# Patient Record
Sex: MALE | Race: WHITE | NOT HISPANIC OR LATINO | Employment: OTHER | URBAN - METROPOLITAN AREA
[De-identification: names, ages, dates, MRNs, and addresses within clinical notes are randomized per-mention and may not be internally consistent; named-entity substitution may affect disease eponyms.]

---

## 2019-12-31 ENCOUNTER — RECORDS - HEALTHEAST (OUTPATIENT)
Dept: LAB | Facility: CLINIC | Age: 74
End: 2019-12-31

## 2020-01-02 LAB
ANION GAP SERPL CALCULATED.3IONS-SCNC: 10 MMOL/L (ref 5–18)
BUN SERPL-MCNC: 27 MG/DL (ref 8–28)
CALCIUM SERPL-MCNC: 9.2 MG/DL (ref 8.5–10.5)
CHLORIDE BLD-SCNC: 103 MMOL/L (ref 98–107)
CO2 SERPL-SCNC: 25 MMOL/L (ref 22–31)
CREAT SERPL-MCNC: 1.5 MG/DL (ref 0.7–1.3)
GFR SERPL CREATININE-BSD FRML MDRD: 46 ML/MIN/1.73M2
GLUCOSE BLD-MCNC: 133 MG/DL (ref 70–125)
POTASSIUM BLD-SCNC: 4.6 MMOL/L (ref 3.5–5)
SODIUM SERPL-SCNC: 138 MMOL/L (ref 136–145)

## 2020-01-03 ENCOUNTER — RECORDS - HEALTHEAST (OUTPATIENT)
Dept: LAB | Facility: CLINIC | Age: 75
End: 2020-01-03

## 2020-01-05 LAB
GAMMA INTERFERON BACKGROUND BLD IA-ACNC: 0.02 IU/ML
M TB IFN-G BLD-IMP: NEGATIVE
MITOGEN IGNF BCKGRD COR BLD-ACNC: 0 IU/ML
MITOGEN IGNF BCKGRD COR BLD-ACNC: 0.01 IU/ML
QTF INTERPRETATION: NORMAL
QTF MITOGEN - NIL: 9.38 IU/ML

## 2020-01-06 LAB
ANION GAP SERPL CALCULATED.3IONS-SCNC: 10 MMOL/L (ref 5–18)
BUN SERPL-MCNC: 15 MG/DL (ref 8–28)
CALCIUM SERPL-MCNC: 9.2 MG/DL (ref 8.5–10.5)
CHLORIDE BLD-SCNC: 109 MMOL/L (ref 98–107)
CO2 SERPL-SCNC: 21 MMOL/L (ref 22–31)
CREAT SERPL-MCNC: 1.1 MG/DL (ref 0.7–1.3)
ERYTHROCYTE [DISTWIDTH] IN BLOOD BY AUTOMATED COUNT: 21.1 % (ref 11–14.5)
FERRITIN SERPL-MCNC: 23 NG/ML (ref 27–300)
FOLATE SERPL-MCNC: 5.7 NG/ML
GFR SERPL CREATININE-BSD FRML MDRD: >60 ML/MIN/1.73M2
GLUCOSE BLD-MCNC: 116 MG/DL (ref 70–125)
HCT VFR BLD AUTO: 38.9 % (ref 40–54)
HGB BLD-MCNC: 11.9 G/DL (ref 14–18)
IRON SATN MFR SERPL: 10 % (ref 20–50)
IRON SERPL-MCNC: 35 UG/DL (ref 42–175)
MCH RBC QN AUTO: 24.1 PG (ref 27–34)
MCHC RBC AUTO-ENTMCNC: 30.6 G/DL (ref 32–36)
MCV RBC AUTO: 79 FL (ref 80–100)
PLATELET # BLD AUTO: 383 THOU/UL (ref 140–440)
PMV BLD AUTO: 11.1 FL (ref 8.5–12.5)
POTASSIUM BLD-SCNC: 4.6 MMOL/L (ref 3.5–5)
RBC # BLD AUTO: 4.94 MILL/UL (ref 4.4–6.2)
SODIUM SERPL-SCNC: 140 MMOL/L (ref 136–145)
TIBC SERPL-MCNC: 340 UG/DL (ref 313–563)
TRANSFERRIN SERPL-MCNC: 272 MG/DL (ref 212–360)
VIT B12 SERPL-MCNC: 489 PG/ML (ref 213–816)
WBC: 16.5 THOU/UL (ref 4–11)

## 2020-01-13 ENCOUNTER — RECORDS - HEALTHEAST (OUTPATIENT)
Dept: LAB | Facility: CLINIC | Age: 75
End: 2020-01-13

## 2020-01-14 LAB
ANION GAP SERPL CALCULATED.3IONS-SCNC: 10 MMOL/L (ref 5–18)
BUN SERPL-MCNC: 28 MG/DL (ref 8–28)
CALCIUM SERPL-MCNC: 9.5 MG/DL (ref 8.5–10.5)
CHLORIDE BLD-SCNC: 106 MMOL/L (ref 98–107)
CO2 SERPL-SCNC: 22 MMOL/L (ref 22–31)
CREAT SERPL-MCNC: 1.25 MG/DL (ref 0.7–1.3)
GFR SERPL CREATININE-BSD FRML MDRD: 56 ML/MIN/1.73M2
GLUCOSE BLD-MCNC: 98 MG/DL (ref 70–125)
POTASSIUM BLD-SCNC: 4.3 MMOL/L (ref 3.5–5)
SODIUM SERPL-SCNC: 138 MMOL/L (ref 136–145)

## 2020-07-27 ENCOUNTER — RECORDS - HEALTHEAST (OUTPATIENT)
Dept: LAB | Facility: CLINIC | Age: 75
End: 2020-07-27

## 2020-07-28 LAB
ANION GAP SERPL CALCULATED.3IONS-SCNC: 12 MMOL/L (ref 5–18)
BUN SERPL-MCNC: 37 MG/DL (ref 8–28)
CALCIUM SERPL-MCNC: 9.2 MG/DL (ref 8.5–10.5)
CHLORIDE BLD-SCNC: 97 MMOL/L (ref 98–107)
CO2 SERPL-SCNC: 26 MMOL/L (ref 22–31)
CREAT SERPL-MCNC: 1.43 MG/DL (ref 0.7–1.3)
ERYTHROCYTE [DISTWIDTH] IN BLOOD BY AUTOMATED COUNT: 30 % (ref 11–14.5)
GFR SERPL CREATININE-BSD FRML MDRD: 48 ML/MIN/1.73M2
GLUCOSE BLD-MCNC: 64 MG/DL (ref 70–125)
HCT VFR BLD AUTO: 32.3 % (ref 40–54)
HGB BLD-MCNC: 9 G/DL (ref 14–18)
MCH RBC QN AUTO: 22.4 PG (ref 27–34)
MCHC RBC AUTO-ENTMCNC: 27.9 G/DL (ref 32–36)
MCV RBC AUTO: 80 FL (ref 80–100)
PLATELET # BLD AUTO: 618 THOU/UL (ref 140–440)
PMV BLD AUTO: 10.8 FL (ref 8.5–12.5)
POTASSIUM BLD-SCNC: 4.4 MMOL/L (ref 3.5–5)
RBC # BLD AUTO: 4.02 MILL/UL (ref 4.4–6.2)
SODIUM SERPL-SCNC: 135 MMOL/L (ref 136–145)
WBC: 11.9 THOU/UL (ref 4–11)

## 2020-07-31 LAB
GAMMA INTERFERON BACKGROUND BLD IA-ACNC: 0.11 IU/ML
M TB IFN-G BLD-IMP: NEGATIVE
MITOGEN IGNF BCKGRD COR BLD-ACNC: -0.01 IU/ML
MITOGEN IGNF BCKGRD COR BLD-ACNC: 0.01 IU/ML
QTF INTERPRETATION: NORMAL
QTF MITOGEN - NIL: 4.9 IU/ML

## 2020-09-14 ENCOUNTER — RECORDS - HEALTHEAST (OUTPATIENT)
Dept: LAB | Facility: CLINIC | Age: 75
End: 2020-09-14

## 2020-09-15 LAB
ANION GAP SERPL CALCULATED.3IONS-SCNC: 10 MMOL/L (ref 5–18)
BUN SERPL-MCNC: 35 MG/DL (ref 8–28)
CALCIUM SERPL-MCNC: 9 MG/DL (ref 8.5–10.5)
CHLORIDE BLD-SCNC: 101 MMOL/L (ref 98–107)
CO2 SERPL-SCNC: 28 MMOL/L (ref 22–31)
CREAT SERPL-MCNC: 1.45 MG/DL (ref 0.7–1.3)
GFR SERPL CREATININE-BSD FRML MDRD: 47 ML/MIN/1.73M2
GLUCOSE BLD-MCNC: 84 MG/DL (ref 70–125)
POTASSIUM BLD-SCNC: 4.3 MMOL/L (ref 3.5–5)
SODIUM SERPL-SCNC: 139 MMOL/L (ref 136–145)

## 2021-02-26 ENCOUNTER — RECORDS - HEALTHEAST (OUTPATIENT)
Dept: LAB | Facility: CLINIC | Age: 76
End: 2021-02-26

## 2021-02-27 LAB
ANION GAP SERPL CALCULATED.3IONS-SCNC: 9 MMOL/L (ref 5–18)
BUN SERPL-MCNC: 38 MG/DL (ref 8–28)
CALCIUM SERPL-MCNC: 8.3 MG/DL (ref 8.5–10.5)
CHLORIDE BLD-SCNC: 105 MMOL/L (ref 98–107)
CO2 SERPL-SCNC: 25 MMOL/L (ref 22–31)
CREAT SERPL-MCNC: 1.72 MG/DL (ref 0.7–1.3)
ERYTHROCYTE [DISTWIDTH] IN BLOOD BY AUTOMATED COUNT: 21.6 % (ref 11–14.5)
GFR SERPL CREATININE-BSD FRML MDRD: 39 ML/MIN/1.73M2
GLUCOSE BLD-MCNC: 212 MG/DL (ref 70–125)
HCT VFR BLD AUTO: 22 % (ref 40–54)
HGB BLD-MCNC: 6.7 G/DL (ref 14–18)
MCH RBC QN AUTO: 25.4 PG (ref 27–34)
MCHC RBC AUTO-ENTMCNC: 30.5 G/DL (ref 32–36)
MCV RBC AUTO: 83 FL (ref 80–100)
PLATELET # BLD AUTO: 262 THOU/UL (ref 140–440)
PMV BLD AUTO: 12.5 FL (ref 8.5–12.5)
POTASSIUM BLD-SCNC: 3.7 MMOL/L (ref 3.5–5)
RBC # BLD AUTO: 2.64 MILL/UL (ref 4.4–6.2)
SODIUM SERPL-SCNC: 139 MMOL/L (ref 136–145)
WBC: 13.6 THOU/UL (ref 4–11)

## 2021-03-05 ENCOUNTER — RECORDS - HEALTHEAST (OUTPATIENT)
Dept: LAB | Facility: CLINIC | Age: 76
End: 2021-03-05

## 2021-03-05 LAB
SARS-COV-2 PCR COMMENT: NORMAL
SARS-COV-2 RNA SPEC QL NAA+PROBE: NEGATIVE
SARS-COV-2 VIRUS SPECIMEN SOURCE: NORMAL

## 2021-03-08 ENCOUNTER — RECORDS - HEALTHEAST (OUTPATIENT)
Dept: LAB | Facility: CLINIC | Age: 76
End: 2021-03-08

## 2021-03-09 LAB
ANION GAP SERPL CALCULATED.3IONS-SCNC: 9 MMOL/L (ref 5–18)
BUN SERPL-MCNC: 27 MG/DL (ref 8–28)
CALCIUM SERPL-MCNC: 8.4 MG/DL (ref 8.5–10.5)
CHLORIDE BLD-SCNC: 111 MMOL/L (ref 98–107)
CO2 SERPL-SCNC: 18 MMOL/L (ref 22–31)
CREAT SERPL-MCNC: 1.27 MG/DL (ref 0.7–1.3)
ERYTHROCYTE [DISTWIDTH] IN BLOOD BY AUTOMATED COUNT: 21.1 % (ref 11–14.5)
GFR SERPL CREATININE-BSD FRML MDRD: 55 ML/MIN/1.73M2
GLUCOSE BLD-MCNC: 115 MG/DL (ref 70–125)
HCT VFR BLD AUTO: 28.3 % (ref 40–54)
HGB BLD-MCNC: 8.3 G/DL (ref 14–18)
MCH RBC QN AUTO: 26.2 PG (ref 27–34)
MCHC RBC AUTO-ENTMCNC: 29.3 G/DL (ref 32–36)
MCV RBC AUTO: 89 FL (ref 80–100)
PLATELET # BLD AUTO: 647 THOU/UL (ref 140–440)
PMV BLD AUTO: 10.8 FL (ref 8.5–12.5)
POTASSIUM BLD-SCNC: 4.3 MMOL/L (ref 3.5–5)
RBC # BLD AUTO: 3.17 MILL/UL (ref 4.4–6.2)
SODIUM SERPL-SCNC: 138 MMOL/L (ref 136–145)
WBC: 13.7 THOU/UL (ref 4–11)

## 2021-03-10 ENCOUNTER — RECORDS - HEALTHEAST (OUTPATIENT)
Dept: LAB | Facility: CLINIC | Age: 76
End: 2021-03-10

## 2021-03-12 LAB
GAMMA INTERFERON BACKGROUND BLD IA-ACNC: 0.04 IU/ML
M TB IFN-G BLD-IMP: NEGATIVE
MITOGEN IGNF BCKGRD COR BLD-ACNC: 0 IU/ML
MITOGEN IGNF BCKGRD COR BLD-ACNC: 0 IU/ML
QTF INTERPRETATION: NORMAL
QTF MITOGEN - NIL: 8.39 IU/ML

## 2021-03-15 ENCOUNTER — RECORDS - HEALTHEAST (OUTPATIENT)
Dept: LAB | Facility: CLINIC | Age: 76
End: 2021-03-15

## 2021-03-16 LAB
ANION GAP SERPL CALCULATED.3IONS-SCNC: 7 MMOL/L (ref 5–18)
BUN SERPL-MCNC: 22 MG/DL (ref 8–28)
CALCIUM SERPL-MCNC: 8.4 MG/DL (ref 8.5–10.5)
CHLORIDE BLD-SCNC: 113 MMOL/L (ref 98–107)
CO2 SERPL-SCNC: 19 MMOL/L (ref 22–31)
CREAT SERPL-MCNC: 1.17 MG/DL (ref 0.7–1.3)
ERYTHROCYTE [DISTWIDTH] IN BLOOD BY AUTOMATED COUNT: 20.7 % (ref 11–14.5)
GFR SERPL CREATININE-BSD FRML MDRD: >60 ML/MIN/1.73M2
GLUCOSE BLD-MCNC: 109 MG/DL (ref 70–125)
HCT VFR BLD AUTO: 28.7 % (ref 40–54)
HGB BLD-MCNC: 8.4 G/DL (ref 14–18)
MCH RBC QN AUTO: 26.1 PG (ref 27–34)
MCHC RBC AUTO-ENTMCNC: 29.3 G/DL (ref 32–36)
MCV RBC AUTO: 89 FL (ref 80–100)
PLATELET # BLD AUTO: 613 THOU/UL (ref 140–440)
PMV BLD AUTO: 11.1 FL (ref 8.5–12.5)
POTASSIUM BLD-SCNC: 4.4 MMOL/L (ref 3.5–5)
RBC # BLD AUTO: 3.22 MILL/UL (ref 4.4–6.2)
SODIUM SERPL-SCNC: 139 MMOL/L (ref 136–145)
WBC: 11.1 THOU/UL (ref 4–11)

## 2021-03-17 ENCOUNTER — RECORDS - HEALTHEAST (OUTPATIENT)
Dept: LAB | Facility: CLINIC | Age: 76
End: 2021-03-17

## 2021-03-22 ENCOUNTER — RECORDS - HEALTHEAST (OUTPATIENT)
Dept: LAB | Facility: CLINIC | Age: 76
End: 2021-03-22

## 2021-03-23 LAB
BASOPHILS # BLD AUTO: 0.1 THOU/UL (ref 0–0.2)
BASOPHILS NFR BLD AUTO: 1 % (ref 0–2)
EOSINOPHIL # BLD AUTO: 0.3 THOU/UL (ref 0–0.4)
EOSINOPHIL NFR BLD AUTO: 3 % (ref 0–6)
ERYTHROCYTE [DISTWIDTH] IN BLOOD BY AUTOMATED COUNT: 19.9 % (ref 11–14.5)
HBA1C MFR BLD: 5.6 %
HCT VFR BLD AUTO: 29.2 % (ref 40–54)
HGB BLD-MCNC: 8.6 G/DL (ref 14–18)
IMM GRANULOCYTES # BLD: 0 THOU/UL
IMM GRANULOCYTES NFR BLD: 0 %
LYMPHOCYTES # BLD AUTO: 1.2 THOU/UL (ref 0.8–4.4)
LYMPHOCYTES NFR BLD AUTO: 12 % (ref 20–40)
MCH RBC QN AUTO: 26.1 PG (ref 27–34)
MCHC RBC AUTO-ENTMCNC: 29.5 G/DL (ref 32–36)
MCV RBC AUTO: 89 FL (ref 80–100)
MONOCYTES # BLD AUTO: 1.6 THOU/UL (ref 0–0.9)
MONOCYTES NFR BLD AUTO: 16 % (ref 2–10)
NEUTROPHILS # BLD AUTO: 6.9 THOU/UL (ref 2–7.7)
NEUTROPHILS NFR BLD AUTO: 68 % (ref 50–70)
PLATELET # BLD AUTO: 541 THOU/UL (ref 140–440)
PMV BLD AUTO: 10.9 FL (ref 8.5–12.5)
RBC # BLD AUTO: 3.29 MILL/UL (ref 4.4–6.2)
WBC: 10.2 THOU/UL (ref 4–11)

## 2021-03-29 ENCOUNTER — RECORDS - HEALTHEAST (OUTPATIENT)
Dept: LAB | Facility: CLINIC | Age: 76
End: 2021-03-29

## 2021-03-30 ENCOUNTER — RECORDS - HEALTHEAST (OUTPATIENT)
Dept: LAB | Facility: CLINIC | Age: 76
End: 2021-03-30

## 2021-03-30 LAB
ANION GAP SERPL CALCULATED.3IONS-SCNC: 10 MMOL/L (ref 5–18)
BNP SERPL-MCNC: 774 PG/ML (ref 0–78)
BUN SERPL-MCNC: 26 MG/DL (ref 8–28)
CALCIUM SERPL-MCNC: 8.7 MG/DL (ref 8.5–10.5)
CHLORIDE BLD-SCNC: 106 MMOL/L (ref 98–107)
CO2 SERPL-SCNC: 21 MMOL/L (ref 22–31)
CREAT SERPL-MCNC: 1.39 MG/DL (ref 0.7–1.3)
ERYTHROCYTE [DISTWIDTH] IN BLOOD BY AUTOMATED COUNT: 18.8 % (ref 11–14.5)
GFR SERPL CREATININE-BSD FRML MDRD: 50 ML/MIN/1.73M2
GLUCOSE BLD-MCNC: 133 MG/DL (ref 70–125)
HCT VFR BLD AUTO: 27.4 % (ref 40–54)
HGB BLD-MCNC: 8.5 G/DL (ref 14–18)
MCH RBC QN AUTO: 26.6 PG (ref 27–34)
MCHC RBC AUTO-ENTMCNC: 31 G/DL (ref 32–36)
MCV RBC AUTO: 86 FL (ref 80–100)
PLATELET # BLD AUTO: 552 THOU/UL (ref 140–440)
PMV BLD AUTO: 10.4 FL (ref 8.5–12.5)
POTASSIUM BLD-SCNC: 4.1 MMOL/L (ref 3.5–5)
RBC # BLD AUTO: 3.19 MILL/UL (ref 4.4–6.2)
SODIUM SERPL-SCNC: 137 MMOL/L (ref 136–145)
WBC: 25.4 THOU/UL (ref 4–11)

## 2021-04-01 ENCOUNTER — RECORDS - HEALTHEAST (OUTPATIENT)
Dept: LAB | Facility: CLINIC | Age: 76
End: 2021-04-01

## 2021-04-01 LAB — BACTERIA SPEC CULT: ABNORMAL

## 2021-04-07 LAB
GAMMA INTERFERON BACKGROUND BLD IA-ACNC: 0.07 IU/ML
M TB IFN-G BLD-IMP: NEGATIVE
MITOGEN IGNF BCKGRD COR BLD-ACNC: -0.05 IU/ML
MITOGEN IGNF BCKGRD COR BLD-ACNC: 0.01 IU/ML
QTF INTERPRETATION: NORMAL
QTF MITOGEN - NIL: 7.53 IU/ML

## 2021-05-26 ENCOUNTER — RECORDS - HEALTHEAST (OUTPATIENT)
Dept: ADMINISTRATIVE | Facility: CLINIC | Age: 76
End: 2021-05-26

## 2021-05-29 ENCOUNTER — RECORDS - HEALTHEAST (OUTPATIENT)
Dept: ADMINISTRATIVE | Facility: CLINIC | Age: 76
End: 2021-05-29

## 2021-05-30 ENCOUNTER — RECORDS - HEALTHEAST (OUTPATIENT)
Dept: ADMINISTRATIVE | Facility: CLINIC | Age: 76
End: 2021-05-30

## 2021-05-31 ENCOUNTER — RECORDS - HEALTHEAST (OUTPATIENT)
Dept: ADMINISTRATIVE | Facility: CLINIC | Age: 76
End: 2021-05-31

## 2022-01-01 ENCOUNTER — APPOINTMENT (OUTPATIENT)
Dept: OCCUPATIONAL THERAPY | Facility: HOSPITAL | Age: 77
DRG: 193 | End: 2022-01-01
Payer: COMMERCIAL

## 2022-01-01 ENCOUNTER — LAB REQUISITION (OUTPATIENT)
Dept: LAB | Facility: CLINIC | Age: 77
End: 2022-01-01
Payer: COMMERCIAL

## 2022-01-01 ENCOUNTER — APPOINTMENT (OUTPATIENT)
Dept: PHYSICAL THERAPY | Facility: HOSPITAL | Age: 77
DRG: 193 | End: 2022-01-01
Payer: COMMERCIAL

## 2022-01-01 ENCOUNTER — APPOINTMENT (OUTPATIENT)
Dept: CARDIOLOGY | Facility: HOSPITAL | Age: 77
DRG: 193 | End: 2022-01-01
Attending: STUDENT IN AN ORGANIZED HEALTH CARE EDUCATION/TRAINING PROGRAM
Payer: COMMERCIAL

## 2022-01-01 ENCOUNTER — TRANSITIONAL CARE UNIT VISIT (OUTPATIENT)
Dept: GERIATRICS | Facility: CLINIC | Age: 77
End: 2022-01-01
Payer: COMMERCIAL

## 2022-01-01 ENCOUNTER — APPOINTMENT (OUTPATIENT)
Dept: RADIOLOGY | Facility: HOSPITAL | Age: 77
DRG: 193 | End: 2022-01-01
Attending: EMERGENCY MEDICINE
Payer: COMMERCIAL

## 2022-01-01 ENCOUNTER — APPOINTMENT (OUTPATIENT)
Dept: OCCUPATIONAL THERAPY | Facility: HOSPITAL | Age: 77
DRG: 193 | End: 2022-01-01
Attending: FAMILY MEDICINE
Payer: COMMERCIAL

## 2022-01-01 ENCOUNTER — LAB REQUISITION (OUTPATIENT)
Dept: LAB | Facility: CLINIC | Age: 77
End: 2022-01-01

## 2022-01-01 ENCOUNTER — APPOINTMENT (OUTPATIENT)
Dept: CT IMAGING | Facility: HOSPITAL | Age: 77
DRG: 193 | End: 2022-01-01
Attending: INTERNAL MEDICINE
Payer: COMMERCIAL

## 2022-01-01 ENCOUNTER — HOSPITAL ENCOUNTER (INPATIENT)
Facility: HOSPITAL | Age: 77
LOS: 11 days | Discharge: NURSING, REHAB OR LONG-TERM CARE | DRG: 193 | End: 2022-01-17
Attending: EMERGENCY MEDICINE | Admitting: STUDENT IN AN ORGANIZED HEALTH CARE EDUCATION/TRAINING PROGRAM
Payer: COMMERCIAL

## 2022-01-01 ENCOUNTER — APPOINTMENT (OUTPATIENT)
Dept: RADIOLOGY | Facility: HOSPITAL | Age: 77
DRG: 193 | End: 2022-01-01
Attending: HOSPITALIST
Payer: COMMERCIAL

## 2022-01-01 ENCOUNTER — APPOINTMENT (OUTPATIENT)
Dept: PHYSICAL THERAPY | Facility: HOSPITAL | Age: 77
DRG: 193 | End: 2022-01-01
Attending: HOSPITALIST
Payer: COMMERCIAL

## 2022-01-01 ENCOUNTER — TELEPHONE (OUTPATIENT)
Dept: GERIATRICS | Facility: CLINIC | Age: 77
End: 2022-01-01

## 2022-01-01 ENCOUNTER — APPOINTMENT (OUTPATIENT)
Dept: PHYSICAL THERAPY | Facility: HOSPITAL | Age: 77
DRG: 193 | End: 2022-01-01
Attending: FAMILY MEDICINE
Payer: COMMERCIAL

## 2022-01-01 ENCOUNTER — APPOINTMENT (OUTPATIENT)
Dept: ULTRASOUND IMAGING | Facility: HOSPITAL | Age: 77
DRG: 193 | End: 2022-01-01
Attending: EMERGENCY MEDICINE
Payer: COMMERCIAL

## 2022-01-01 ENCOUNTER — PATIENT OUTREACH (OUTPATIENT)
Dept: CARE COORDINATION | Facility: CLINIC | Age: 77
End: 2022-01-01

## 2022-01-01 VITALS
HEIGHT: 67 IN | OXYGEN SATURATION: 92 % | HEART RATE: 68 BPM | SYSTOLIC BLOOD PRESSURE: 100 MMHG | WEIGHT: 179.8 LBS | BODY MASS INDEX: 28.22 KG/M2 | TEMPERATURE: 98.1 F | RESPIRATION RATE: 18 BRPM | DIASTOLIC BLOOD PRESSURE: 55 MMHG

## 2022-01-01 VITALS
WEIGHT: 163.3 LBS | DIASTOLIC BLOOD PRESSURE: 61 MMHG | HEIGHT: 67 IN | OXYGEN SATURATION: 94 % | BODY MASS INDEX: 25.63 KG/M2 | SYSTOLIC BLOOD PRESSURE: 117 MMHG | RESPIRATION RATE: 18 BRPM | HEART RATE: 69 BPM | TEMPERATURE: 98.6 F

## 2022-01-01 VITALS
HEART RATE: 65 BPM | TEMPERATURE: 98.7 F | WEIGHT: 178.4 LBS | OXYGEN SATURATION: 94 % | SYSTOLIC BLOOD PRESSURE: 128 MMHG | RESPIRATION RATE: 18 BRPM | HEIGHT: 67 IN | DIASTOLIC BLOOD PRESSURE: 65 MMHG | BODY MASS INDEX: 28 KG/M2

## 2022-01-01 VITALS
RESPIRATION RATE: 20 BRPM | OXYGEN SATURATION: 94 % | TEMPERATURE: 98.7 F | SYSTOLIC BLOOD PRESSURE: 131 MMHG | DIASTOLIC BLOOD PRESSURE: 46 MMHG | HEART RATE: 62 BPM | BODY MASS INDEX: 27.59 KG/M2 | HEIGHT: 67 IN | WEIGHT: 175.8 LBS

## 2022-01-01 DIAGNOSIS — I50.812 CHRONIC RIGHT-SIDED CONGESTIVE HEART FAILURE (H): ICD-10-CM

## 2022-01-01 DIAGNOSIS — E11.9 TYPE 2 DIABETES MELLITUS WITHOUT COMPLICATIONS (H): ICD-10-CM

## 2022-01-01 DIAGNOSIS — D63.8 ANEMIA IN OTHER CHRONIC DISEASES CLASSIFIED ELSEWHERE: ICD-10-CM

## 2022-01-01 DIAGNOSIS — N18.9 CHRONIC KIDNEY DISEASE, UNSPECIFIED: ICD-10-CM

## 2022-01-01 DIAGNOSIS — E11.22 TYPE 2 DIABETES MELLITUS WITH DIABETIC CHRONIC KIDNEY DISEASE (H): ICD-10-CM

## 2022-01-01 DIAGNOSIS — J44.1 COPD EXACERBATION (H): ICD-10-CM

## 2022-01-01 DIAGNOSIS — J10.1 INFLUENZA A: Primary | ICD-10-CM

## 2022-01-01 DIAGNOSIS — J10.1 INFLUENZA A: ICD-10-CM

## 2022-01-01 DIAGNOSIS — J18.9 PNEUMONIA OF RIGHT LOWER LOBE DUE TO INFECTIOUS ORGANISM: ICD-10-CM

## 2022-01-01 DIAGNOSIS — R09.02 HYPOXIA: ICD-10-CM

## 2022-01-01 DIAGNOSIS — Z11.59 ENCOUNTER FOR SCREENING FOR OTHER VIRAL DISEASES: ICD-10-CM

## 2022-01-01 DIAGNOSIS — D64.9 ANEMIA, UNSPECIFIED: ICD-10-CM

## 2022-01-01 DIAGNOSIS — R53.81 PHYSICAL DECONDITIONING: ICD-10-CM

## 2022-01-01 DIAGNOSIS — J96.01 ACUTE RESPIRATORY FAILURE WITH HYPOXIA (H): ICD-10-CM

## 2022-01-01 DIAGNOSIS — I50.9 HEART FAILURE, UNSPECIFIED (H): ICD-10-CM

## 2022-01-01 DIAGNOSIS — I50.9 CONGESTIVE HEART FAILURE, UNSPECIFIED HF CHRONICITY, UNSPECIFIED HEART FAILURE TYPE (H): ICD-10-CM

## 2022-01-01 DIAGNOSIS — Z71.89 OTHER SPECIFIED COUNSELING: ICD-10-CM

## 2022-01-01 DIAGNOSIS — I27.22 PULMONARY HYPERTENSION DUE TO LEFT HEART DISEASE (H): ICD-10-CM

## 2022-01-01 DIAGNOSIS — S30.1XXA HEMATOMA OF RECTUS SHEATH, INITIAL ENCOUNTER: Primary | ICD-10-CM

## 2022-01-01 DIAGNOSIS — J96.01 ACUTE RESPIRATORY FAILURE WITH HYPOXIA (H): Primary | ICD-10-CM

## 2022-01-01 DIAGNOSIS — T14.8XXA HEMATOMA: ICD-10-CM

## 2022-01-01 DIAGNOSIS — R79.9 ABNORMAL FINDING OF BLOOD CHEMISTRY, UNSPECIFIED: ICD-10-CM

## 2022-01-01 DIAGNOSIS — K59.00 CONSTIPATION, UNSPECIFIED CONSTIPATION TYPE: ICD-10-CM

## 2022-01-01 DIAGNOSIS — I50.33 ACUTE ON CHRONIC DIASTOLIC (CONGESTIVE) HEART FAILURE (H): ICD-10-CM

## 2022-01-01 LAB
ABO/RH(D): NORMAL
ACANTHOCYTES BLD QL SMEAR: SLIGHT
ALBUMIN SERPL-MCNC: 2.5 G/DL (ref 3.5–5)
ALBUMIN SERPL-MCNC: 2.5 G/DL (ref 3.5–5)
ALP SERPL-CCNC: 160 U/L (ref 45–120)
ALP SERPL-CCNC: 162 U/L (ref 45–120)
ALT SERPL W P-5'-P-CCNC: <9 U/L (ref 0–45)
ALT SERPL W P-5'-P-CCNC: <9 U/L (ref 0–45)
ANION GAP SERPL CALCULATED.3IONS-SCNC: 10 MMOL/L (ref 5–18)
ANION GAP SERPL CALCULATED.3IONS-SCNC: 13 MMOL/L (ref 5–18)
ANION GAP SERPL CALCULATED.3IONS-SCNC: 13 MMOL/L (ref 5–18)
ANION GAP SERPL CALCULATED.3IONS-SCNC: 6 MMOL/L (ref 5–18)
ANION GAP SERPL CALCULATED.3IONS-SCNC: 7 MMOL/L (ref 5–18)
ANION GAP SERPL CALCULATED.3IONS-SCNC: 8 MMOL/L (ref 5–18)
ANION GAP SERPL CALCULATED.3IONS-SCNC: 9 MMOL/L (ref 5–18)
ANTIBODY SCREEN: NEGATIVE
AST SERPL W P-5'-P-CCNC: 16 U/L (ref 0–40)
AST SERPL W P-5'-P-CCNC: 21 U/L (ref 0–40)
ATRIAL RATE - MUSE: 81 BPM
BASOPHILS # BLD AUTO: 0.1 10E3/UL (ref 0–0.2)
BASOPHILS # BLD MANUAL: 0.1 10E3/UL (ref 0–0.2)
BASOPHILS NFR BLD AUTO: 0 %
BASOPHILS NFR BLD MANUAL: 1 %
BILIRUB SERPL-MCNC: 1.1 MG/DL (ref 0–1)
BILIRUB SERPL-MCNC: 1.7 MG/DL (ref 0–1)
BLD PROD TYP BPU: NORMAL
BLOOD COMPONENT TYPE: NORMAL
BNP SERPL-MCNC: 1071 PG/ML (ref 0–80)
BNP SERPL-MCNC: 1871 PG/ML (ref 0–80)
BUN SERPL-MCNC: 29 MG/DL (ref 8–28)
BUN SERPL-MCNC: 35 MG/DL (ref 8–28)
BUN SERPL-MCNC: 36 MG/DL (ref 8–28)
BUN SERPL-MCNC: 36 MG/DL (ref 8–28)
BUN SERPL-MCNC: 38 MG/DL (ref 8–28)
BUN SERPL-MCNC: 39 MG/DL (ref 8–28)
BUN SERPL-MCNC: 39 MG/DL (ref 8–28)
BUN SERPL-MCNC: 45 MG/DL (ref 8–28)
BUN SERPL-MCNC: 45 MG/DL (ref 8–28)
BUN SERPL-MCNC: 47 MG/DL (ref 8–28)
BUN SERPL-MCNC: 48 MG/DL (ref 8–28)
BUN SERPL-MCNC: 50 MG/DL (ref 8–28)
BUN SERPL-MCNC: 55 MG/DL (ref 8–28)
CALCIUM SERPL-MCNC: 7.6 MG/DL (ref 8.5–10.5)
CALCIUM SERPL-MCNC: 7.7 MG/DL (ref 8.5–10.5)
CALCIUM SERPL-MCNC: 7.8 MG/DL (ref 8.5–10.5)
CALCIUM SERPL-MCNC: 7.8 MG/DL (ref 8.5–10.5)
CALCIUM SERPL-MCNC: 7.9 MG/DL (ref 8.5–10.5)
CALCIUM SERPL-MCNC: 7.9 MG/DL (ref 8.5–10.5)
CALCIUM SERPL-MCNC: 8 MG/DL (ref 8.5–10.5)
CALCIUM SERPL-MCNC: 8.1 MG/DL (ref 8.5–10.5)
CALCIUM SERPL-MCNC: 8.4 MG/DL (ref 8.5–10.5)
CALCIUM SERPL-MCNC: 8.5 MG/DL (ref 8.5–10.5)
CALCIUM SERPL-MCNC: 8.5 MG/DL (ref 8.5–10.5)
CALCIUM SERPL-MCNC: 8.6 MG/DL (ref 8.5–10.5)
CALCIUM SERPL-MCNC: 8.9 MG/DL (ref 8.5–10.5)
CHLORIDE BLD-SCNC: 101 MMOL/L (ref 98–107)
CHLORIDE BLD-SCNC: 103 MMOL/L (ref 98–107)
CHLORIDE BLD-SCNC: 104 MMOL/L (ref 98–107)
CHLORIDE BLD-SCNC: 105 MMOL/L (ref 98–107)
CHLORIDE BLD-SCNC: 105 MMOL/L (ref 98–107)
CHLORIDE BLD-SCNC: 106 MMOL/L (ref 98–107)
CHLORIDE BLD-SCNC: 96 MMOL/L (ref 98–107)
CO2 SERPL-SCNC: 23 MMOL/L (ref 22–31)
CO2 SERPL-SCNC: 24 MMOL/L (ref 22–31)
CO2 SERPL-SCNC: 25 MMOL/L (ref 22–31)
CO2 SERPL-SCNC: 25 MMOL/L (ref 22–31)
CO2 SERPL-SCNC: 27 MMOL/L (ref 22–31)
CO2 SERPL-SCNC: 27 MMOL/L (ref 22–31)
CO2 SERPL-SCNC: 28 MMOL/L (ref 22–31)
CO2 SERPL-SCNC: 29 MMOL/L (ref 22–31)
CO2 SERPL-SCNC: 29 MMOL/L (ref 22–31)
CO2 SERPL-SCNC: 30 MMOL/L (ref 22–31)
CO2 SERPL-SCNC: 31 MMOL/L (ref 22–31)
CO2 SERPL-SCNC: 33 MMOL/L (ref 22–31)
CODING SYSTEM: NORMAL
CREAT SERPL-MCNC: 1.52 MG/DL (ref 0.7–1.3)
CREAT SERPL-MCNC: 1.67 MG/DL (ref 0.7–1.3)
CREAT SERPL-MCNC: 1.71 MG/DL (ref 0.7–1.3)
CREAT SERPL-MCNC: 1.74 MG/DL (ref 0.7–1.3)
CREAT SERPL-MCNC: 1.75 MG/DL (ref 0.7–1.3)
CREAT SERPL-MCNC: 1.92 MG/DL (ref 0.7–1.3)
CREAT SERPL-MCNC: 1.94 MG/DL (ref 0.7–1.3)
CREAT SERPL-MCNC: 1.95 MG/DL (ref 0.7–1.3)
CREAT SERPL-MCNC: 2 MG/DL (ref 0.7–1.3)
CREAT SERPL-MCNC: 2.05 MG/DL (ref 0.7–1.3)
CREAT SERPL-MCNC: 2.09 MG/DL (ref 0.7–1.3)
CREAT SERPL-MCNC: 2.15 MG/DL (ref 0.7–1.3)
CREAT SERPL-MCNC: 2.19 MG/DL (ref 0.7–1.3)
CREAT SERPL-MCNC: 2.2 MG/DL (ref 0.7–1.3)
CREAT SERPL-MCNC: 2.46 MG/DL (ref 0.7–1.3)
CROSSMATCH: NORMAL
DIASTOLIC BLOOD PRESSURE - MUSE: NORMAL MMHG
ELLIPTOCYTES BLD QL SMEAR: SLIGHT
EOSINOPHIL # BLD AUTO: 0.1 10E3/UL (ref 0–0.7)
EOSINOPHIL # BLD MANUAL: 0.4 10E3/UL (ref 0–0.7)
EOSINOPHIL NFR BLD AUTO: 1 %
EOSINOPHIL NFR BLD MANUAL: 3 %
ERYTHROCYTE [DISTWIDTH] IN BLOOD BY AUTOMATED COUNT: 16.1 % (ref 10–15)
ERYTHROCYTE [DISTWIDTH] IN BLOOD BY AUTOMATED COUNT: 16.6 % (ref 10–15)
ERYTHROCYTE [DISTWIDTH] IN BLOOD BY AUTOMATED COUNT: 16.8 % (ref 10–15)
ERYTHROCYTE [DISTWIDTH] IN BLOOD BY AUTOMATED COUNT: 17.5 % (ref 10–15)
ERYTHROCYTE [DISTWIDTH] IN BLOOD BY AUTOMATED COUNT: 17.6 % (ref 10–15)
ERYTHROCYTE [DISTWIDTH] IN BLOOD BY AUTOMATED COUNT: 18.2 % (ref 10–15)
ERYTHROCYTE [DISTWIDTH] IN BLOOD BY AUTOMATED COUNT: 18.3 % (ref 10–15)
ERYTHROCYTE [DISTWIDTH] IN BLOOD BY AUTOMATED COUNT: 18.8 % (ref 10–15)
ERYTHROCYTE [DISTWIDTH] IN BLOOD BY AUTOMATED COUNT: 18.9 % (ref 10–15)
ERYTHROCYTE [DISTWIDTH] IN BLOOD BY AUTOMATED COUNT: 18.9 % (ref 10–15)
ERYTHROCYTE [DISTWIDTH] IN BLOOD BY AUTOMATED COUNT: 19 % (ref 10–15)
ERYTHROCYTE [DISTWIDTH] IN BLOOD BY AUTOMATED COUNT: 19 % (ref 10–15)
ERYTHROCYTE [DISTWIDTH] IN BLOOD BY AUTOMATED COUNT: 19.9 % (ref 10–15)
ERYTHROCYTE [DISTWIDTH] IN BLOOD BY AUTOMATED COUNT: 21.2 % (ref 10–15)
FLUAV RNA SPEC QL NAA+PROBE: POSITIVE
FLUBV RNA RESP QL NAA+PROBE: NEGATIVE
FRAGMENTS BLD QL SMEAR: SLIGHT
GFR SERPL CREATININE-BSD FRML MDRD: 26 ML/MIN/1.73M2
GFR SERPL CREATININE-BSD FRML MDRD: 30 ML/MIN/1.73M2
GFR SERPL CREATININE-BSD FRML MDRD: 30 ML/MIN/1.73M2
GFR SERPL CREATININE-BSD FRML MDRD: 31 ML/MIN/1.73M2
GFR SERPL CREATININE-BSD FRML MDRD: 32 ML/MIN/1.73M2
GFR SERPL CREATININE-BSD FRML MDRD: 33 ML/MIN/1.73M2
GFR SERPL CREATININE-BSD FRML MDRD: 34 ML/MIN/1.73M2
GFR SERPL CREATININE-BSD FRML MDRD: 35 ML/MIN/1.73M2
GFR SERPL CREATININE-BSD FRML MDRD: 40 ML/MIN/1.73M2
GFR SERPL CREATININE-BSD FRML MDRD: 40 ML/MIN/1.73M2
GFR SERPL CREATININE-BSD FRML MDRD: 41 ML/MIN/1.73M2
GFR SERPL CREATININE-BSD FRML MDRD: 42 ML/MIN/1.73M2
GFR SERPL CREATININE-BSD FRML MDRD: 47 ML/MIN/1.73M2
GLUCOSE BLD-MCNC: 115 MG/DL (ref 70–125)
GLUCOSE BLD-MCNC: 117 MG/DL (ref 70–125)
GLUCOSE BLD-MCNC: 123 MG/DL (ref 70–125)
GLUCOSE BLD-MCNC: 125 MG/DL (ref 70–125)
GLUCOSE BLD-MCNC: 127 MG/DL (ref 70–125)
GLUCOSE BLD-MCNC: 129 MG/DL (ref 70–125)
GLUCOSE BLD-MCNC: 129 MG/DL (ref 70–125)
GLUCOSE BLD-MCNC: 130 MG/DL (ref 70–125)
GLUCOSE BLD-MCNC: 132 MG/DL (ref 70–125)
GLUCOSE BLD-MCNC: 139 MG/DL (ref 70–125)
GLUCOSE BLD-MCNC: 141 MG/DL (ref 70–125)
GLUCOSE BLD-MCNC: 148 MG/DL (ref 70–125)
GLUCOSE BLD-MCNC: 152 MG/DL (ref 70–125)
GLUCOSE BLD-MCNC: 156 MG/DL (ref 70–125)
GLUCOSE BLD-MCNC: 172 MG/DL (ref 70–125)
GLUCOSE BLDC GLUCOMTR-MCNC: 100 MG/DL (ref 70–99)
GLUCOSE BLDC GLUCOMTR-MCNC: 106 MG/DL (ref 70–99)
GLUCOSE BLDC GLUCOMTR-MCNC: 111 MG/DL (ref 70–99)
GLUCOSE BLDC GLUCOMTR-MCNC: 111 MG/DL (ref 70–99)
GLUCOSE BLDC GLUCOMTR-MCNC: 113 MG/DL (ref 70–99)
GLUCOSE BLDC GLUCOMTR-MCNC: 114 MG/DL (ref 70–99)
GLUCOSE BLDC GLUCOMTR-MCNC: 116 MG/DL (ref 70–99)
GLUCOSE BLDC GLUCOMTR-MCNC: 116 MG/DL (ref 70–99)
GLUCOSE BLDC GLUCOMTR-MCNC: 118 MG/DL (ref 70–99)
GLUCOSE BLDC GLUCOMTR-MCNC: 123 MG/DL (ref 70–99)
GLUCOSE BLDC GLUCOMTR-MCNC: 124 MG/DL (ref 70–99)
GLUCOSE BLDC GLUCOMTR-MCNC: 124 MG/DL (ref 70–99)
GLUCOSE BLDC GLUCOMTR-MCNC: 131 MG/DL (ref 70–99)
GLUCOSE BLDC GLUCOMTR-MCNC: 133 MG/DL (ref 70–99)
GLUCOSE BLDC GLUCOMTR-MCNC: 134 MG/DL (ref 70–99)
GLUCOSE BLDC GLUCOMTR-MCNC: 138 MG/DL (ref 70–99)
GLUCOSE BLDC GLUCOMTR-MCNC: 139 MG/DL (ref 70–99)
GLUCOSE BLDC GLUCOMTR-MCNC: 147 MG/DL (ref 70–99)
GLUCOSE BLDC GLUCOMTR-MCNC: 152 MG/DL (ref 70–99)
GLUCOSE BLDC GLUCOMTR-MCNC: 158 MG/DL (ref 70–99)
GLUCOSE BLDC GLUCOMTR-MCNC: 165 MG/DL (ref 70–99)
GLUCOSE BLDC GLUCOMTR-MCNC: 166 MG/DL (ref 70–99)
GLUCOSE BLDC GLUCOMTR-MCNC: 166 MG/DL (ref 70–99)
GLUCOSE BLDC GLUCOMTR-MCNC: 167 MG/DL (ref 70–99)
GLUCOSE BLDC GLUCOMTR-MCNC: 170 MG/DL (ref 70–99)
GLUCOSE BLDC GLUCOMTR-MCNC: 170 MG/DL (ref 70–99)
GLUCOSE BLDC GLUCOMTR-MCNC: 171 MG/DL (ref 70–99)
GLUCOSE BLDC GLUCOMTR-MCNC: 171 MG/DL (ref 70–99)
GLUCOSE BLDC GLUCOMTR-MCNC: 182 MG/DL (ref 70–99)
GLUCOSE BLDC GLUCOMTR-MCNC: 182 MG/DL (ref 70–99)
GLUCOSE BLDC GLUCOMTR-MCNC: 188 MG/DL (ref 70–99)
GLUCOSE BLDC GLUCOMTR-MCNC: 190 MG/DL (ref 70–99)
GLUCOSE BLDC GLUCOMTR-MCNC: 195 MG/DL (ref 70–99)
GLUCOSE BLDC GLUCOMTR-MCNC: 197 MG/DL (ref 70–99)
GLUCOSE BLDC GLUCOMTR-MCNC: 204 MG/DL (ref 70–99)
GLUCOSE BLDC GLUCOMTR-MCNC: 209 MG/DL (ref 70–99)
GLUCOSE BLDC GLUCOMTR-MCNC: 210 MG/DL (ref 70–99)
GLUCOSE BLDC GLUCOMTR-MCNC: 212 MG/DL (ref 70–99)
GLUCOSE BLDC GLUCOMTR-MCNC: 226 MG/DL (ref 70–99)
GLUCOSE BLDC GLUCOMTR-MCNC: 230 MG/DL (ref 70–99)
GLUCOSE BLDC GLUCOMTR-MCNC: 244 MG/DL (ref 70–99)
GLUCOSE BLDC GLUCOMTR-MCNC: 246 MG/DL (ref 70–99)
GLUCOSE BLDC GLUCOMTR-MCNC: 250 MG/DL (ref 70–99)
GLUCOSE BLDC GLUCOMTR-MCNC: 251 MG/DL (ref 70–99)
GLUCOSE BLDC GLUCOMTR-MCNC: 253 MG/DL (ref 70–99)
GLUCOSE BLDC GLUCOMTR-MCNC: 92 MG/DL (ref 70–99)
HBA1C MFR BLD: 5.8 %
HBA1C MFR BLD: 6.3 %
HCT VFR BLD AUTO: 18.4 % (ref 40–53)
HCT VFR BLD AUTO: 21.8 % (ref 40–53)
HCT VFR BLD AUTO: 23.5 % (ref 40–53)
HCT VFR BLD AUTO: 24.2 % (ref 40–53)
HCT VFR BLD AUTO: 25.5 % (ref 40–53)
HCT VFR BLD AUTO: 26.1 % (ref 40–53)
HCT VFR BLD AUTO: 27.2 % (ref 40–53)
HCT VFR BLD AUTO: 28.8 % (ref 40–53)
HCT VFR BLD AUTO: 30.6 % (ref 40–53)
HCT VFR BLD AUTO: 31.1 % (ref 40–53)
HCT VFR BLD AUTO: 31.4 % (ref 40–53)
HCT VFR BLD AUTO: 31.5 % (ref 40–53)
HCT VFR BLD AUTO: 31.6 % (ref 40–53)
HCT VFR BLD AUTO: 32.5 % (ref 40–53)
HEMOCCULT STL QL: NEGATIVE
HGB BLD-MCNC: 10.2 G/DL (ref 13.3–17.7)
HGB BLD-MCNC: 5.6 G/DL (ref 13.3–17.7)
HGB BLD-MCNC: 5.7 G/DL (ref 13.3–17.7)
HGB BLD-MCNC: 6.4 G/DL (ref 13.3–17.7)
HGB BLD-MCNC: 6.8 G/DL (ref 13.3–17.7)
HGB BLD-MCNC: 7.4 G/DL (ref 13.3–17.7)
HGB BLD-MCNC: 7.5 G/DL (ref 13.3–17.7)
HGB BLD-MCNC: 7.7 G/DL (ref 13.3–17.7)
HGB BLD-MCNC: 7.8 G/DL (ref 13.3–17.7)
HGB BLD-MCNC: 7.8 G/DL (ref 13.3–17.7)
HGB BLD-MCNC: 8.8 G/DL (ref 13.3–17.7)
HGB BLD-MCNC: 9.4 G/DL (ref 13.3–17.7)
HGB BLD-MCNC: 9.5 G/DL (ref 13.3–17.7)
HGB BLD-MCNC: 9.6 G/DL (ref 13.3–17.7)
HGB BLD-MCNC: 9.7 G/DL (ref 13.3–17.7)
HGB BLD-MCNC: 9.8 G/DL (ref 13.3–17.7)
HOLD SPECIMEN: NORMAL
IMM GRANULOCYTES # BLD: 0.1 10E3/UL
IMM GRANULOCYTES NFR BLD: 1 %
INTERPRETATION ECG - MUSE: NORMAL
ISSUE DATE AND TIME: NORMAL
LACTATE SERPL-SCNC: 0.9 MMOL/L (ref 0.7–2)
LACTATE SERPL-SCNC: 1 MMOL/L (ref 0.7–2)
LACTATE SERPL-SCNC: 1.5 MMOL/L (ref 0.7–2)
LACTATE SERPL-SCNC: 1.8 MMOL/L (ref 0.7–2)
LVEF ECHO: NORMAL
LYMPHOCYTES # BLD AUTO: 2.5 10E3/UL (ref 0.8–5.3)
LYMPHOCYTES # BLD MANUAL: 1.8 10E3/UL (ref 0.8–5.3)
LYMPHOCYTES NFR BLD AUTO: 19 %
LYMPHOCYTES NFR BLD MANUAL: 13 %
MCH RBC QN AUTO: 27.1 PG (ref 26.5–33)
MCH RBC QN AUTO: 27.8 PG (ref 26.5–33)
MCH RBC QN AUTO: 27.9 PG (ref 26.5–33)
MCH RBC QN AUTO: 28 PG (ref 26.5–33)
MCH RBC QN AUTO: 28.2 PG (ref 26.5–33)
MCH RBC QN AUTO: 29.2 PG (ref 26.5–33)
MCH RBC QN AUTO: 29.4 PG (ref 26.5–33)
MCH RBC QN AUTO: 29.4 PG (ref 26.5–33)
MCH RBC QN AUTO: 29.5 PG (ref 26.5–33)
MCH RBC QN AUTO: 29.5 PG (ref 26.5–33)
MCH RBC QN AUTO: 29.7 PG (ref 26.5–33)
MCH RBC QN AUTO: 30.1 PG (ref 26.5–33)
MCH RBC QN AUTO: 30.2 PG (ref 26.5–33)
MCH RBC QN AUTO: 30.2 PG (ref 26.5–33)
MCHC RBC AUTO-ENTMCNC: 28.3 G/DL (ref 31.5–36.5)
MCHC RBC AUTO-ENTMCNC: 29.9 G/DL (ref 31.5–36.5)
MCHC RBC AUTO-ENTMCNC: 30.2 G/DL (ref 31.5–36.5)
MCHC RBC AUTO-ENTMCNC: 30.6 G/DL (ref 31.5–36.5)
MCHC RBC AUTO-ENTMCNC: 30.6 G/DL (ref 31.5–36.5)
MCHC RBC AUTO-ENTMCNC: 30.7 G/DL (ref 31.5–36.5)
MCHC RBC AUTO-ENTMCNC: 30.7 G/DL (ref 31.5–36.5)
MCHC RBC AUTO-ENTMCNC: 30.9 G/DL (ref 31.5–36.5)
MCHC RBC AUTO-ENTMCNC: 31 G/DL (ref 31.5–36.5)
MCHC RBC AUTO-ENTMCNC: 31 G/DL (ref 31.5–36.5)
MCHC RBC AUTO-ENTMCNC: 31.2 G/DL (ref 31.5–36.5)
MCHC RBC AUTO-ENTMCNC: 31.2 G/DL (ref 31.5–36.5)
MCHC RBC AUTO-ENTMCNC: 31.4 G/DL (ref 31.5–36.5)
MCHC RBC AUTO-ENTMCNC: 31.5 G/DL (ref 31.5–36.5)
MCV RBC AUTO: 104 FL (ref 78–100)
MCV RBC AUTO: 89 FL (ref 78–100)
MCV RBC AUTO: 90 FL (ref 78–100)
MCV RBC AUTO: 90 FL (ref 78–100)
MCV RBC AUTO: 91 FL (ref 78–100)
MCV RBC AUTO: 92 FL (ref 78–100)
MCV RBC AUTO: 94 FL (ref 78–100)
MCV RBC AUTO: 94 FL (ref 78–100)
MCV RBC AUTO: 95 FL (ref 78–100)
MCV RBC AUTO: 96 FL (ref 78–100)
MCV RBC AUTO: 96 FL (ref 78–100)
MCV RBC AUTO: 99 FL (ref 78–100)
MONOCYTES # BLD AUTO: 1.5 10E3/UL (ref 0–1.3)
MONOCYTES # BLD MANUAL: 1.1 10E3/UL (ref 0–1.3)
MONOCYTES NFR BLD AUTO: 11 %
MONOCYTES NFR BLD MANUAL: 8 %
NEUTROPHILS # BLD AUTO: 9.3 10E3/UL (ref 1.6–8.3)
NEUTROPHILS # BLD MANUAL: 10.6 10E3/UL (ref 1.6–8.3)
NEUTROPHILS NFR BLD AUTO: 68 %
NEUTROPHILS NFR BLD MANUAL: 75 %
NRBC # BLD AUTO: 0 10E3/UL
NRBC BLD AUTO-RTO: 0 /100
P AXIS - MUSE: NORMAL DEGREES
PLAT MORPH BLD: ABNORMAL
PLATELET # BLD AUTO: 223 10E3/UL (ref 150–450)
PLATELET # BLD AUTO: 227 10E3/UL (ref 150–450)
PLATELET # BLD AUTO: 277 10E3/UL (ref 150–450)
PLATELET # BLD AUTO: 309 10E3/UL (ref 150–450)
PLATELET # BLD AUTO: 343 10E3/UL (ref 150–450)
PLATELET # BLD AUTO: 348 10E3/UL (ref 150–450)
PLATELET # BLD AUTO: 352 10E3/UL (ref 150–450)
PLATELET # BLD AUTO: 361 10E3/UL (ref 150–450)
PLATELET # BLD AUTO: 368 10E3/UL (ref 150–450)
PLATELET # BLD AUTO: 377 10E3/UL (ref 150–450)
PLATELET # BLD AUTO: 400 10E3/UL (ref 150–450)
PLATELET # BLD AUTO: 412 10E3/UL (ref 150–450)
PLATELET # BLD AUTO: 451 10E3/UL (ref 150–450)
PLATELET # BLD AUTO: 490 10E3/UL (ref 150–450)
POLYCHROMASIA BLD QL SMEAR: SLIGHT
POTASSIUM BLD-SCNC: 3.5 MMOL/L (ref 3.5–5)
POTASSIUM BLD-SCNC: 3.6 MMOL/L (ref 3.5–5)
POTASSIUM BLD-SCNC: 3.6 MMOL/L (ref 3.5–5)
POTASSIUM BLD-SCNC: 3.9 MMOL/L (ref 3.5–5)
POTASSIUM BLD-SCNC: 4.1 MMOL/L (ref 3.5–5)
POTASSIUM BLD-SCNC: 4.1 MMOL/L (ref 3.5–5)
POTASSIUM BLD-SCNC: 4.2 MMOL/L (ref 3.5–5)
POTASSIUM BLD-SCNC: 4.2 MMOL/L (ref 3.5–5)
POTASSIUM BLD-SCNC: 4.3 MMOL/L (ref 3.5–5)
POTASSIUM BLD-SCNC: 4.5 MMOL/L (ref 3.5–5)
POTASSIUM BLD-SCNC: 4.5 MMOL/L (ref 3.5–5)
POTASSIUM BLD-SCNC: 5 MMOL/L (ref 3.5–5)
PR INTERVAL - MUSE: 272 MS
PROCALCITONIN SERPL-MCNC: 0.07 NG/ML (ref 0–0.49)
PROCALCITONIN SERPL-MCNC: 0.18 NG/ML (ref 0–0.49)
PROT SERPL-MCNC: 5.3 G/DL (ref 6–8)
PROT SERPL-MCNC: 5.3 G/DL (ref 6–8)
QRS DURATION - MUSE: 152 MS
QT - MUSE: 424 MS
QTC - MUSE: 495 MS
R AXIS - MUSE: -63 DEGREES
RBC # BLD AUTO: 2.04 10E6/UL (ref 4.4–5.9)
RBC # BLD AUTO: 2.33 10E6/UL (ref 4.4–5.9)
RBC # BLD AUTO: 2.51 10E6/UL (ref 4.4–5.9)
RBC # BLD AUTO: 2.54 10E6/UL (ref 4.4–5.9)
RBC # BLD AUTO: 2.59 10E6/UL (ref 4.4–5.9)
RBC # BLD AUTO: 2.62 10E6/UL (ref 4.4–5.9)
RBC # BLD AUTO: 2.65 10E6/UL (ref 4.4–5.9)
RBC # BLD AUTO: 2.91 10E6/UL (ref 4.4–5.9)
RBC # BLD AUTO: 3.23 10E6/UL (ref 4.4–5.9)
RBC # BLD AUTO: 3.33 10E6/UL (ref 4.4–5.9)
RBC # BLD AUTO: 3.38 10E6/UL (ref 4.4–5.9)
RBC # BLD AUTO: 3.47 10E6/UL (ref 4.4–5.9)
RBC # BLD AUTO: 3.5 10E6/UL (ref 4.4–5.9)
RBC # BLD AUTO: 3.53 10E6/UL (ref 4.4–5.9)
RBC MORPH BLD: ABNORMAL
SARS-COV-2 RNA RESP QL NAA+PROBE: NEGATIVE
SARS-COV-2 RNA RESP QL NAA+PROBE: NORMAL
SARS-COV-2 RNA RESP QL NAA+PROBE: NOT DETECTED
SARS-COV-2 RNA RESP QL NAA+PROBE: NOT DETECTED
SODIUM SERPL-SCNC: 138 MMOL/L (ref 136–145)
SODIUM SERPL-SCNC: 139 MMOL/L (ref 136–145)
SODIUM SERPL-SCNC: 140 MMOL/L (ref 136–145)
SODIUM SERPL-SCNC: 141 MMOL/L (ref 136–145)
SODIUM SERPL-SCNC: 142 MMOL/L (ref 136–145)
SODIUM SERPL-SCNC: 142 MMOL/L (ref 136–145)
SODIUM SERPL-SCNC: 143 MMOL/L (ref 136–145)
SODIUM SERPL-SCNC: 143 MMOL/L (ref 136–145)
SODIUM SERPL-SCNC: 144 MMOL/L (ref 136–145)
SPECIMEN EXPIRATION DATE: NORMAL
SYSTOLIC BLOOD PRESSURE - MUSE: NORMAL MMHG
T AXIS - MUSE: 108 DEGREES
TROPONIN I SERPL-MCNC: 0.1 NG/ML (ref 0–0.29)
TROPONIN I SERPL-MCNC: 0.13 NG/ML (ref 0–0.29)
TROPONIN I SERPL-MCNC: 0.14 NG/ML (ref 0–0.29)
TROPONIN I SERPL-MCNC: 0.14 NG/ML (ref 0–0.29)
UNIT ABO/RH: NORMAL
UNIT NUMBER: NORMAL
UNIT STATUS: NORMAL
UNIT TYPE ISBT: 7300
UNIT TYPE ISBT: 8400
UNIT TYPE ISBT: 8400
VENTRICULAR RATE- MUSE: 82 BPM
WBC # BLD AUTO: 11.3 10E3/UL (ref 4–11)
WBC # BLD AUTO: 11.8 10E3/UL (ref 4–11)
WBC # BLD AUTO: 11.9 10E3/UL (ref 4–11)
WBC # BLD AUTO: 13.3 10E3/UL (ref 4–11)
WBC # BLD AUTO: 13.4 10E3/UL (ref 4–11)
WBC # BLD AUTO: 13.5 10E3/UL (ref 4–11)
WBC # BLD AUTO: 14.1 10E3/UL (ref 4–11)
WBC # BLD AUTO: 14.2 10E3/UL (ref 4–11)
WBC # BLD AUTO: 14.2 10E3/UL (ref 4–11)
WBC # BLD AUTO: 16.1 10E3/UL (ref 4–11)
WBC # BLD AUTO: 16.5 10E3/UL (ref 4–11)
WBC # BLD AUTO: 18.4 10E3/UL (ref 4–11)
WBC # BLD AUTO: 18.7 10E3/UL (ref 4–11)
WBC # BLD AUTO: 19.1 10E3/UL (ref 4–11)

## 2022-01-01 PROCEDURE — 250N000009 HC RX 250: Performed by: STUDENT IN AN ORGANIZED HEALTH CARE EDUCATION/TRAINING PROGRAM

## 2022-01-01 PROCEDURE — 250N000012 HC RX MED GY IP 250 OP 636 PS 637: Performed by: FAMILY MEDICINE

## 2022-01-01 PROCEDURE — 120N000001 HC R&B MED SURG/OB

## 2022-01-01 PROCEDURE — 85025 COMPLETE CBC W/AUTO DIFF WBC: CPT | Performed by: EMERGENCY MEDICINE

## 2022-01-01 PROCEDURE — 250N000011 HC RX IP 250 OP 636: Performed by: STUDENT IN AN ORGANIZED HEALTH CARE EDUCATION/TRAINING PROGRAM

## 2022-01-01 PROCEDURE — G0463 HOSPITAL OUTPT CLINIC VISIT: HCPCS

## 2022-01-01 PROCEDURE — 80048 BASIC METABOLIC PNL TOTAL CA: CPT | Mod: ORL | Performed by: INTERNAL MEDICINE

## 2022-01-01 PROCEDURE — 99233 SBSQ HOSP IP/OBS HIGH 50: CPT | Performed by: INTERNAL MEDICINE

## 2022-01-01 PROCEDURE — 83036 HEMOGLOBIN GLYCOSYLATED A1C: CPT | Mod: ORL | Performed by: INTERNAL MEDICINE

## 2022-01-01 PROCEDURE — 96368 THER/DIAG CONCURRENT INF: CPT

## 2022-01-01 PROCEDURE — 36415 COLL VENOUS BLD VENIPUNCTURE: CPT | Performed by: HOSPITALIST

## 2022-01-01 PROCEDURE — 97530 THERAPEUTIC ACTIVITIES: CPT | Mod: GP

## 2022-01-01 PROCEDURE — 250N000013 HC RX MED GY IP 250 OP 250 PS 637: Performed by: STUDENT IN AN ORGANIZED HEALTH CARE EDUCATION/TRAINING PROGRAM

## 2022-01-01 PROCEDURE — 36415 COLL VENOUS BLD VENIPUNCTURE: CPT | Performed by: STUDENT IN AN ORGANIZED HEALTH CARE EDUCATION/TRAINING PROGRAM

## 2022-01-01 PROCEDURE — 87635 SARS-COV-2 COVID-19 AMP PRB: CPT | Performed by: INTERNAL MEDICINE

## 2022-01-01 PROCEDURE — 99233 SBSQ HOSP IP/OBS HIGH 50: CPT | Performed by: HOSPITALIST

## 2022-01-01 PROCEDURE — 999N000208 ECHOCARDIOGRAM COMPLETE

## 2022-01-01 PROCEDURE — 84484 ASSAY OF TROPONIN QUANT: CPT | Performed by: STUDENT IN AN ORGANIZED HEALTH CARE EDUCATION/TRAINING PROGRAM

## 2022-01-01 PROCEDURE — 71045 X-RAY EXAM CHEST 1 VIEW: CPT

## 2022-01-01 PROCEDURE — U0003 INFECTIOUS AGENT DETECTION BY NUCLEIC ACID (DNA OR RNA); SEVERE ACUTE RESPIRATORY SYNDROME CORONAVIRUS 2 (SARS-COV-2) (CORONAVIRUS DISEASE [COVID-19]), AMPLIFIED PROBE TECHNIQUE, MAKING USE OF HIGH THROUGHPUT TECHNOLOGIES AS DESCRIBED BY CMS-2020-01-R: HCPCS | Mod: ORL | Performed by: FAMILY MEDICINE

## 2022-01-01 PROCEDURE — 99231 SBSQ HOSP IP/OBS SF/LOW 25: CPT | Performed by: INTERNAL MEDICINE

## 2022-01-01 PROCEDURE — 250N000013 HC RX MED GY IP 250 OP 250 PS 637: Performed by: HOSPITALIST

## 2022-01-01 PROCEDURE — 99285 EMERGENCY DEPT VISIT HI MDM: CPT | Mod: 25

## 2022-01-01 PROCEDURE — 96376 TX/PRO/DX INJ SAME DRUG ADON: CPT

## 2022-01-01 PROCEDURE — 97110 THERAPEUTIC EXERCISES: CPT | Mod: GP

## 2022-01-01 PROCEDURE — 97116 GAIT TRAINING THERAPY: CPT | Mod: GP

## 2022-01-01 PROCEDURE — 36415 COLL VENOUS BLD VENIPUNCTURE: CPT | Performed by: INTERNAL MEDICINE

## 2022-01-01 PROCEDURE — 82272 OCCULT BLD FECES 1-3 TESTS: CPT | Performed by: HOSPITALIST

## 2022-01-01 PROCEDURE — 99239 HOSP IP/OBS DSCHRG MGMT >30: CPT | Performed by: INTERNAL MEDICINE

## 2022-01-01 PROCEDURE — C9803 HOPD COVID-19 SPEC COLLECT: HCPCS

## 2022-01-01 PROCEDURE — 250N000011 HC RX IP 250 OP 636: Performed by: FAMILY MEDICINE

## 2022-01-01 PROCEDURE — 99310 SBSQ NF CARE HIGH MDM 45: CPT | Performed by: FAMILY MEDICINE

## 2022-01-01 PROCEDURE — 250N000013 HC RX MED GY IP 250 OP 250 PS 637: Performed by: INTERNAL MEDICINE

## 2022-01-01 PROCEDURE — 85027 COMPLETE CBC AUTOMATED: CPT | Performed by: HOSPITALIST

## 2022-01-01 PROCEDURE — 250N000011 HC RX IP 250 OP 636: Performed by: HOSPITALIST

## 2022-01-01 PROCEDURE — 83605 ASSAY OF LACTIC ACID: CPT | Performed by: HOSPITALIST

## 2022-01-01 PROCEDURE — P9604 ONE-WAY ALLOW PRORATED TRIP: HCPCS | Mod: ORL | Performed by: INTERNAL MEDICINE

## 2022-01-01 PROCEDURE — 80048 BASIC METABOLIC PNL TOTAL CA: CPT | Performed by: INTERNAL MEDICINE

## 2022-01-01 PROCEDURE — 85027 COMPLETE CBC AUTOMATED: CPT | Performed by: FAMILY MEDICINE

## 2022-01-01 PROCEDURE — 99309 SBSQ NF CARE MODERATE MDM 30: CPT | Performed by: NURSE PRACTITIONER

## 2022-01-01 PROCEDURE — 250N000009 HC RX 250: Performed by: HOSPITALIST

## 2022-01-01 PROCEDURE — P9016 RBC LEUKOCYTES REDUCED: HCPCS | Performed by: HOSPITALIST

## 2022-01-01 PROCEDURE — 83880 ASSAY OF NATRIURETIC PEPTIDE: CPT | Mod: ORL | Performed by: INTERNAL MEDICINE

## 2022-01-01 PROCEDURE — 85027 COMPLETE CBC AUTOMATED: CPT | Mod: ORL | Performed by: INTERNAL MEDICINE

## 2022-01-01 PROCEDURE — 84484 ASSAY OF TROPONIN QUANT: CPT | Performed by: EMERGENCY MEDICINE

## 2022-01-01 PROCEDURE — 94640 AIRWAY INHALATION TREATMENT: CPT | Mod: 76

## 2022-01-01 PROCEDURE — 80053 COMPREHEN METABOLIC PANEL: CPT | Performed by: INTERNAL MEDICINE

## 2022-01-01 PROCEDURE — 96375 TX/PRO/DX INJ NEW DRUG ADDON: CPT

## 2022-01-01 PROCEDURE — 96366 THER/PROPH/DIAG IV INF ADDON: CPT

## 2022-01-01 PROCEDURE — 82310 ASSAY OF CALCIUM: CPT | Performed by: EMERGENCY MEDICINE

## 2022-01-01 PROCEDURE — 36415 COLL VENOUS BLD VENIPUNCTURE: CPT | Performed by: FAMILY MEDICINE

## 2022-01-01 PROCEDURE — 84145 PROCALCITONIN (PCT): CPT | Performed by: FAMILY MEDICINE

## 2022-01-01 PROCEDURE — 36415 COLL VENOUS BLD VENIPUNCTURE: CPT | Mod: ORL | Performed by: INTERNAL MEDICINE

## 2022-01-01 PROCEDURE — 80048 BASIC METABOLIC PNL TOTAL CA: CPT | Performed by: FAMILY MEDICINE

## 2022-01-01 PROCEDURE — 84145 PROCALCITONIN (PCT): CPT | Performed by: HOSPITALIST

## 2022-01-01 PROCEDURE — P9604 ONE-WAY ALLOW PRORATED TRIP: HCPCS | Mod: ORL | Performed by: NURSE PRACTITIONER

## 2022-01-01 PROCEDURE — 250N000013 HC RX MED GY IP 250 OP 250 PS 637: Performed by: FAMILY MEDICINE

## 2022-01-01 PROCEDURE — 86901 BLOOD TYPING SEROLOGIC RH(D): CPT | Performed by: HOSPITALIST

## 2022-01-01 PROCEDURE — 73700 CT LOWER EXTREMITY W/O DYE: CPT | Mod: RT

## 2022-01-01 PROCEDURE — 97535 SELF CARE MNGMENT TRAINING: CPT | Mod: GO

## 2022-01-01 PROCEDURE — 36415 COLL VENOUS BLD VENIPUNCTURE: CPT | Performed by: EMERGENCY MEDICINE

## 2022-01-01 PROCEDURE — 96365 THER/PROPH/DIAG IV INF INIT: CPT

## 2022-01-01 PROCEDURE — 73552 X-RAY EXAM OF FEMUR 2/>: CPT | Mod: RT

## 2022-01-01 PROCEDURE — 255N000002 HC RX 255 OP 636: Performed by: FAMILY MEDICINE

## 2022-01-01 PROCEDURE — 250N000011 HC RX IP 250 OP 636: Performed by: EMERGENCY MEDICINE

## 2022-01-01 PROCEDURE — 99232 SBSQ HOSP IP/OBS MODERATE 35: CPT | Performed by: STUDENT IN AN ORGANIZED HEALTH CARE EDUCATION/TRAINING PROGRAM

## 2022-01-01 PROCEDURE — 85018 HEMOGLOBIN: CPT | Performed by: HOSPITALIST

## 2022-01-01 PROCEDURE — 96361 HYDRATE IV INFUSION ADD-ON: CPT

## 2022-01-01 PROCEDURE — 258N000003 HC RX IP 258 OP 636: Performed by: STUDENT IN AN ORGANIZED HEALTH CARE EDUCATION/TRAINING PROGRAM

## 2022-01-01 PROCEDURE — 93306 TTE W/DOPPLER COMPLETE: CPT | Mod: 26 | Performed by: INTERNAL MEDICINE

## 2022-01-01 PROCEDURE — 80048 BASIC METABOLIC PNL TOTAL CA: CPT | Mod: ORL | Performed by: NURSE PRACTITIONER

## 2022-01-01 PROCEDURE — 80048 BASIC METABOLIC PNL TOTAL CA: CPT | Performed by: STUDENT IN AN ORGANIZED HEALTH CARE EDUCATION/TRAINING PROGRAM

## 2022-01-01 PROCEDURE — 85027 COMPLETE CBC AUTOMATED: CPT | Mod: ORL | Performed by: NURSE PRACTITIONER

## 2022-01-01 PROCEDURE — 83036 HEMOGLOBIN GLYCOSYLATED A1C: CPT | Performed by: STUDENT IN AN ORGANIZED HEALTH CARE EDUCATION/TRAINING PROGRAM

## 2022-01-01 PROCEDURE — U0003 INFECTIOUS AGENT DETECTION BY NUCLEIC ACID (DNA OR RNA); SEVERE ACUTE RESPIRATORY SYNDROME CORONAVIRUS 2 (SARS-COV-2) (CORONAVIRUS DISEASE [COVID-19]), AMPLIFIED PROBE TECHNIQUE, MAKING USE OF HIGH THROUGHPUT TECHNOLOGIES AS DESCRIBED BY CMS-2020-01-R: HCPCS | Performed by: FAMILY MEDICINE

## 2022-01-01 PROCEDURE — 86923 COMPATIBILITY TEST ELECTRIC: CPT | Performed by: HOSPITALIST

## 2022-01-01 PROCEDURE — 99306 1ST NF CARE HIGH MDM 50: CPT | Performed by: FAMILY MEDICINE

## 2022-01-01 PROCEDURE — 80048 BASIC METABOLIC PNL TOTAL CA: CPT | Performed by: HOSPITALIST

## 2022-01-01 PROCEDURE — 250N000012 HC RX MED GY IP 250 OP 636 PS 637: Performed by: STUDENT IN AN ORGANIZED HEALTH CARE EDUCATION/TRAINING PROGRAM

## 2022-01-01 PROCEDURE — 86923 COMPATIBILITY TEST ELECTRIC: CPT | Performed by: STUDENT IN AN ORGANIZED HEALTH CARE EDUCATION/TRAINING PROGRAM

## 2022-01-01 PROCEDURE — 94640 AIRWAY INHALATION TREATMENT: CPT

## 2022-01-01 PROCEDURE — 999N000157 HC STATISTIC RCP TIME EA 10 MIN

## 2022-01-01 PROCEDURE — P9016 RBC LEUKOCYTES REDUCED: HCPCS | Performed by: STUDENT IN AN ORGANIZED HEALTH CARE EDUCATION/TRAINING PROGRAM

## 2022-01-01 PROCEDURE — 85027 COMPLETE CBC AUTOMATED: CPT | Performed by: INTERNAL MEDICINE

## 2022-01-01 PROCEDURE — 250N000009 HC RX 250: Performed by: FAMILY MEDICINE

## 2022-01-01 PROCEDURE — 99233 SBSQ HOSP IP/OBS HIGH 50: CPT | Performed by: FAMILY MEDICINE

## 2022-01-01 PROCEDURE — 87636 SARSCOV2 & INF A&B AMP PRB: CPT | Performed by: EMERGENCY MEDICINE

## 2022-01-01 PROCEDURE — 85027 COMPLETE CBC AUTOMATED: CPT | Performed by: STUDENT IN AN ORGANIZED HEALTH CARE EDUCATION/TRAINING PROGRAM

## 2022-01-01 PROCEDURE — 36415 COLL VENOUS BLD VENIPUNCTURE: CPT | Mod: ORL | Performed by: NURSE PRACTITIONER

## 2022-01-01 PROCEDURE — 93005 ELECTROCARDIOGRAM TRACING: CPT | Performed by: EMERGENCY MEDICINE

## 2022-01-01 PROCEDURE — 83605 ASSAY OF LACTIC ACID: CPT | Performed by: INTERNAL MEDICINE

## 2022-01-01 PROCEDURE — 250N000013 HC RX MED GY IP 250 OP 250 PS 637: Performed by: EMERGENCY MEDICINE

## 2022-01-01 PROCEDURE — 82040 ASSAY OF SERUM ALBUMIN: CPT | Performed by: INTERNAL MEDICINE

## 2022-01-01 PROCEDURE — 74176 CT ABD & PELVIS W/O CONTRAST: CPT

## 2022-01-01 PROCEDURE — 250N000009 HC RX 250: Performed by: EMERGENCY MEDICINE

## 2022-01-01 PROCEDURE — P9603 ONE-WAY ALLOW PRORATED MILES: HCPCS | Mod: ORL | Performed by: INTERNAL MEDICINE

## 2022-01-01 PROCEDURE — 97162 PT EVAL MOD COMPLEX 30 MIN: CPT | Mod: GP

## 2022-01-01 PROCEDURE — 82310 ASSAY OF CALCIUM: CPT | Performed by: HOSPITALIST

## 2022-01-01 PROCEDURE — 97166 OT EVAL MOD COMPLEX 45 MIN: CPT | Mod: GO

## 2022-01-01 PROCEDURE — 83880 ASSAY OF NATRIURETIC PEPTIDE: CPT | Performed by: EMERGENCY MEDICINE

## 2022-01-01 PROCEDURE — 93971 EXTREMITY STUDY: CPT | Mod: RT

## 2022-01-01 PROCEDURE — 250N000012 HC RX MED GY IP 250 OP 636 PS 637: Performed by: HOSPITALIST

## 2022-01-01 RX ORDER — PIPERACILLIN SODIUM, TAZOBACTAM SODIUM 3; .375 G/15ML; G/15ML
3.38 INJECTION, POWDER, LYOPHILIZED, FOR SOLUTION INTRAVENOUS ONCE
Status: COMPLETED | OUTPATIENT
Start: 2022-01-01 | End: 2022-01-01

## 2022-01-01 RX ORDER — TAMSULOSIN HYDROCHLORIDE 0.4 MG/1
0.4 CAPSULE ORAL AT BEDTIME
COMMUNITY

## 2022-01-01 RX ORDER — ALBUTEROL SULFATE 5 MG/ML
2.5 SOLUTION RESPIRATORY (INHALATION) ONCE
Status: COMPLETED | OUTPATIENT
Start: 2022-01-01 | End: 2022-01-01

## 2022-01-01 RX ORDER — ATORVASTATIN CALCIUM 80 MG/1
80 TABLET, FILM COATED ORAL AT BEDTIME
COMMUNITY

## 2022-01-01 RX ORDER — DOXYCYCLINE 100 MG/10ML
100 INJECTION, POWDER, LYOPHILIZED, FOR SOLUTION INTRAVENOUS EVERY 12 HOURS
Status: DISCONTINUED | OUTPATIENT
Start: 2022-01-01 | End: 2022-01-01

## 2022-01-01 RX ORDER — TRAMADOL HYDROCHLORIDE 50 MG/1
25-50 TABLET ORAL 2 TIMES DAILY PRN
Status: ON HOLD | COMMUNITY
End: 2022-01-01

## 2022-01-01 RX ORDER — ACETAMINOPHEN 325 MG/1
650 TABLET ORAL EVERY 6 HOURS PRN
Status: DISCONTINUED | OUTPATIENT
Start: 2022-01-01 | End: 2022-01-01 | Stop reason: HOSPADM

## 2022-01-01 RX ORDER — HYDROMORPHONE HYDROCHLORIDE 2 MG/1
1 TABLET ORAL EVERY 6 HOURS PRN
Qty: 10 TABLET | Refills: 0 | Status: SHIPPED | OUTPATIENT
Start: 2022-01-01

## 2022-01-01 RX ORDER — IPRATROPIUM BROMIDE AND ALBUTEROL SULFATE 2.5; .5 MG/3ML; MG/3ML
1 SOLUTION RESPIRATORY (INHALATION) 4 TIMES DAILY
COMMUNITY
Start: 2022-01-01

## 2022-01-01 RX ORDER — LIDOCAINE 40 MG/G
CREAM TOPICAL
Status: DISCONTINUED | OUTPATIENT
Start: 2022-01-01 | End: 2022-01-01 | Stop reason: HOSPADM

## 2022-01-01 RX ORDER — NICOTINE POLACRILEX 4 MG
15-30 LOZENGE BUCCAL
Status: DISCONTINUED | OUTPATIENT
Start: 2022-01-01 | End: 2022-01-01 | Stop reason: HOSPADM

## 2022-01-01 RX ORDER — POLYETHYLENE GLYCOL 3350 17 G/17G
17 POWDER, FOR SOLUTION ORAL DAILY
Status: DISCONTINUED | OUTPATIENT
Start: 2022-01-01 | End: 2022-01-01 | Stop reason: HOSPADM

## 2022-01-01 RX ORDER — FUROSEMIDE 10 MG/ML
10 INJECTION INTRAMUSCULAR; INTRAVENOUS ONCE
Status: COMPLETED | OUTPATIENT
Start: 2022-01-01 | End: 2022-01-01

## 2022-01-01 RX ORDER — ALBUTEROL SULFATE 90 UG/1
1-6 AEROSOL, METERED RESPIRATORY (INHALATION) EVERY 6 HOURS PRN
Status: DISCONTINUED | OUTPATIENT
Start: 2022-01-01 | End: 2022-01-01 | Stop reason: HOSPADM

## 2022-01-01 RX ORDER — PANTOPRAZOLE SODIUM 40 MG/1
40 TABLET, DELAYED RELEASE ORAL DAILY
COMMUNITY

## 2022-01-01 RX ORDER — CARBIDOPA/LEVODOPA 10MG-100MG
1 TABLET ORAL 2 TIMES DAILY
Status: DISCONTINUED | OUTPATIENT
Start: 2022-01-01 | End: 2022-01-01 | Stop reason: HOSPADM

## 2022-01-01 RX ORDER — OSELTAMIVIR PHOSPHATE 30 MG/1
30 CAPSULE ORAL 2 TIMES DAILY
Status: COMPLETED | OUTPATIENT
Start: 2022-01-01 | End: 2022-01-01

## 2022-01-01 RX ORDER — ACETAMINOPHEN 325 MG/1
650 TABLET ORAL ONCE
Status: COMPLETED | OUTPATIENT
Start: 2022-01-01 | End: 2022-01-01

## 2022-01-01 RX ORDER — TORSEMIDE 20 MG/1
60 TABLET ORAL DAILY
Status: ON HOLD | COMMUNITY
End: 2022-01-01

## 2022-01-01 RX ORDER — PIPERACILLIN SODIUM, TAZOBACTAM SODIUM 3; .375 G/15ML; G/15ML
3.38 INJECTION, POWDER, LYOPHILIZED, FOR SOLUTION INTRAVENOUS EVERY 8 HOURS
Status: DISCONTINUED | OUTPATIENT
Start: 2022-01-01 | End: 2022-01-01

## 2022-01-01 RX ORDER — AMOXICILLIN 250 MG
2 CAPSULE ORAL 2 TIMES DAILY PRN
Status: DISCONTINUED | OUTPATIENT
Start: 2022-01-01 | End: 2022-01-01 | Stop reason: HOSPADM

## 2022-01-01 RX ORDER — ALBUTEROL SULFATE 0.83 MG/ML
2.5 SOLUTION RESPIRATORY (INHALATION) EVERY 6 HOURS PRN
Status: DISCONTINUED | OUTPATIENT
Start: 2022-01-01 | End: 2022-01-01 | Stop reason: HOSPADM

## 2022-01-01 RX ORDER — SODIUM CHLORIDE 9 MG/ML
INJECTION, SOLUTION INTRAVENOUS CONTINUOUS
Status: ACTIVE | OUTPATIENT
Start: 2022-01-01 | End: 2022-01-01

## 2022-01-01 RX ORDER — AMOXICILLIN 250 MG
2 CAPSULE ORAL AT BEDTIME
COMMUNITY

## 2022-01-01 RX ORDER — TRAMADOL HYDROCHLORIDE 50 MG/1
25 TABLET ORAL 2 TIMES DAILY PRN
Qty: 10 TABLET | Refills: 0 | Status: CANCELLED | OUTPATIENT
Start: 2022-01-01

## 2022-01-01 RX ORDER — FLUTICASONE PROPIONATE 50 MCG
2 SPRAY, SUSPENSION (ML) NASAL DAILY
Status: DISCONTINUED | OUTPATIENT
Start: 2022-01-01 | End: 2022-01-01 | Stop reason: HOSPADM

## 2022-01-01 RX ORDER — HYDROMORPHONE HYDROCHLORIDE 2 MG/1
1 TABLET ORAL EVERY 6 HOURS PRN
Qty: 10 TABLET | Refills: 0 | Status: SHIPPED | OUTPATIENT
Start: 2022-01-01 | End: 2022-01-01

## 2022-01-01 RX ORDER — ATORVASTATIN CALCIUM 40 MG/1
80 TABLET, FILM COATED ORAL AT BEDTIME
Status: DISCONTINUED | OUTPATIENT
Start: 2022-01-01 | End: 2022-01-01 | Stop reason: HOSPADM

## 2022-01-01 RX ORDER — CHLORAL HYDRATE 500 MG
1 CAPSULE ORAL DAILY
COMMUNITY

## 2022-01-01 RX ORDER — CARVEDILOL 3.12 MG/1
6.25 TABLET ORAL 2 TIMES DAILY WITH MEALS
Status: DISCONTINUED | OUTPATIENT
Start: 2022-01-01 | End: 2022-01-01 | Stop reason: HOSPADM

## 2022-01-01 RX ORDER — FUROSEMIDE 10 MG/ML
40 INJECTION INTRAMUSCULAR; INTRAVENOUS EVERY 12 HOURS
Status: COMPLETED | OUTPATIENT
Start: 2022-01-01 | End: 2022-01-01

## 2022-01-01 RX ORDER — BUSPIRONE HYDROCHLORIDE 7.5 MG/1
7.5 TABLET ORAL 2 TIMES DAILY
Status: DISCONTINUED | OUTPATIENT
Start: 2022-01-01 | End: 2022-01-01 | Stop reason: HOSPADM

## 2022-01-01 RX ORDER — ALBUTEROL SULFATE 90 UG/1
1-6 AEROSOL, METERED RESPIRATORY (INHALATION) EVERY 6 HOURS PRN
Status: DISCONTINUED | OUTPATIENT
Start: 2022-01-01 | End: 2022-01-01

## 2022-01-01 RX ORDER — IPRATROPIUM BROMIDE AND ALBUTEROL SULFATE 2.5; .5 MG/3ML; MG/3ML
3 SOLUTION RESPIRATORY (INHALATION)
Status: DISCONTINUED | OUTPATIENT
Start: 2022-01-01 | End: 2022-01-01

## 2022-01-01 RX ORDER — ONDANSETRON 4 MG/1
4 TABLET, ORALLY DISINTEGRATING ORAL EVERY 6 HOURS PRN
Status: DISCONTINUED | OUTPATIENT
Start: 2022-01-01 | End: 2022-01-01 | Stop reason: HOSPADM

## 2022-01-01 RX ORDER — POLYETHYLENE GLYCOL 3350 17 G/17G
17 POWDER, FOR SOLUTION ORAL DAILY
Start: 2022-01-01

## 2022-01-01 RX ORDER — ALBUTEROL SULFATE 5 MG/ML
2.5 SOLUTION RESPIRATORY (INHALATION)
Status: COMPLETED | OUTPATIENT
Start: 2022-01-01 | End: 2022-01-01

## 2022-01-01 RX ORDER — FUROSEMIDE 10 MG/ML
20 INJECTION INTRAMUSCULAR; INTRAVENOUS ONCE
Status: COMPLETED | OUTPATIENT
Start: 2022-01-01 | End: 2022-01-01

## 2022-01-01 RX ORDER — FERROUS SULFATE 325(65) MG
325 TABLET ORAL
COMMUNITY

## 2022-01-01 RX ORDER — ACETAMINOPHEN 325 MG/1
975 TABLET ORAL 3 TIMES DAILY
Start: 2022-01-01

## 2022-01-01 RX ORDER — NITROGLYCERIN 0.4 MG/1
0.4 TABLET SUBLINGUAL EVERY 5 MIN PRN
COMMUNITY

## 2022-01-01 RX ORDER — CARVEDILOL 6.25 MG/1
6.25 TABLET ORAL 2 TIMES DAILY WITH MEALS
COMMUNITY

## 2022-01-01 RX ORDER — DEXTROSE MONOHYDRATE 25 G/50ML
25-50 INJECTION, SOLUTION INTRAVENOUS
Status: DISCONTINUED | OUTPATIENT
Start: 2022-01-01 | End: 2022-01-01 | Stop reason: HOSPADM

## 2022-01-01 RX ORDER — FLUTICASONE PROPIONATE 50 MCG
2 SPRAY, SUSPENSION (ML) NASAL DAILY
COMMUNITY

## 2022-01-01 RX ORDER — METHYLPREDNISOLONE SODIUM SUCCINATE 40 MG/ML
40 INJECTION, POWDER, LYOPHILIZED, FOR SOLUTION INTRAMUSCULAR; INTRAVENOUS EVERY 8 HOURS
Status: COMPLETED | OUTPATIENT
Start: 2022-01-01 | End: 2022-01-01

## 2022-01-01 RX ORDER — LANOLIN ALCOHOL/MO/W.PET/CERES
9 CREAM (GRAM) TOPICAL AT BEDTIME
COMMUNITY

## 2022-01-01 RX ORDER — TORSEMIDE 20 MG/1
20 TABLET ORAL DAILY
Status: ON HOLD | COMMUNITY
End: 2022-01-01

## 2022-01-01 RX ORDER — LORAZEPAM 0.5 MG/1
0.5 TABLET ORAL EVERY 6 HOURS PRN
Status: DISCONTINUED | OUTPATIENT
Start: 2022-01-01 | End: 2022-01-01 | Stop reason: HOSPADM

## 2022-01-01 RX ORDER — ACETAMINOPHEN 650 MG/1
650 SUPPOSITORY RECTAL EVERY 6 HOURS PRN
Status: DISCONTINUED | OUTPATIENT
Start: 2022-01-01 | End: 2022-01-01 | Stop reason: HOSPADM

## 2022-01-01 RX ORDER — DULOXETIN HYDROCHLORIDE 20 MG/1
40 CAPSULE, DELAYED RELEASE ORAL DAILY
Status: DISCONTINUED | OUTPATIENT
Start: 2022-01-01 | End: 2022-01-01 | Stop reason: HOSPADM

## 2022-01-01 RX ORDER — TORSEMIDE 20 MG/1
60 TABLET ORAL DAILY
Status: DISCONTINUED | OUTPATIENT
Start: 2022-01-01 | End: 2022-01-01

## 2022-01-01 RX ORDER — TAMSULOSIN HYDROCHLORIDE 0.4 MG/1
0.4 CAPSULE ORAL AT BEDTIME
Status: DISCONTINUED | OUTPATIENT
Start: 2022-01-01 | End: 2022-01-01 | Stop reason: HOSPADM

## 2022-01-01 RX ORDER — ACETAMINOPHEN 325 MG/1
975 TABLET ORAL 3 TIMES DAILY
Status: DISCONTINUED | OUTPATIENT
Start: 2022-01-01 | End: 2022-01-01 | Stop reason: HOSPADM

## 2022-01-01 RX ORDER — AMOXICILLIN 250 MG
1 CAPSULE ORAL 2 TIMES DAILY PRN
Status: DISCONTINUED | OUTPATIENT
Start: 2022-01-01 | End: 2022-01-01 | Stop reason: HOSPADM

## 2022-01-01 RX ORDER — ALBUTEROL SULFATE 0.83 MG/ML
2.5 SOLUTION RESPIRATORY (INHALATION) EVERY 4 HOURS PRN
COMMUNITY
Start: 2022-01-01

## 2022-01-01 RX ORDER — GABAPENTIN 400 MG/1
400 CAPSULE ORAL 2 TIMES DAILY
COMMUNITY

## 2022-01-01 RX ORDER — FERROUS SULFATE 325(65) MG
325 TABLET ORAL
Status: DISCONTINUED | OUTPATIENT
Start: 2022-01-01 | End: 2022-01-01 | Stop reason: HOSPADM

## 2022-01-01 RX ORDER — HYDROMORPHONE HYDROCHLORIDE 1 MG/ML
0.2 INJECTION, SOLUTION INTRAMUSCULAR; INTRAVENOUS; SUBCUTANEOUS
Status: DISCONTINUED | OUTPATIENT
Start: 2022-01-01 | End: 2022-01-01 | Stop reason: HOSPADM

## 2022-01-01 RX ORDER — TORSEMIDE 20 MG/1
40 TABLET ORAL DAILY
Start: 2022-01-01

## 2022-01-01 RX ORDER — ALBUTEROL SULFATE 90 UG/1
1-6 AEROSOL, METERED RESPIRATORY (INHALATION) EVERY 6 HOURS PRN
COMMUNITY
End: 2022-01-01

## 2022-01-01 RX ORDER — PREDNISONE 20 MG/1
40 TABLET ORAL DAILY
Status: COMPLETED | OUTPATIENT
Start: 2022-01-01 | End: 2022-01-01

## 2022-01-01 RX ORDER — ONDANSETRON 2 MG/ML
4 INJECTION INTRAMUSCULAR; INTRAVENOUS EVERY 6 HOURS PRN
Status: DISCONTINUED | OUTPATIENT
Start: 2022-01-01 | End: 2022-01-01 | Stop reason: HOSPADM

## 2022-01-01 RX ORDER — ACETAMINOPHEN 325 MG/1
650 TABLET ORAL EVERY 6 HOURS PRN
Status: ON HOLD | COMMUNITY
End: 2022-01-01

## 2022-01-01 RX ORDER — ALBUTEROL SULFATE 5 MG/ML
2.5 SOLUTION RESPIRATORY (INHALATION) EVERY 6 HOURS PRN
Status: DISCONTINUED | OUTPATIENT
Start: 2022-01-01 | End: 2022-01-01

## 2022-01-01 RX ORDER — OSELTAMIVIR PHOSPHATE 30 MG/1
30 CAPSULE ORAL ONCE
Status: COMPLETED | OUTPATIENT
Start: 2022-01-01 | End: 2022-01-01

## 2022-01-01 RX ORDER — DULOXETIN HYDROCHLORIDE 20 MG/1
40 CAPSULE, DELAYED RELEASE ORAL DAILY
COMMUNITY

## 2022-01-01 RX ORDER — BUSPIRONE HYDROCHLORIDE 7.5 MG/1
7.5 TABLET ORAL 2 TIMES DAILY
COMMUNITY

## 2022-01-01 RX ORDER — TORSEMIDE 20 MG/1
20 TABLET ORAL DAILY
Status: DISCONTINUED | OUTPATIENT
Start: 2022-01-01 | End: 2022-01-01

## 2022-01-01 RX ORDER — PANTOPRAZOLE SODIUM 40 MG/1
40 TABLET, DELAYED RELEASE ORAL DAILY
Status: DISCONTINUED | OUTPATIENT
Start: 2022-01-01 | End: 2022-01-01 | Stop reason: HOSPADM

## 2022-01-01 RX ORDER — TORSEMIDE 20 MG/1
40 TABLET ORAL DAILY
Status: DISCONTINUED | OUTPATIENT
Start: 2022-01-01 | End: 2022-01-01 | Stop reason: HOSPADM

## 2022-01-01 RX ADMIN — INSULIN ASPART 2 UNITS: 100 INJECTION, SOLUTION INTRAVENOUS; SUBCUTANEOUS at 12:55

## 2022-01-01 RX ADMIN — UMECLIDINIUM 1 PUFF: 62.5 AEROSOL, POWDER ORAL at 08:32

## 2022-01-01 RX ADMIN — IPRATROPIUM BROMIDE AND ALBUTEROL 1 PUFF: 20; 100 SPRAY, METERED RESPIRATORY (INHALATION) at 21:04

## 2022-01-01 RX ADMIN — DULOXETINE HYDROCHLORIDE 40 MG: 20 CAPSULE, DELAYED RELEASE ORAL at 16:25

## 2022-01-01 RX ADMIN — FERROUS SULFATE TAB 325 MG (65 MG ELEMENTAL FE) 325 MG: 325 (65 FE) TAB at 09:00

## 2022-01-01 RX ADMIN — ATORVASTATIN CALCIUM 80 MG: 40 TABLET, FILM COATED ORAL at 22:56

## 2022-01-01 RX ADMIN — PANTOPRAZOLE SODIUM 40 MG: 20 TABLET, DELAYED RELEASE ORAL at 08:07

## 2022-01-01 RX ADMIN — CARBIDOPA AND LEVODOPA 1 TABLET: 10; 100 TABLET ORAL at 20:20

## 2022-01-01 RX ADMIN — INSULIN ASPART 5 UNITS: 100 INJECTION, SOLUTION INTRAVENOUS; SUBCUTANEOUS at 18:40

## 2022-01-01 RX ADMIN — POLYETHYLENE GLYCOL 3350 17 G: 17 POWDER, FOR SOLUTION ORAL at 16:46

## 2022-01-01 RX ADMIN — PIPERACILLIN SODIUM AND TAZOBACTAM SODIUM 3.38 G: 3; .375 INJECTION, POWDER, LYOPHILIZED, FOR SOLUTION INTRAVENOUS at 20:20

## 2022-01-01 RX ADMIN — ATORVASTATIN CALCIUM 80 MG: 40 TABLET, FILM COATED ORAL at 21:52

## 2022-01-01 RX ADMIN — ACETAMINOPHEN 650 MG: 325 TABLET ORAL at 11:32

## 2022-01-01 RX ADMIN — BUSPIRONE HYDROCHLORIDE 7.5 MG: 7.5 TABLET ORAL at 21:28

## 2022-01-01 RX ADMIN — OSELTAMIVIR PHOSPHATE 30 MG: 30 CAPSULE ORAL at 20:20

## 2022-01-01 RX ADMIN — INSULIN ASPART 3 UNITS: 100 INJECTION, SOLUTION INTRAVENOUS; SUBCUTANEOUS at 12:24

## 2022-01-01 RX ADMIN — BUSPIRONE HYDROCHLORIDE 7.5 MG: 15 TABLET ORAL at 10:25

## 2022-01-01 RX ADMIN — ATORVASTATIN CALCIUM 80 MG: 40 TABLET, FILM COATED ORAL at 21:18

## 2022-01-01 RX ADMIN — CARVEDILOL 6.25 MG: 3.12 TABLET, FILM COATED ORAL at 10:20

## 2022-01-01 RX ADMIN — IPRATROPIUM BROMIDE AND ALBUTEROL 1 PUFF: 20; 100 SPRAY, METERED RESPIRATORY (INHALATION) at 21:03

## 2022-01-01 RX ADMIN — IPRATROPIUM BROMIDE AND ALBUTEROL 1 PUFF: 20; 100 SPRAY, METERED RESPIRATORY (INHALATION) at 21:20

## 2022-01-01 RX ADMIN — HYDROMORPHONE HYDROCHLORIDE 1 MG: 2 TABLET ORAL at 16:24

## 2022-01-01 RX ADMIN — TORSEMIDE 40 MG: 20 TABLET ORAL at 09:01

## 2022-01-01 RX ADMIN — CARBIDOPA AND LEVODOPA 1 TABLET: 10; 100 TABLET ORAL at 21:03

## 2022-01-01 RX ADMIN — CARVEDILOL 6.25 MG: 3.12 TABLET, FILM COATED ORAL at 17:44

## 2022-01-01 RX ADMIN — FLUTICASONE FUROATE AND VILANTEROL TRIFENATATE 1 PUFF: 100; 25 POWDER RESPIRATORY (INHALATION) at 08:45

## 2022-01-01 RX ADMIN — CARBIDOPA AND LEVODOPA 1 TABLET: 10; 100 TABLET ORAL at 08:45

## 2022-01-01 RX ADMIN — DULOXETINE HYDROCHLORIDE 40 MG: 20 CAPSULE, DELAYED RELEASE ORAL at 08:33

## 2022-01-01 RX ADMIN — ACETAMINOPHEN 975 MG: 325 TABLET ORAL at 13:57

## 2022-01-01 RX ADMIN — IPRATROPIUM BROMIDE AND ALBUTEROL 1 PUFF: 20; 100 SPRAY, METERED RESPIRATORY (INHALATION) at 15:00

## 2022-01-01 RX ADMIN — CARBIDOPA AND LEVODOPA 1 TABLET: 10; 100 TABLET ORAL at 21:04

## 2022-01-01 RX ADMIN — PREDNISONE 40 MG: 20 TABLET ORAL at 08:33

## 2022-01-01 RX ADMIN — HYDROMORPHONE HYDROCHLORIDE 1 MG: 2 TABLET ORAL at 10:49

## 2022-01-01 RX ADMIN — UMECLIDINIUM 1 PUFF: 62.5 AEROSOL, POWDER ORAL at 08:55

## 2022-01-01 RX ADMIN — OSELTAMIVIR PHOSPHATE 30 MG: 30 CAPSULE ORAL at 08:33

## 2022-01-01 RX ADMIN — INSULIN ASPART 3 UNITS: 100 INJECTION, SOLUTION INTRAVENOUS; SUBCUTANEOUS at 17:56

## 2022-01-01 RX ADMIN — GABAPENTIN 400 MG: 100 CAPSULE ORAL at 08:44

## 2022-01-01 RX ADMIN — CARVEDILOL 6.25 MG: 3.12 TABLET, FILM COATED ORAL at 19:23

## 2022-01-01 RX ADMIN — ATORVASTATIN CALCIUM 80 MG: 40 TABLET, FILM COATED ORAL at 21:01

## 2022-01-01 RX ADMIN — FLUTICASONE FUROATE AND VILANTEROL TRIFENATATE 1 PUFF: 100; 25 POWDER RESPIRATORY (INHALATION) at 09:06

## 2022-01-01 RX ADMIN — FLUTICASONE PROPIONATE 2 SPRAY: 50 SPRAY, METERED NASAL at 09:02

## 2022-01-01 RX ADMIN — HYDROMORPHONE HYDROCHLORIDE 1 MG: 2 TABLET ORAL at 01:17

## 2022-01-01 RX ADMIN — ACETAMINOPHEN 650 MG: 325 TABLET ORAL at 14:16

## 2022-01-01 RX ADMIN — Medication 1 MG: at 02:11

## 2022-01-01 RX ADMIN — HYDROMORPHONE HYDROCHLORIDE 1 MG: 2 TABLET ORAL at 01:13

## 2022-01-01 RX ADMIN — ATORVASTATIN CALCIUM 80 MG: 40 TABLET, FILM COATED ORAL at 21:28

## 2022-01-01 RX ADMIN — IPRATROPIUM BROMIDE AND ALBUTEROL 1 PUFF: 20; 100 SPRAY, METERED RESPIRATORY (INHALATION) at 09:04

## 2022-01-01 RX ADMIN — FLUTICASONE FUROATE AND VILANTEROL TRIFENATATE 1 PUFF: 100; 25 POWDER RESPIRATORY (INHALATION) at 10:20

## 2022-01-01 RX ADMIN — FERROUS SULFATE TAB 325 MG (65 MG ELEMENTAL FE) 325 MG: 325 (65 FE) TAB at 13:50

## 2022-01-01 RX ADMIN — ACETAMINOPHEN 650 MG: 325 TABLET ORAL at 01:01

## 2022-01-01 RX ADMIN — IPRATROPIUM BROMIDE AND ALBUTEROL 1 PUFF: 20; 100 SPRAY, METERED RESPIRATORY (INHALATION) at 18:43

## 2022-01-01 RX ADMIN — METHYLPREDNISOLONE SODIUM SUCCINATE 40 MG: 40 INJECTION, POWDER, FOR SOLUTION INTRAMUSCULAR; INTRAVENOUS at 05:48

## 2022-01-01 RX ADMIN — FERROUS SULFATE TAB 325 MG (65 MG ELEMENTAL FE) 325 MG: 325 (65 FE) TAB at 08:38

## 2022-01-01 RX ADMIN — FERROUS SULFATE TAB 325 MG (65 MG ELEMENTAL FE) 325 MG: 325 (65 FE) TAB at 10:25

## 2022-01-01 RX ADMIN — IPRATROPIUM BROMIDE AND ALBUTEROL 1 PUFF: 20; 100 SPRAY, METERED RESPIRATORY (INHALATION) at 16:47

## 2022-01-01 RX ADMIN — PIPERACILLIN SODIUM AND TAZOBACTAM SODIUM 3.38 G: 3; .375 INJECTION, POWDER, LYOPHILIZED, FOR SOLUTION INTRAVENOUS at 10:24

## 2022-01-01 RX ADMIN — SALINE NASAL SPRAY 1 SPRAY: 1.5 SOLUTION NASAL at 16:08

## 2022-01-01 RX ADMIN — CARBIDOPA AND LEVODOPA 1 TABLET: 10; 100 TABLET ORAL at 19:24

## 2022-01-01 RX ADMIN — CARVEDILOL 6.25 MG: 3.12 TABLET, FILM COATED ORAL at 10:24

## 2022-01-01 RX ADMIN — SALINE NASAL SPRAY 1 SPRAY: 1.5 SOLUTION NASAL at 09:03

## 2022-01-01 RX ADMIN — ACETAMINOPHEN 975 MG: 325 TABLET ORAL at 13:24

## 2022-01-01 RX ADMIN — BUSPIRONE HYDROCHLORIDE 7.5 MG: 15 TABLET ORAL at 08:38

## 2022-01-01 RX ADMIN — FLUTICASONE PROPIONATE 2 SPRAY: 50 SPRAY, METERED NASAL at 08:17

## 2022-01-01 RX ADMIN — BUSPIRONE HYDROCHLORIDE 7.5 MG: 15 TABLET ORAL at 08:07

## 2022-01-01 RX ADMIN — ACETAMINOPHEN 650 MG: 325 TABLET ORAL at 10:50

## 2022-01-01 RX ADMIN — GABAPENTIN 400 MG: 100 CAPSULE ORAL at 21:18

## 2022-01-01 RX ADMIN — UMECLIDINIUM 1 PUFF: 62.5 AEROSOL, POWDER ORAL at 10:20

## 2022-01-01 RX ADMIN — FLUTICASONE FUROATE AND VILANTEROL TRIFENATATE 1 PUFF: 100; 25 POWDER RESPIRATORY (INHALATION) at 08:09

## 2022-01-01 RX ADMIN — INSULIN ASPART 1 UNITS: 100 INJECTION, SOLUTION INTRAVENOUS; SUBCUTANEOUS at 08:31

## 2022-01-01 RX ADMIN — HYDROMORPHONE HYDROCHLORIDE 1 MG: 2 TABLET ORAL at 20:27

## 2022-01-01 RX ADMIN — SALINE NASAL SPRAY 1 SPRAY: 1.5 SOLUTION NASAL at 08:29

## 2022-01-01 RX ADMIN — BUSPIRONE HYDROCHLORIDE 7.5 MG: 15 TABLET ORAL at 08:33

## 2022-01-01 RX ADMIN — METHYLPREDNISOLONE SODIUM SUCCINATE 40 MG: 40 INJECTION, POWDER, FOR SOLUTION INTRAMUSCULAR; INTRAVENOUS at 14:53

## 2022-01-01 RX ADMIN — CARVEDILOL 6.25 MG: 3.12 TABLET, FILM COATED ORAL at 17:36

## 2022-01-01 RX ADMIN — INSULIN ASPART 2 UNITS: 100 INJECTION, SOLUTION INTRAVENOUS; SUBCUTANEOUS at 12:05

## 2022-01-01 RX ADMIN — OSELTAMIVIR PHOSPHATE 30 MG: 30 CAPSULE ORAL at 10:24

## 2022-01-01 RX ADMIN — ALBUTEROL SULFATE 1 PUFF: 90 AEROSOL, METERED RESPIRATORY (INHALATION) at 08:46

## 2022-01-01 RX ADMIN — METHYLPREDNISOLONE SODIUM SUCCINATE 40 MG: 40 INJECTION, POWDER, FOR SOLUTION INTRAMUSCULAR; INTRAVENOUS at 16:27

## 2022-01-01 RX ADMIN — FLUTICASONE PROPIONATE 2 SPRAY: 50 SPRAY, METERED NASAL at 08:54

## 2022-01-01 RX ADMIN — IPRATROPIUM BROMIDE AND ALBUTEROL 1 PUFF: 20; 100 SPRAY, METERED RESPIRATORY (INHALATION) at 13:13

## 2022-01-01 RX ADMIN — ALBUTEROL SULFATE 3 PUFF: 90 AEROSOL, METERED RESPIRATORY (INHALATION) at 03:47

## 2022-01-01 RX ADMIN — UMECLIDINIUM 1 PUFF: 62.5 AEROSOL, POWDER ORAL at 16:20

## 2022-01-01 RX ADMIN — TAMSULOSIN HYDROCHLORIDE 0.4 MG: 0.4 CAPSULE ORAL at 21:28

## 2022-01-01 RX ADMIN — LORAZEPAM 0.5 MG: 0.5 TABLET ORAL at 22:41

## 2022-01-01 RX ADMIN — CARVEDILOL 6.25 MG: 3.12 TABLET, FILM COATED ORAL at 09:10

## 2022-01-01 RX ADMIN — HYDROMORPHONE HYDROCHLORIDE 1 MG: 2 TABLET ORAL at 14:16

## 2022-01-01 RX ADMIN — TAMSULOSIN HYDROCHLORIDE 0.4 MG: 0.4 CAPSULE ORAL at 22:56

## 2022-01-01 RX ADMIN — ACETAMINOPHEN 650 MG: 325 TABLET ORAL at 08:50

## 2022-01-01 RX ADMIN — SALINE NASAL SPRAY 1 SPRAY: 1.5 SOLUTION NASAL at 10:28

## 2022-01-01 RX ADMIN — CARVEDILOL 6.25 MG: 3.12 TABLET, FILM COATED ORAL at 08:28

## 2022-01-01 RX ADMIN — GABAPENTIN 400 MG: 100 CAPSULE ORAL at 08:28

## 2022-01-01 RX ADMIN — INSULIN ASPART 2 UNITS: 100 INJECTION, SOLUTION INTRAVENOUS; SUBCUTANEOUS at 17:44

## 2022-01-01 RX ADMIN — BUSPIRONE HYDROCHLORIDE 7.5 MG: 15 TABLET ORAL at 20:06

## 2022-01-01 RX ADMIN — IPRATROPIUM BROMIDE AND ALBUTEROL 1 PUFF: 20; 100 SPRAY, METERED RESPIRATORY (INHALATION) at 16:52

## 2022-01-01 RX ADMIN — IPRATROPIUM BROMIDE AND ALBUTEROL 1 PUFF: 20; 100 SPRAY, METERED RESPIRATORY (INHALATION) at 12:04

## 2022-01-01 RX ADMIN — GABAPENTIN 400 MG: 100 CAPSULE ORAL at 08:32

## 2022-01-01 RX ADMIN — IPRATROPIUM BROMIDE AND ALBUTEROL 1 PUFF: 20; 100 SPRAY, METERED RESPIRATORY (INHALATION) at 20:20

## 2022-01-01 RX ADMIN — ACETAMINOPHEN 975 MG: 325 TABLET ORAL at 08:27

## 2022-01-01 RX ADMIN — INSULIN ASPART 1 UNITS: 100 INJECTION, SOLUTION INTRAVENOUS; SUBCUTANEOUS at 17:36

## 2022-01-01 RX ADMIN — DULOXETINE HYDROCHLORIDE 40 MG: 20 CAPSULE, DELAYED RELEASE ORAL at 08:06

## 2022-01-01 RX ADMIN — ATORVASTATIN CALCIUM 80 MG: 40 TABLET, FILM COATED ORAL at 22:20

## 2022-01-01 RX ADMIN — GABAPENTIN 400 MG: 100 CAPSULE ORAL at 20:38

## 2022-01-01 RX ADMIN — ACETAMINOPHEN 650 MG: 325 TABLET ORAL at 00:41

## 2022-01-01 RX ADMIN — UMECLIDINIUM 1 PUFF: 62.5 AEROSOL, POWDER ORAL at 09:06

## 2022-01-01 RX ADMIN — BUSPIRONE HYDROCHLORIDE 7.5 MG: 7.5 TABLET ORAL at 08:45

## 2022-01-01 RX ADMIN — INSULIN ASPART 2 UNITS: 100 INJECTION, SOLUTION INTRAVENOUS; SUBCUTANEOUS at 11:57

## 2022-01-01 RX ADMIN — OSELTAMIVIR PHOSPHATE 30 MG: 30 CAPSULE ORAL at 21:52

## 2022-01-01 RX ADMIN — FLUTICASONE FUROATE AND VILANTEROL TRIFENATATE 1 PUFF: 100; 25 POWDER RESPIRATORY (INHALATION) at 16:20

## 2022-01-01 RX ADMIN — PANTOPRAZOLE SODIUM 40 MG: 20 TABLET, DELAYED RELEASE ORAL at 09:12

## 2022-01-01 RX ADMIN — IPRATROPIUM BROMIDE AND ALBUTEROL SULFATE 3 ML: .5; 2.5 SOLUTION RESPIRATORY (INHALATION) at 15:20

## 2022-01-01 RX ADMIN — FLUTICASONE FUROATE AND VILANTEROL TRIFENATATE 1 PUFF: 100; 25 POWDER RESPIRATORY (INHALATION) at 08:59

## 2022-01-01 RX ADMIN — CARBIDOPA AND LEVODOPA 1 TABLET: 10; 100 TABLET ORAL at 08:28

## 2022-01-01 RX ADMIN — HYDROMORPHONE HYDROCHLORIDE 0.2 MG: 1 INJECTION, SOLUTION INTRAMUSCULAR; INTRAVENOUS; SUBCUTANEOUS at 00:10

## 2022-01-01 RX ADMIN — ATORVASTATIN CALCIUM 80 MG: 40 TABLET, FILM COATED ORAL at 21:19

## 2022-01-01 RX ADMIN — FLUTICASONE PROPIONATE 2 SPRAY: 50 SPRAY, METERED NASAL at 10:20

## 2022-01-01 RX ADMIN — PIPERACILLIN SODIUM AND TAZOBACTAM SODIUM 3.38 G: 3; .375 INJECTION, POWDER, LYOPHILIZED, FOR SOLUTION INTRAVENOUS at 02:15

## 2022-01-01 RX ADMIN — ACETAMINOPHEN 650 MG: 325 TABLET ORAL at 21:24

## 2022-01-01 RX ADMIN — IPRATROPIUM BROMIDE AND ALBUTEROL 1 PUFF: 20; 100 SPRAY, METERED RESPIRATORY (INHALATION) at 17:20

## 2022-01-01 RX ADMIN — FLUTICASONE PROPIONATE 2 SPRAY: 50 SPRAY, METERED NASAL at 10:13

## 2022-01-01 RX ADMIN — GABAPENTIN 400 MG: 100 CAPSULE ORAL at 21:52

## 2022-01-01 RX ADMIN — IPRATROPIUM BROMIDE AND ALBUTEROL 1 PUFF: 20; 100 SPRAY, METERED RESPIRATORY (INHALATION) at 17:37

## 2022-01-01 RX ADMIN — DULOXETINE HYDROCHLORIDE 40 MG: 20 CAPSULE, DELAYED RELEASE ORAL at 08:27

## 2022-01-01 RX ADMIN — POLYETHYLENE GLYCOL 3350 17 G: 17 POWDER, FOR SOLUTION ORAL at 09:00

## 2022-01-01 RX ADMIN — DULOXETINE HYDROCHLORIDE 40 MG: 20 CAPSULE, DELAYED RELEASE ORAL at 10:18

## 2022-01-01 RX ADMIN — INSULIN ASPART 2 UNITS: 100 INJECTION, SOLUTION INTRAVENOUS; SUBCUTANEOUS at 12:30

## 2022-01-01 RX ADMIN — GABAPENTIN 400 MG: 100 CAPSULE ORAL at 09:01

## 2022-01-01 RX ADMIN — BUSPIRONE HYDROCHLORIDE 7.5 MG: 15 TABLET ORAL at 20:28

## 2022-01-01 RX ADMIN — FLUTICASONE FUROATE AND VILANTEROL TRIFENATATE 1 PUFF: 100; 25 POWDER RESPIRATORY (INHALATION) at 08:31

## 2022-01-01 RX ADMIN — TORSEMIDE 60 MG: 20 TABLET ORAL at 10:25

## 2022-01-01 RX ADMIN — INSULIN ASPART 2 UNITS: 100 INJECTION, SOLUTION INTRAVENOUS; SUBCUTANEOUS at 17:26

## 2022-01-01 RX ADMIN — TAMSULOSIN HYDROCHLORIDE 0.4 MG: 0.4 CAPSULE ORAL at 22:33

## 2022-01-01 RX ADMIN — ATORVASTATIN CALCIUM 80 MG: 40 TABLET, FILM COATED ORAL at 21:29

## 2022-01-01 RX ADMIN — PIPERACILLIN SODIUM AND TAZOBACTAM SODIUM 3.38 G: 3; .375 INJECTION, POWDER, LYOPHILIZED, FOR SOLUTION INTRAVENOUS at 13:09

## 2022-01-01 RX ADMIN — GABAPENTIN 400 MG: 100 CAPSULE ORAL at 19:23

## 2022-01-01 RX ADMIN — IPRATROPIUM BROMIDE AND ALBUTEROL 1 PUFF: 20; 100 SPRAY, METERED RESPIRATORY (INHALATION) at 20:44

## 2022-01-01 RX ADMIN — ACETAMINOPHEN 650 MG: 325 TABLET ORAL at 20:27

## 2022-01-01 RX ADMIN — ALBUTEROL SULFATE 2.5 MG: 2.5 SOLUTION RESPIRATORY (INHALATION) at 10:08

## 2022-01-01 RX ADMIN — TAMSULOSIN HYDROCHLORIDE 0.4 MG: 0.4 CAPSULE ORAL at 22:20

## 2022-01-01 RX ADMIN — GABAPENTIN 400 MG: 100 CAPSULE ORAL at 08:39

## 2022-01-01 RX ADMIN — METHYLPREDNISOLONE SODIUM SUCCINATE 40 MG: 40 INJECTION, POWDER, FOR SOLUTION INTRAMUSCULAR; INTRAVENOUS at 21:29

## 2022-01-01 RX ADMIN — TORSEMIDE 20 MG: 20 TABLET ORAL at 16:25

## 2022-01-01 RX ADMIN — DOCUSATE SODIUM 50 MG AND SENNOSIDES 8.6 MG 2 TABLET: 8.6; 5 TABLET, FILM COATED ORAL at 21:01

## 2022-01-01 RX ADMIN — ACETAMINOPHEN 650 MG: 325 TABLET ORAL at 11:08

## 2022-01-01 RX ADMIN — CARVEDILOL 6.25 MG: 3.12 TABLET, FILM COATED ORAL at 09:08

## 2022-01-01 RX ADMIN — GABAPENTIN 400 MG: 100 CAPSULE ORAL at 21:03

## 2022-01-01 RX ADMIN — TORSEMIDE 40 MG: 20 TABLET ORAL at 16:45

## 2022-01-01 RX ADMIN — CARBIDOPA AND LEVODOPA 1 TABLET: 10; 100 TABLET ORAL at 08:53

## 2022-01-01 RX ADMIN — TAMSULOSIN HYDROCHLORIDE 0.4 MG: 0.4 CAPSULE ORAL at 21:01

## 2022-01-01 RX ADMIN — UMECLIDINIUM 1 PUFF: 62.5 AEROSOL, POWDER ORAL at 08:45

## 2022-01-01 RX ADMIN — INSULIN ASPART 4 UNITS: 100 INJECTION, SOLUTION INTRAVENOUS; SUBCUTANEOUS at 13:52

## 2022-01-01 RX ADMIN — IPRATROPIUM BROMIDE AND ALBUTEROL 1 PUFF: 20; 100 SPRAY, METERED RESPIRATORY (INHALATION) at 12:09

## 2022-01-01 RX ADMIN — CARVEDILOL 6.25 MG: 3.12 TABLET, FILM COATED ORAL at 17:21

## 2022-01-01 RX ADMIN — PIPERACILLIN SODIUM AND TAZOBACTAM SODIUM 3.38 G: 3; .375 INJECTION, POWDER, LYOPHILIZED, FOR SOLUTION INTRAVENOUS at 11:05

## 2022-01-01 RX ADMIN — FERROUS SULFATE TAB 325 MG (65 MG ELEMENTAL FE) 325 MG: 325 (65 FE) TAB at 08:28

## 2022-01-01 RX ADMIN — GABAPENTIN 400 MG: 100 CAPSULE ORAL at 20:28

## 2022-01-01 RX ADMIN — ATORVASTATIN CALCIUM 80 MG: 40 TABLET, FILM COATED ORAL at 21:04

## 2022-01-01 RX ADMIN — CARVEDILOL 6.25 MG: 3.12 TABLET, FILM COATED ORAL at 18:27

## 2022-01-01 RX ADMIN — IPRATROPIUM BROMIDE AND ALBUTEROL 1 PUFF: 20; 100 SPRAY, METERED RESPIRATORY (INHALATION) at 08:46

## 2022-01-01 RX ADMIN — PREDNISONE 40 MG: 20 TABLET ORAL at 08:51

## 2022-01-01 RX ADMIN — ACETAMINOPHEN 975 MG: 325 TABLET ORAL at 08:45

## 2022-01-01 RX ADMIN — RIVAROXABAN 15 MG: 15 TABLET, FILM COATED ORAL at 17:21

## 2022-01-01 RX ADMIN — PREDNISONE 40 MG: 20 TABLET ORAL at 10:19

## 2022-01-01 RX ADMIN — IPRATROPIUM BROMIDE AND ALBUTEROL 1 PUFF: 20; 100 SPRAY, METERED RESPIRATORY (INHALATION) at 21:26

## 2022-01-01 RX ADMIN — LORAZEPAM 0.5 MG: 0.5 TABLET ORAL at 21:28

## 2022-01-01 RX ADMIN — PIPERACILLIN SODIUM AND TAZOBACTAM SODIUM 3.38 G: 3; .375 INJECTION, POWDER, LYOPHILIZED, FOR SOLUTION INTRAVENOUS at 03:52

## 2022-01-01 RX ADMIN — SALINE NASAL SPRAY 1 SPRAY: 1.5 SOLUTION NASAL at 01:22

## 2022-01-01 RX ADMIN — BUSPIRONE HYDROCHLORIDE 7.5 MG: 7.5 TABLET ORAL at 21:19

## 2022-01-01 RX ADMIN — HYDROMORPHONE HYDROCHLORIDE 1 MG: 2 TABLET ORAL at 22:48

## 2022-01-01 RX ADMIN — OSELTAMIVIR PHOSPHATE 30 MG: 30 CAPSULE ORAL at 20:06

## 2022-01-01 RX ADMIN — CARBIDOPA AND LEVODOPA 1 TABLET: 10; 100 TABLET ORAL at 08:07

## 2022-01-01 RX ADMIN — POLYETHYLENE GLYCOL 3350 17 G: 17 POWDER, FOR SOLUTION ORAL at 08:45

## 2022-01-01 RX ADMIN — FUROSEMIDE 40 MG: 10 INJECTION, SOLUTION INTRAMUSCULAR; INTRAVENOUS at 23:53

## 2022-01-01 RX ADMIN — CARBIDOPA AND LEVODOPA 1 TABLET: 10; 100 TABLET ORAL at 10:21

## 2022-01-01 RX ADMIN — HYDROMORPHONE HYDROCHLORIDE 1 MG: 2 TABLET ORAL at 12:10

## 2022-01-01 RX ADMIN — IPRATROPIUM BROMIDE AND ALBUTEROL SULFATE 3 ML: .5; 2.5 SOLUTION RESPIRATORY (INHALATION) at 20:26

## 2022-01-01 RX ADMIN — CARVEDILOL 6.25 MG: 3.12 TABLET, FILM COATED ORAL at 08:32

## 2022-01-01 RX ADMIN — FERROUS SULFATE TAB 325 MG (65 MG ELEMENTAL FE) 325 MG: 325 (65 FE) TAB at 13:59

## 2022-01-01 RX ADMIN — DULOXETINE HYDROCHLORIDE 40 MG: 20 CAPSULE, DELAYED RELEASE ORAL at 08:39

## 2022-01-01 RX ADMIN — IPRATROPIUM BROMIDE AND ALBUTEROL 1 PUFF: 20; 100 SPRAY, METERED RESPIRATORY (INHALATION) at 08:10

## 2022-01-01 RX ADMIN — UMECLIDINIUM 1 PUFF: 62.5 AEROSOL, POWDER ORAL at 09:02

## 2022-01-01 RX ADMIN — IPRATROPIUM BROMIDE AND ALBUTEROL 1 PUFF: 20; 100 SPRAY, METERED RESPIRATORY (INHALATION) at 21:24

## 2022-01-01 RX ADMIN — CARBIDOPA AND LEVODOPA 1 TABLET: 10; 100 TABLET ORAL at 10:24

## 2022-01-01 RX ADMIN — TORSEMIDE 40 MG: 20 TABLET ORAL at 08:44

## 2022-01-01 RX ADMIN — IPRATROPIUM BROMIDE AND ALBUTEROL 1 PUFF: 20; 100 SPRAY, METERED RESPIRATORY (INHALATION) at 08:41

## 2022-01-01 RX ADMIN — DULOXETINE HYDROCHLORIDE 40 MG: 20 CAPSULE, DELAYED RELEASE ORAL at 10:24

## 2022-01-01 RX ADMIN — IPRATROPIUM BROMIDE AND ALBUTEROL SULFATE 3 ML: .5; 2.5 SOLUTION RESPIRATORY (INHALATION) at 13:20

## 2022-01-01 RX ADMIN — CARBIDOPA AND LEVODOPA 1 TABLET: 10; 100 TABLET ORAL at 20:06

## 2022-01-01 RX ADMIN — DOXYCYCLINE 100 MG: 100 INJECTION, POWDER, LYOPHILIZED, FOR SOLUTION INTRAVENOUS at 15:12

## 2022-01-01 RX ADMIN — FLUTICASONE FUROATE AND VILANTEROL TRIFENATATE 1 PUFF: 100; 25 POWDER RESPIRATORY (INHALATION) at 08:30

## 2022-01-01 RX ADMIN — PANTOPRAZOLE SODIUM 40 MG: 20 TABLET, DELAYED RELEASE ORAL at 08:38

## 2022-01-01 RX ADMIN — INSULIN ASPART 3 UNITS: 100 INJECTION, SOLUTION INTRAVENOUS; SUBCUTANEOUS at 12:04

## 2022-01-01 RX ADMIN — BUSPIRONE HYDROCHLORIDE 7.5 MG: 15 TABLET ORAL at 10:21

## 2022-01-01 RX ADMIN — INSULIN ASPART 2 UNITS: 100 INJECTION, SOLUTION INTRAVENOUS; SUBCUTANEOUS at 17:47

## 2022-01-01 RX ADMIN — PREDNISONE 40 MG: 20 TABLET ORAL at 08:44

## 2022-01-01 RX ADMIN — DOXYCYCLINE 100 MG: 100 INJECTION, POWDER, LYOPHILIZED, FOR SOLUTION INTRAVENOUS at 02:15

## 2022-01-01 RX ADMIN — PANTOPRAZOLE SODIUM 40 MG: 20 TABLET, DELAYED RELEASE ORAL at 08:45

## 2022-01-01 RX ADMIN — IPRATROPIUM BROMIDE AND ALBUTEROL 1 PUFF: 20; 100 SPRAY, METERED RESPIRATORY (INHALATION) at 11:16

## 2022-01-01 RX ADMIN — INSULIN ASPART 5 UNITS: 100 INJECTION, SOLUTION INTRAVENOUS; SUBCUTANEOUS at 18:25

## 2022-01-01 RX ADMIN — PIPERACILLIN SODIUM AND TAZOBACTAM SODIUM 3.38 G: 3; .375 INJECTION, POWDER, LYOPHILIZED, FOR SOLUTION INTRAVENOUS at 20:26

## 2022-01-01 RX ADMIN — PANTOPRAZOLE SODIUM 40 MG: 20 TABLET, DELAYED RELEASE ORAL at 10:22

## 2022-01-01 RX ADMIN — TORSEMIDE 60 MG: 20 TABLET ORAL at 08:52

## 2022-01-01 RX ADMIN — BUSPIRONE HYDROCHLORIDE 7.5 MG: 15 TABLET ORAL at 20:38

## 2022-01-01 RX ADMIN — BUSPIRONE HYDROCHLORIDE 7.5 MG: 7.5 TABLET ORAL at 22:20

## 2022-01-01 RX ADMIN — CARVEDILOL 6.25 MG: 3.12 TABLET, FILM COATED ORAL at 18:04

## 2022-01-01 RX ADMIN — CARBIDOPA AND LEVODOPA 1 TABLET: 10; 100 TABLET ORAL at 21:18

## 2022-01-01 RX ADMIN — FERROUS SULFATE TAB 325 MG (65 MG ELEMENTAL FE) 325 MG: 325 (65 FE) TAB at 10:21

## 2022-01-01 RX ADMIN — POLYETHYLENE GLYCOL 3350 17 G: 17 POWDER, FOR SOLUTION ORAL at 08:28

## 2022-01-01 RX ADMIN — CARBIDOPA AND LEVODOPA 1 TABLET: 10; 100 TABLET ORAL at 10:19

## 2022-01-01 RX ADMIN — GABAPENTIN 400 MG: 100 CAPSULE ORAL at 21:04

## 2022-01-01 RX ADMIN — UMECLIDINIUM 1 PUFF: 62.5 AEROSOL, POWDER ORAL at 08:10

## 2022-01-01 RX ADMIN — BUSPIRONE HYDROCHLORIDE 7.5 MG: 15 TABLET ORAL at 08:51

## 2022-01-01 RX ADMIN — CARBIDOPA AND LEVODOPA 1 TABLET: 10; 100 TABLET ORAL at 21:52

## 2022-01-01 RX ADMIN — DULOXETINE HYDROCHLORIDE 40 MG: 20 CAPSULE, DELAYED RELEASE ORAL at 08:49

## 2022-01-01 RX ADMIN — TAMSULOSIN HYDROCHLORIDE 0.4 MG: 0.4 CAPSULE ORAL at 21:19

## 2022-01-01 RX ADMIN — FUROSEMIDE 40 MG: 10 INJECTION, SOLUTION INTRAMUSCULAR; INTRAVENOUS at 12:30

## 2022-01-01 RX ADMIN — Medication 1 MG: at 21:28

## 2022-01-01 RX ADMIN — DULOXETINE HYDROCHLORIDE 40 MG: 20 CAPSULE, DELAYED RELEASE ORAL at 10:22

## 2022-01-01 RX ADMIN — OSELTAMIVIR PHOSPHATE 30 MG: 30 CAPSULE ORAL at 10:19

## 2022-01-01 RX ADMIN — HYDROMORPHONE HYDROCHLORIDE 0.2 MG: 1 INJECTION, SOLUTION INTRAMUSCULAR; INTRAVENOUS; SUBCUTANEOUS at 14:04

## 2022-01-01 RX ADMIN — PANTOPRAZOLE SODIUM 40 MG: 20 TABLET, DELAYED RELEASE ORAL at 08:28

## 2022-01-01 RX ADMIN — DOXYCYCLINE 100 MG: 100 INJECTION, POWDER, LYOPHILIZED, FOR SOLUTION INTRAVENOUS at 08:30

## 2022-01-01 RX ADMIN — CARBIDOPA AND LEVODOPA 1 TABLET: 10; 100 TABLET ORAL at 20:28

## 2022-01-01 RX ADMIN — RIVAROXABAN 15 MG: 15 TABLET, FILM COATED ORAL at 17:26

## 2022-01-01 RX ADMIN — ALBUTEROL SULFATE 1 PUFF: 90 AEROSOL, METERED RESPIRATORY (INHALATION) at 09:03

## 2022-01-01 RX ADMIN — ACETAMINOPHEN 975 MG: 325 TABLET ORAL at 14:57

## 2022-01-01 RX ADMIN — TAMSULOSIN HYDROCHLORIDE 0.4 MG: 0.4 CAPSULE ORAL at 21:29

## 2022-01-01 RX ADMIN — FLUTICASONE FUROATE AND VILANTEROL TRIFENATATE 1 PUFF: 100; 25 POWDER RESPIRATORY (INHALATION) at 08:15

## 2022-01-01 RX ADMIN — DULOXETINE HYDROCHLORIDE 40 MG: 20 CAPSULE, DELAYED RELEASE ORAL at 09:00

## 2022-01-01 RX ADMIN — FLUTICASONE FUROATE AND VILANTEROL TRIFENATATE 1 PUFF: 100; 25 POWDER RESPIRATORY (INHALATION) at 08:54

## 2022-01-01 RX ADMIN — HYDROMORPHONE HYDROCHLORIDE 1 MG: 2 TABLET ORAL at 18:15

## 2022-01-01 RX ADMIN — OSELTAMIVIR PHOSPHATE 30 MG: 30 CAPSULE ORAL at 11:32

## 2022-01-01 RX ADMIN — OSELTAMIVIR PHOSPHATE 30 MG: 30 CAPSULE ORAL at 08:51

## 2022-01-01 RX ADMIN — IPRATROPIUM BROMIDE AND ALBUTEROL 1 PUFF: 20; 100 SPRAY, METERED RESPIRATORY (INHALATION) at 17:26

## 2022-01-01 RX ADMIN — ALBUTEROL SULFATE 2 PUFF: 90 AEROSOL, METERED RESPIRATORY (INHALATION) at 09:06

## 2022-01-01 RX ADMIN — ACETAMINOPHEN 650 MG: 325 TABLET ORAL at 20:38

## 2022-01-01 RX ADMIN — IPRATROPIUM BROMIDE AND ALBUTEROL 1 PUFF: 20; 100 SPRAY, METERED RESPIRATORY (INHALATION) at 08:12

## 2022-01-01 RX ADMIN — IPRATROPIUM BROMIDE AND ALBUTEROL SULFATE 3 ML: .5; 2.5 SOLUTION RESPIRATORY (INHALATION) at 19:28

## 2022-01-01 RX ADMIN — CARVEDILOL 6.25 MG: 3.12 TABLET, FILM COATED ORAL at 08:44

## 2022-01-01 RX ADMIN — ALBUTEROL SULFATE 2 PUFF: 90 AEROSOL, METERED RESPIRATORY (INHALATION) at 22:09

## 2022-01-01 RX ADMIN — INSULIN ASPART 5 UNITS: 100 INJECTION, SOLUTION INTRAVENOUS; SUBCUTANEOUS at 17:20

## 2022-01-01 RX ADMIN — FLUTICASONE PROPIONATE 2 SPRAY: 50 SPRAY, METERED NASAL at 08:10

## 2022-01-01 RX ADMIN — IPRATROPIUM BROMIDE AND ALBUTEROL 1 PUFF: 20; 100 SPRAY, METERED RESPIRATORY (INHALATION) at 11:57

## 2022-01-01 RX ADMIN — PANTOPRAZOLE SODIUM 40 MG: 20 TABLET, DELAYED RELEASE ORAL at 08:51

## 2022-01-01 RX ADMIN — PERFLUTREN 3 ML: 6.52 INJECTION, SUSPENSION INTRAVENOUS at 07:35

## 2022-01-01 RX ADMIN — IPRATROPIUM BROMIDE AND ALBUTEROL 1 PUFF: 20; 100 SPRAY, METERED RESPIRATORY (INHALATION) at 10:21

## 2022-01-01 RX ADMIN — ATORVASTATIN CALCIUM 80 MG: 40 TABLET, FILM COATED ORAL at 21:03

## 2022-01-01 RX ADMIN — TORSEMIDE 40 MG: 20 TABLET ORAL at 08:27

## 2022-01-01 RX ADMIN — CARVEDILOL 6.25 MG: 3.12 TABLET, FILM COATED ORAL at 17:26

## 2022-01-01 RX ADMIN — RIVAROXABAN 15 MG: 15 TABLET, FILM COATED ORAL at 17:36

## 2022-01-01 RX ADMIN — BUSPIRONE HYDROCHLORIDE 7.5 MG: 15 TABLET ORAL at 21:18

## 2022-01-01 RX ADMIN — LORAZEPAM 0.5 MG: 0.5 TABLET ORAL at 01:22

## 2022-01-01 RX ADMIN — TAMSULOSIN HYDROCHLORIDE 0.4 MG: 0.4 CAPSULE ORAL at 21:52

## 2022-01-01 RX ADMIN — IPRATROPIUM BROMIDE AND ALBUTEROL 1 PUFF: 20; 100 SPRAY, METERED RESPIRATORY (INHALATION) at 17:47

## 2022-01-01 RX ADMIN — GABAPENTIN 400 MG: 100 CAPSULE ORAL at 10:19

## 2022-01-01 RX ADMIN — FUROSEMIDE 10 MG: 10 INJECTION, SOLUTION INTRAMUSCULAR; INTRAVENOUS at 13:06

## 2022-01-01 RX ADMIN — BUSPIRONE HYDROCHLORIDE 7.5 MG: 15 TABLET ORAL at 21:02

## 2022-01-01 RX ADMIN — IPRATROPIUM BROMIDE AND ALBUTEROL 1 PUFF: 20; 100 SPRAY, METERED RESPIRATORY (INHALATION) at 12:06

## 2022-01-01 RX ADMIN — IPRATROPIUM BROMIDE AND ALBUTEROL 1 PUFF: 20; 100 SPRAY, METERED RESPIRATORY (INHALATION) at 08:31

## 2022-01-01 RX ADMIN — FERROUS SULFATE TAB 325 MG (65 MG ELEMENTAL FE) 325 MG: 325 (65 FE) TAB at 08:33

## 2022-01-01 RX ADMIN — HYDROMORPHONE HYDROCHLORIDE 1 MG: 2 TABLET ORAL at 08:44

## 2022-01-01 RX ADMIN — ALBUTEROL SULFATE 2.5 MG: 2.5 SOLUTION RESPIRATORY (INHALATION) at 10:13

## 2022-01-01 RX ADMIN — FERROUS SULFATE TAB 325 MG (65 MG ELEMENTAL FE) 325 MG: 325 (65 FE) TAB at 08:06

## 2022-01-01 RX ADMIN — CARBIDOPA AND LEVODOPA 1 TABLET: 10; 100 TABLET ORAL at 09:00

## 2022-01-01 RX ADMIN — CARBIDOPA AND LEVODOPA 1 TABLET: 10; 100 TABLET ORAL at 21:28

## 2022-01-01 RX ADMIN — TORSEMIDE 20 MG: 20 TABLET ORAL at 13:40

## 2022-01-01 RX ADMIN — IPRATROPIUM BROMIDE AND ALBUTEROL SULFATE 3 ML: .5; 2.5 SOLUTION RESPIRATORY (INHALATION) at 15:09

## 2022-01-01 RX ADMIN — BUSPIRONE HYDROCHLORIDE 7.5 MG: 7.5 TABLET ORAL at 09:11

## 2022-01-01 RX ADMIN — IPRATROPIUM BROMIDE AND ALBUTEROL 1 PUFF: 20; 100 SPRAY, METERED RESPIRATORY (INHALATION) at 16:08

## 2022-01-01 RX ADMIN — GABAPENTIN 400 MG: 100 CAPSULE ORAL at 09:09

## 2022-01-01 RX ADMIN — BUSPIRONE HYDROCHLORIDE 7.5 MG: 7.5 TABLET ORAL at 08:28

## 2022-01-01 RX ADMIN — GABAPENTIN 400 MG: 100 CAPSULE ORAL at 20:06

## 2022-01-01 RX ADMIN — IPRATROPIUM BROMIDE AND ALBUTEROL 1 PUFF: 20; 100 SPRAY, METERED RESPIRATORY (INHALATION) at 09:07

## 2022-01-01 RX ADMIN — DOXYCYCLINE 100 MG: 100 INJECTION, POWDER, LYOPHILIZED, FOR SOLUTION INTRAVENOUS at 17:16

## 2022-01-01 RX ADMIN — CARVEDILOL 6.25 MG: 3.12 TABLET, FILM COATED ORAL at 18:55

## 2022-01-01 RX ADMIN — FLUTICASONE PROPIONATE 2 SPRAY: 50 SPRAY, METERED NASAL at 09:06

## 2022-01-01 RX ADMIN — TORSEMIDE 60 MG: 20 TABLET ORAL at 10:21

## 2022-01-01 RX ADMIN — HYDROMORPHONE HYDROCHLORIDE 1 MG: 2 TABLET ORAL at 21:34

## 2022-01-01 RX ADMIN — GABAPENTIN 400 MG: 100 CAPSULE ORAL at 20:20

## 2022-01-01 RX ADMIN — TORSEMIDE 40 MG: 20 TABLET ORAL at 09:09

## 2022-01-01 RX ADMIN — UMECLIDINIUM 1 PUFF: 62.5 AEROSOL, POWDER ORAL at 08:15

## 2022-01-01 RX ADMIN — CARBIDOPA AND LEVODOPA 1 TABLET: 10; 100 TABLET ORAL at 08:33

## 2022-01-01 RX ADMIN — FERROUS SULFATE TAB 325 MG (65 MG ELEMENTAL FE) 325 MG: 325 (65 FE) TAB at 08:44

## 2022-01-01 RX ADMIN — FLUTICASONE PROPIONATE 2 SPRAY: 50 SPRAY, METERED NASAL at 08:31

## 2022-01-01 RX ADMIN — ALBUTEROL SULFATE 2 PUFF: 90 AEROSOL, METERED RESPIRATORY (INHALATION) at 08:28

## 2022-01-01 RX ADMIN — FLUTICASONE PROPIONATE 2 SPRAY: 50 SPRAY, METERED NASAL at 16:21

## 2022-01-01 RX ADMIN — CARBIDOPA AND LEVODOPA 1 TABLET: 10; 100 TABLET ORAL at 08:38

## 2022-01-01 RX ADMIN — ACETAMINOPHEN 975 MG: 325 TABLET ORAL at 21:19

## 2022-01-01 RX ADMIN — LORAZEPAM 0.5 MG: 0.5 TABLET ORAL at 03:48

## 2022-01-01 RX ADMIN — PANTOPRAZOLE SODIUM 40 MG: 20 TABLET, DELAYED RELEASE ORAL at 10:25

## 2022-01-01 RX ADMIN — OSELTAMIVIR PHOSPHATE 30 MG: 30 CAPSULE ORAL at 12:30

## 2022-01-01 RX ADMIN — RIVAROXABAN 15 MG: 15 TABLET, FILM COATED ORAL at 16:24

## 2022-01-01 RX ADMIN — SODIUM CHLORIDE: 9 INJECTION, SOLUTION INTRAVENOUS at 16:28

## 2022-01-01 RX ADMIN — POLYETHYLENE GLYCOL 3350 17 G: 17 POWDER, FOR SOLUTION ORAL at 09:10

## 2022-01-01 RX ADMIN — INSULIN ASPART 2 UNITS: 100 INJECTION, SOLUTION INTRAVENOUS; SUBCUTANEOUS at 12:58

## 2022-01-01 RX ADMIN — OSELTAMIVIR PHOSPHATE 30 MG: 30 CAPSULE ORAL at 20:27

## 2022-01-01 RX ADMIN — CARBIDOPA AND LEVODOPA 1 TABLET: 10; 100 TABLET ORAL at 20:19

## 2022-01-01 RX ADMIN — FLUTICASONE FUROATE AND VILANTEROL TRIFENATATE 1 PUFF: 100; 25 POWDER RESPIRATORY (INHALATION) at 10:15

## 2022-01-01 RX ADMIN — DULOXETINE HYDROCHLORIDE 40 MG: 20 CAPSULE, DELAYED RELEASE ORAL at 09:09

## 2022-01-01 RX ADMIN — BUSPIRONE HYDROCHLORIDE 7.5 MG: 15 TABLET ORAL at 10:18

## 2022-01-01 RX ADMIN — IPRATROPIUM BROMIDE AND ALBUTEROL 1 PUFF: 20; 100 SPRAY, METERED RESPIRATORY (INHALATION) at 19:24

## 2022-01-01 RX ADMIN — GABAPENTIN 400 MG: 100 CAPSULE ORAL at 20:19

## 2022-01-01 RX ADMIN — BUSPIRONE HYDROCHLORIDE 7.5 MG: 15 TABLET ORAL at 21:52

## 2022-01-01 RX ADMIN — DULOXETINE HYDROCHLORIDE 40 MG: 20 CAPSULE, DELAYED RELEASE ORAL at 08:43

## 2022-01-01 RX ADMIN — BUSPIRONE HYDROCHLORIDE 7.5 MG: 15 TABLET ORAL at 21:12

## 2022-01-01 RX ADMIN — METHYLPREDNISOLONE SODIUM SUCCINATE 40 MG: 40 INJECTION, POWDER, FOR SOLUTION INTRAMUSCULAR; INTRAVENOUS at 22:56

## 2022-01-01 RX ADMIN — ALBUTEROL SULFATE 2.5 MG: 2.5 SOLUTION RESPIRATORY (INHALATION) at 10:09

## 2022-01-01 RX ADMIN — ATORVASTATIN CALCIUM 80 MG: 40 TABLET, FILM COATED ORAL at 22:33

## 2022-01-01 RX ADMIN — FLUTICASONE PROPIONATE 2 SPRAY: 50 SPRAY, METERED NASAL at 08:29

## 2022-01-01 RX ADMIN — FLUTICASONE PROPIONATE 2 SPRAY: 50 SPRAY, METERED NASAL at 08:45

## 2022-01-01 RX ADMIN — GABAPENTIN 400 MG: 100 CAPSULE ORAL at 21:28

## 2022-01-01 RX ADMIN — PANTOPRAZOLE SODIUM 40 MG: 20 TABLET, DELAYED RELEASE ORAL at 15:12

## 2022-01-01 RX ADMIN — BUSPIRONE HYDROCHLORIDE 7.5 MG: 7.5 TABLET ORAL at 09:00

## 2022-01-01 RX ADMIN — ACETAMINOPHEN 975 MG: 325 TABLET ORAL at 22:20

## 2022-01-01 RX ADMIN — CARVEDILOL 6.25 MG: 3.12 TABLET, FILM COATED ORAL at 08:49

## 2022-01-01 RX ADMIN — ALBUTEROL SULFATE 1 PUFF: 90 AEROSOL, METERED RESPIRATORY (INHALATION) at 16:50

## 2022-01-01 RX ADMIN — IPRATROPIUM BROMIDE AND ALBUTEROL 1 PUFF: 20; 100 SPRAY, METERED RESPIRATORY (INHALATION) at 21:51

## 2022-01-01 RX ADMIN — GABAPENTIN 400 MG: 100 CAPSULE ORAL at 08:49

## 2022-01-01 RX ADMIN — ALBUTEROL SULFATE 2.5 MG: 2.5 SOLUTION RESPIRATORY (INHALATION) at 03:55

## 2022-01-01 RX ADMIN — HYDROMORPHONE HYDROCHLORIDE 1 MG: 2 TABLET ORAL at 22:15

## 2022-01-01 RX ADMIN — HYDROMORPHONE HYDROCHLORIDE 1 MG: 2 TABLET ORAL at 10:57

## 2022-01-01 RX ADMIN — FERROUS SULFATE TAB 325 MG (65 MG ELEMENTAL FE) 325 MG: 325 (65 FE) TAB at 09:10

## 2022-01-01 RX ADMIN — ONDANSETRON 4 MG: 4 TABLET, ORALLY DISINTEGRATING ORAL at 14:04

## 2022-01-01 RX ADMIN — PANTOPRAZOLE SODIUM 40 MG: 20 TABLET, DELAYED RELEASE ORAL at 10:19

## 2022-01-01 RX ADMIN — BUSPIRONE HYDROCHLORIDE 7.5 MG: 15 TABLET ORAL at 20:20

## 2022-01-01 RX ADMIN — CARVEDILOL 6.25 MG: 3.12 TABLET, FILM COATED ORAL at 08:06

## 2022-01-01 RX ADMIN — PANTOPRAZOLE SODIUM 40 MG: 20 TABLET, DELAYED RELEASE ORAL at 09:01

## 2022-01-01 RX ADMIN — LORAZEPAM 0.5 MG: 0.5 TABLET ORAL at 21:24

## 2022-01-01 RX ADMIN — GABAPENTIN 400 MG: 100 CAPSULE ORAL at 10:24

## 2022-01-01 RX ADMIN — UMECLIDINIUM 1 PUFF: 62.5 AEROSOL, POWDER ORAL at 10:14

## 2022-01-01 RX ADMIN — INSULIN ASPART 3 UNITS: 100 INJECTION, SOLUTION INTRAVENOUS; SUBCUTANEOUS at 18:06

## 2022-01-01 RX ADMIN — CARBIDOPA AND LEVODOPA 1 TABLET: 10; 100 TABLET ORAL at 20:39

## 2022-01-01 RX ADMIN — CARVEDILOL 6.25 MG: 3.12 TABLET, FILM COATED ORAL at 10:22

## 2022-01-01 RX ADMIN — ACETAMINOPHEN 975 MG: 325 TABLET ORAL at 09:10

## 2022-01-01 RX ADMIN — IPRATROPIUM BROMIDE AND ALBUTEROL 1 PUFF: 20; 100 SPRAY, METERED RESPIRATORY (INHALATION) at 20:05

## 2022-01-01 RX ADMIN — HYDROMORPHONE HYDROCHLORIDE 1 MG: 2 TABLET ORAL at 16:50

## 2022-01-01 RX ADMIN — ACETAMINOPHEN 975 MG: 325 TABLET ORAL at 21:27

## 2022-01-01 RX ADMIN — CARVEDILOL 6.25 MG: 3.12 TABLET, FILM COATED ORAL at 18:16

## 2022-01-01 RX ADMIN — TAMSULOSIN HYDROCHLORIDE 0.4 MG: 0.4 CAPSULE ORAL at 21:03

## 2022-01-01 RX ADMIN — IPRATROPIUM BROMIDE AND ALBUTEROL 1 PUFF: 20; 100 SPRAY, METERED RESPIRATORY (INHALATION) at 12:28

## 2022-01-01 RX ADMIN — PANTOPRAZOLE SODIUM 40 MG: 20 TABLET, DELAYED RELEASE ORAL at 08:33

## 2022-01-01 RX ADMIN — CARBIDOPA AND LEVODOPA 1 TABLET: 10; 100 TABLET ORAL at 09:10

## 2022-01-01 RX ADMIN — ALBUTEROL SULFATE 3 PUFF: 90 AEROSOL, METERED RESPIRATORY (INHALATION) at 07:03

## 2022-01-01 RX ADMIN — FUROSEMIDE 20 MG: 10 INJECTION, SOLUTION INTRAMUSCULAR; INTRAVENOUS at 10:21

## 2022-01-01 RX ADMIN — GABAPENTIN 400 MG: 100 CAPSULE ORAL at 10:22

## 2022-01-01 RX ADMIN — INSULIN ASPART 4 UNITS: 100 INJECTION, SOLUTION INTRAVENOUS; SUBCUTANEOUS at 17:29

## 2022-01-01 RX ADMIN — ACETAMINOPHEN 650 MG: 325 TABLET ORAL at 10:57

## 2022-01-01 RX ADMIN — OSELTAMIVIR PHOSPHATE 30 MG: 30 CAPSULE ORAL at 21:02

## 2022-01-01 RX ADMIN — IPRATROPIUM BROMIDE AND ALBUTEROL SULFATE 3 ML: .5; 2.5 SOLUTION RESPIRATORY (INHALATION) at 12:08

## 2022-01-01 RX ADMIN — GABAPENTIN 400 MG: 100 CAPSULE ORAL at 08:06

## 2022-01-01 RX ADMIN — TAMSULOSIN HYDROCHLORIDE 0.4 MG: 0.4 CAPSULE ORAL at 21:04

## 2022-01-01 RX ADMIN — TAMSULOSIN HYDROCHLORIDE 0.4 MG: 0.4 CAPSULE ORAL at 21:18

## 2022-01-01 ASSESSMENT — ACTIVITIES OF DAILY LIVING (ADL)
ADLS_ACUITY_SCORE: 17
ADLS_ACUITY_SCORE: 15
ADLS_ACUITY_SCORE: 9
ADLS_ACUITY_SCORE: 7
ADLS_ACUITY_SCORE: 11
ADLS_ACUITY_SCORE: 15
ADLS_ACUITY_SCORE: 9
ADLS_ACUITY_SCORE: 11
ADLS_ACUITY_SCORE: 11
ADLS_ACUITY_SCORE: 15
ADLS_ACUITY_SCORE: 17
ADLS_ACUITY_SCORE: 9
ADLS_ACUITY_SCORE: 8
ADLS_ACUITY_SCORE: 8
DIFFICULTY_COMMUNICATING: NO
ADLS_ACUITY_SCORE: 15
ADLS_ACUITY_SCORE: 15
ADLS_ACUITY_SCORE: 12
ADLS_ACUITY_SCORE: 11
ADLS_ACUITY_SCORE: 14
ADLS_ACUITY_SCORE: 15
ADLS_ACUITY_SCORE: 15
DRESSING/BATHING: BATHING DIFFICULTY, REQUIRES EQUIPMENT
DIFFICULTY_EATING/SWALLOWING: NO
ADLS_ACUITY_SCORE: 17
ADLS_ACUITY_SCORE: 15
ADLS_ACUITY_SCORE: 13
ADLS_ACUITY_SCORE: 11
ADLS_ACUITY_SCORE: 13
ADLS_ACUITY_SCORE: 12
ADLS_ACUITY_SCORE: 15
ADLS_ACUITY_SCORE: 11
ADLS_ACUITY_SCORE: 15
ADLS_ACUITY_SCORE: 9
ADLS_ACUITY_SCORE: 9
ADLS_ACUITY_SCORE: 11
ADLS_ACUITY_SCORE: 9
WEAR_GLASSES_OR_BLIND: NO
ADLS_ACUITY_SCORE: 11
ADLS_ACUITY_SCORE: 15
ADLS_ACUITY_SCORE: 15
ADLS_ACUITY_SCORE: 9
ADLS_ACUITY_SCORE: 12
ADLS_ACUITY_SCORE: 17
ADLS_ACUITY_SCORE: 7
ADLS_ACUITY_SCORE: 15
ADLS_ACUITY_SCORE: 9
ADLS_ACUITY_SCORE: 11
HEARING_DIFFICULTY_OR_DEAF: NO
ADLS_ACUITY_SCORE: 9
ADLS_ACUITY_SCORE: 11
ADLS_ACUITY_SCORE: 17
ADLS_ACUITY_SCORE: 11
ADLS_ACUITY_SCORE: 13
ADLS_ACUITY_SCORE: 15
ADLS_ACUITY_SCORE: 15
ADLS_ACUITY_SCORE: 11
ADLS_ACUITY_SCORE: 8
ADLS_ACUITY_SCORE: 9
ADLS_ACUITY_SCORE: 15
ADLS_ACUITY_SCORE: 14
ADLS_ACUITY_SCORE: 11
ADLS_ACUITY_SCORE: 13
ADLS_ACUITY_SCORE: 13
ADLS_ACUITY_SCORE: 15
ADLS_ACUITY_SCORE: 15
ADLS_ACUITY_SCORE: 11
ADLS_ACUITY_SCORE: 11
ADLS_ACUITY_SCORE: 9
ADLS_ACUITY_SCORE: 15
ADLS_ACUITY_SCORE: 11
ADLS_ACUITY_SCORE: 11
ADLS_ACUITY_SCORE: 15
ADLS_ACUITY_SCORE: 12
ADLS_ACUITY_SCORE: 9
ADLS_ACUITY_SCORE: 15
ADLS_ACUITY_SCORE: 8
ADLS_ACUITY_SCORE: 9
ADLS_ACUITY_SCORE: 13
CONCENTRATING,_REMEMBERING_OR_MAKING_DECISIONS_DIFFICULTY: NO
ADLS_ACUITY_SCORE: 8
ADLS_ACUITY_SCORE: 17
ADLS_ACUITY_SCORE: 14
ADLS_ACUITY_SCORE: 11
ADLS_ACUITY_SCORE: 15
ADLS_ACUITY_SCORE: 7
ADLS_ACUITY_SCORE: 15
ADLS_ACUITY_SCORE: 13
ADLS_ACUITY_SCORE: 11
ADLS_ACUITY_SCORE: 13
ADLS_ACUITY_SCORE: 11
FALL_HISTORY_WITHIN_LAST_SIX_MONTHS: NO
ADLS_ACUITY_SCORE: 15
TOILETING_ISSUES: NO
ADLS_ACUITY_SCORE: 11
ADLS_ACUITY_SCORE: 14
ADLS_ACUITY_SCORE: 9
ADLS_ACUITY_SCORE: 15
ADLS_ACUITY_SCORE: 15
ADLS_ACUITY_SCORE: 11
ADLS_ACUITY_SCORE: 9
ADLS_ACUITY_SCORE: 9
ADLS_ACUITY_SCORE: 17
ADLS_ACUITY_SCORE: 15
ADLS_ACUITY_SCORE: 14
ADLS_ACUITY_SCORE: 15
ADLS_ACUITY_SCORE: 15
ADLS_ACUITY_SCORE: 12
ADLS_ACUITY_SCORE: 11
ADLS_ACUITY_SCORE: 13
ADLS_ACUITY_SCORE: 11
ADLS_ACUITY_SCORE: 15
ADLS_ACUITY_SCORE: 11
ADLS_ACUITY_SCORE: 14
ADLS_ACUITY_SCORE: 8
ADLS_ACUITY_SCORE: 15
ADLS_ACUITY_SCORE: 17
ADLS_ACUITY_SCORE: 9
ADLS_ACUITY_SCORE: 14
ADLS_ACUITY_SCORE: 8
ADLS_ACUITY_SCORE: 11
ADLS_ACUITY_SCORE: 17
ADLS_ACUITY_SCORE: 15
ADLS_ACUITY_SCORE: 11
ADLS_ACUITY_SCORE: 15
ADLS_ACUITY_SCORE: 17
ADLS_ACUITY_SCORE: 17
ADLS_ACUITY_SCORE: 15
WALKING_OR_CLIMBING_STAIRS: AMBULATION DIFFICULTY, REQUIRES EQUIPMENT
ADLS_ACUITY_SCORE: 15
ADLS_ACUITY_SCORE: 13
ADLS_ACUITY_SCORE: 8
ADLS_ACUITY_SCORE: 15
ADLS_ACUITY_SCORE: 8
ADLS_ACUITY_SCORE: 11
ADLS_ACUITY_SCORE: 11
ADLS_ACUITY_SCORE: 12
ADLS_ACUITY_SCORE: 15
ADLS_ACUITY_SCORE: 9
ADLS_ACUITY_SCORE: 15
ADLS_ACUITY_SCORE: 14
ADLS_ACUITY_SCORE: 15
ADLS_ACUITY_SCORE: 11
ADLS_ACUITY_SCORE: 8
ADLS_ACUITY_SCORE: 11
ADLS_ACUITY_SCORE: 15
ADLS_ACUITY_SCORE: 12
ADLS_ACUITY_SCORE: 13
ADLS_ACUITY_SCORE: 11
ADLS_ACUITY_SCORE: 15
ADLS_ACUITY_SCORE: 15
DRESSING/BATHING_DIFFICULTY: YES
ADLS_ACUITY_SCORE: 11
ADLS_ACUITY_SCORE: 15
ADLS_ACUITY_SCORE: 13
ADLS_ACUITY_SCORE: 17
ADLS_ACUITY_SCORE: 15
ADLS_ACUITY_SCORE: 15
ADLS_ACUITY_SCORE: 18
ADLS_ACUITY_SCORE: 9
ADLS_ACUITY_SCORE: 17
ADLS_ACUITY_SCORE: 17
ADLS_ACUITY_SCORE: 15
ADLS_ACUITY_SCORE: 8
ADLS_ACUITY_SCORE: 11
ADLS_ACUITY_SCORE: 15
ADLS_ACUITY_SCORE: 15
ADLS_ACUITY_SCORE: 13
ADLS_ACUITY_SCORE: 11
ADLS_ACUITY_SCORE: 11
ADLS_ACUITY_SCORE: 15
ADLS_ACUITY_SCORE: 15
ADLS_ACUITY_SCORE: 9
ADLS_ACUITY_SCORE: 8
ADLS_ACUITY_SCORE: 11
ADLS_ACUITY_SCORE: 13
ADLS_ACUITY_SCORE: 9
ADLS_ACUITY_SCORE: 15
ADLS_ACUITY_SCORE: 12
ADLS_ACUITY_SCORE: 11
ADLS_ACUITY_SCORE: 11
ADLS_ACUITY_SCORE: 17
ADLS_ACUITY_SCORE: 8
ADLS_ACUITY_SCORE: 11
ADLS_ACUITY_SCORE: 8
ADLS_ACUITY_SCORE: 17
ADLS_ACUITY_SCORE: 11
ADLS_ACUITY_SCORE: 13
ADLS_ACUITY_SCORE: 9
ADLS_ACUITY_SCORE: 15
ADLS_ACUITY_SCORE: 15
ADLS_ACUITY_SCORE: 9
ADLS_ACUITY_SCORE: 17
ADLS_ACUITY_SCORE: 13
ADLS_ACUITY_SCORE: 13
ADLS_ACUITY_SCORE: 15
ADLS_ACUITY_SCORE: 13
ADLS_ACUITY_SCORE: 13
ADLS_ACUITY_SCORE: 8
ADLS_ACUITY_SCORE: 11
EQUIPMENT_CURRENTLY_USED_AT_HOME: WALKER, ROLLING
ADLS_ACUITY_SCORE: 14
ADLS_ACUITY_SCORE: 15
ADLS_ACUITY_SCORE: 8
ADLS_ACUITY_SCORE: 11
WALKING_OR_CLIMBING_STAIRS_DIFFICULTY: YES
ADLS_ACUITY_SCORE: 12
ADLS_ACUITY_SCORE: 15
ADLS_ACUITY_SCORE: 15
ADLS_ACUITY_SCORE: 17
ADLS_ACUITY_SCORE: 13
ADLS_ACUITY_SCORE: 15
ADLS_ACUITY_SCORE: 7
DEPENDENT_IADLS:: CLEANING;COOKING;LAUNDRY
ADLS_ACUITY_SCORE: 11
ADLS_ACUITY_SCORE: 8
ADLS_ACUITY_SCORE: 17
ADLS_ACUITY_SCORE: 15
ADLS_ACUITY_SCORE: 11
ADLS_ACUITY_SCORE: 15
ADLS_ACUITY_SCORE: 15
ADLS_ACUITY_SCORE: 12
ADLS_ACUITY_SCORE: 14
ADLS_ACUITY_SCORE: 14
ADLS_ACUITY_SCORE: 13
ADLS_ACUITY_SCORE: 15
DOING_ERRANDS_INDEPENDENTLY_DIFFICULTY: NO
ADLS_ACUITY_SCORE: 9
ADLS_ACUITY_SCORE: 8
ADLS_ACUITY_SCORE: 15
ADLS_ACUITY_SCORE: 8
ADLS_ACUITY_SCORE: 11
ADLS_ACUITY_SCORE: 15
ADLS_ACUITY_SCORE: 17
ADLS_ACUITY_SCORE: 12
ADLS_ACUITY_SCORE: 12
ADLS_ACUITY_SCORE: 15
ADLS_ACUITY_SCORE: 13
ADLS_ACUITY_SCORE: 7
ADLS_ACUITY_SCORE: 15
ADLS_ACUITY_SCORE: 11
ADLS_ACUITY_SCORE: 13
ADLS_ACUITY_SCORE: 15
ADLS_ACUITY_SCORE: 7
ADLS_ACUITY_SCORE: 15
ADLS_ACUITY_SCORE: 13
ADLS_ACUITY_SCORE: 8
ADLS_ACUITY_SCORE: 13
ADLS_ACUITY_SCORE: 11
ADLS_ACUITY_SCORE: 15
ADLS_ACUITY_SCORE: 11
ADLS_ACUITY_SCORE: 17
ADLS_ACUITY_SCORE: 15
ADLS_ACUITY_SCORE: 15
ADLS_ACUITY_SCORE: 11
ADLS_ACUITY_SCORE: 15
ADLS_ACUITY_SCORE: 15
ADLS_ACUITY_SCORE: 11
ADLS_ACUITY_SCORE: 15
ADLS_ACUITY_SCORE: 12
ADLS_ACUITY_SCORE: 7

## 2022-01-01 ASSESSMENT — ENCOUNTER SYMPTOMS
COUGH: 1
FEVER: 0
WHEEZING: 1
SHORTNESS OF BREATH: 1

## 2022-01-01 ASSESSMENT — MIFFLIN-ST. JEOR
SCORE: 1424.35
SCORE: 1499.2
SCORE: 1481.05
SCORE: 1492.85
SCORE: 1474.7

## 2022-01-06 PROBLEM — J18.9 PNEUMONIA OF RIGHT LOWER LOBE DUE TO INFECTIOUS ORGANISM: Status: ACTIVE | Noted: 2022-01-01

## 2022-01-06 PROBLEM — J44.1 COPD EXACERBATION (H): Status: ACTIVE | Noted: 2022-01-01

## 2022-01-06 PROBLEM — J10.1 INFLUENZA A: Status: ACTIVE | Noted: 2022-01-01

## 2022-01-06 PROBLEM — R09.02 HYPOXIA: Status: ACTIVE | Noted: 2022-01-01

## 2022-01-06 NOTE — H&P
ADMISSION HISTORY & PHYSICAL        Patient YOB: 1945  Admit date: 1/6/2022  Date of Service: 1/6/2022    ASSESSMENT AND PLAN:  76 year old male presenting with shortness of breath for the past 7 days.  Patient has a past medical history of CHF, COPD, coronary artery disease, atrial fibrillation.  Patient was saturating in high 80s upon arrival in ED.  Received nebulization treatment, steroid, antibiotic and Tamiflu for influenza type A infection.     Acute hypoxic respiratory failure secondary to community-acquired pneumonia  -On 6 L nasal cannula.  Taper as able  -Continuous oximetry  -Chest x-ray reported as new focal airspace consolidation in the right lower lung consistent with new right-sided pneumonia.  -Due to influenza infection patient is at a higher risk of staphylococcal pneumonia  -We will get sputum culture, follow-up blood culture  -Continue Zosyn and doxycycline    Influenza infection  -Droplet isolation  -Tamiflu    COPD exacerbation  -DuoNeb treatment 4 times daily  -Continue oxygen with a goal saturation of up to 92%  -Methylprednisone 40 mg IV 3 times daily.  Taper gradually  -Breo Ellipta daily    History of CHF  -Patient has a history of heart failure with preserved ejection fraction with pulmonary hypertension,  could be related to CHF or COPD  - last echo was 60 %.  We will get a repeat echo  -Significantly elevated BNP.  Patient does not appear volume overloaded..  Could be related to CKD  -PTA carvedilol  -PTA torsemide    TIFFANIE on CKD  -Creatinine increased from 1.39-1.92  -Gentle hydration  -Monitor input and output  -Avoid nephrotoxic medications  -Daily weight    Coronary artery disease  -Denies having chest pain or palpitation.  -Troponin is 0.14.  Will trend troponin twice  -Status post CABG in the past  -Echocardiogram  -Telemetry  -PTA atorvastatin    Diabetes mellitus type 2  -No diabetic medication is seen in home medication  -Time short-acting insulin  -High  resistance insulin sliding scale  -Anticipate hyperglycemia due to steroid  -A1c  -Accu-Cheks  -Hypoglycemia protocol    Parkinson's disease  -PTA Sinemet    History of  atrial fibrillation  -PTA Xarelto    CODE STATUS:  Full Code    Disposition:  -Anticipated Length of Stay in midnights and medical necessity (including a midnight in the Emergency Department after triage if applicable): >2 days      CHIEF COMPLAINT:  Shortness of breath of 1 week duration    HISTORY OF PRESENTING ILLNESS:  Mr.: Srivastava is a 76-year-old male patient with past medical history of congestive heart failure, hypertension, hyperlipidemia, coronary artery disease, COPD, diabetes mellitus and Parkinson's disease.  He was relatively healthy until a week ago at which time he started to develop shortness of breath associated with dry cough initially and which was progressively became productive of whitish sputum.  Patient denies having chest pain or palpitation.  He also denies having abdominal pain nausea vomiting diarrhea.  Upon arrival at ED patient was desaturating in the high 80s.  Was put on 6 L nasal cannula oxygen.  He did receive nebulization treatment. He also received Zosyn antibiotic dose and methylprednisone .  Labs done in ED are significant for elevated creatinine of 1.92 from baseline 1.39.  BNP elevated at 1771.  Troponin is 0.14.  WBC slightly elevated.  Influenza type a test is positive.  Patient received Tamiflu.  Patient will be admitted to medicine telemetry for further management of pneumonia versus COPD exacerbation.    PMH/PSH:  Patient Active Problem List   Diagnosis     CHF (congestive heart failure) (H)     Dyspnea     Hypertension     Hyperlipidemia     Acute respiratory failure with hypoxia (H)     CAD (coronary artery disease)     Type 2 diabetes mellitus with diabetic chronic kidney disease (H)     Influenza A     Hypoxia     COPD exacerbation (H)     Pneumonia of right lower lobe due to infectious organism        No past medical history on file.     Past Surgical History:   Procedure Laterality Date     BACK SURGERY       BYPASS GRAFT ARTERY CORONARY      quadruple bypass     IR LUMBAR EPIDURAL STEROID INJECTION  6/1/2006     IR LUMBAR EPIDURAL STEROID INJECTION  7/26/2006     IR LUMBAR EPIDURAL STEROID INJECTION  10/11/2006     IR LUMBAR EPIDURAL STEROID INJECTION  8/15/2007     IR LUMBAR EPIDURAL STEROID INJECTION  12/7/2007     IR LUMBAR EPIDURAL STEROID INJECTION  4/15/2008     JOINT REPLACEMENT       SPLENECTOMY, TOTAL            ALLERGIES:  No Known Allergies    MEDICATIONS:  Reviewed.  Current Facility-Administered Medications   Medication     acetaminophen (TYLENOL) tablet 650 mg    Or     acetaminophen (TYLENOL) Suppository 650 mg     albuterol (PROVENTIL HFA/VENTOLIN HFA) inhaler     atorvastatin (LIPITOR) tablet 80 mg     busPIRone (BUSPAR) half-tab 7.5 mg     carbidopa-levodopa (SINEMET)  MG per tablet 1 tablet     carvedilol (COREG) tablet 6.25 mg     glucose gel 15-30 g    Or     dextrose 50 % injection 25-50 mL    Or     glucagon injection 1 mg     doxycycline (VIBRAMYCIN) 100 mg vial to attach to  mL bag     DULoxetine (CYMBALTA) DR capsule 40 mg     [START ON 1/7/2022] ferrous sulfate (FEROSUL) tablet 325 mg     fluticasone (FLONASE) 50 MCG/ACT spray 2 spray     fluticasone-vilanterol (BREO ELLIPTA) 100-25 MCG/INH inhaler 1 puff    And     umeclidinium (INCRUSE ELLIPTA) 62.5 MCG/INH inhaler 1 puff     gabapentin (NEURONTIN) capsule 400 mg     HYDROmorphone (DILAUDID) half-tab 1 mg     HYDROmorphone (PF) (DILAUDID) injection 0.2 mg     insulin aspart (NovoLOG) injection (RAPID ACTING)     insulin aspart (NovoLOG) injection (RAPID ACTING)     insulin aspart (NovoLOG) injection (RAPID ACTING)     ipratropium - albuterol 0.5 mg/2.5 mg/3 mL (DUONEB) neb solution 3 mL     lidocaine (LMX4) cream     lidocaine 1 % 0.1-1 mL     melatonin tablet 1 mg     methylPREDNISolone sodium succinate  (solu-MEDROL) injection 40 mg     ondansetron (ZOFRAN-ODT) ODT tab 4 mg    Or     ondansetron (ZOFRAN) injection 4 mg     pantoprazole (PROTONIX) EC tablet 40 mg     Patient is already receiving anticoagulation with heparin, enoxaparin (LOVENOX), warfarin (COUMADIN)  or other anticoagulant medication     piperacillin-tazobactam (ZOSYN) 3.375 g vial to attach to  mL bag     rivaroxaban ANTICOAGULANT (XARELTO) tablet 15 mg     senna-docusate (SENOKOT-S/PERICOLACE) 8.6-50 MG per tablet 1 tablet    Or     senna-docusate (SENOKOT-S/PERICOLACE) 8.6-50 MG per tablet 2 tablet     sodium chloride (PF) 0.9% PF flush 3 mL     sodium chloride (PF) 0.9% PF flush 3 mL     sodium chloride 0.9% infusion     tamsulosin (FLOMAX) capsule 0.4 mg     torsemide (DEMADEX) tablet 20 mg     torsemide (DEMADEX) tablet 60 mg     Current Outpatient Medications   Medication     acetaminophen (TYLENOL) 325 MG tablet     albuterol (PROAIR HFA/PROVENTIL HFA/VENTOLIN HFA) 108 (90 Base) MCG/ACT inhaler     atorvastatin (LIPITOR) 80 MG tablet     busPIRone (BUSPAR) 7.5 MG tablet     carbidopa-levodopa (SINEMET)  mg per tablet     carvedilol (COREG) 6.25 MG tablet     diclofenac (VOLTAREN) 1 % topical gel     DULoxetine (CYMBALTA) 20 MG capsule     ferrous sulfate (FEROSUL) 325 (65 Fe) MG tablet     fish oil-omega-3 fatty acids 1000 MG capsule     fluticasone (FLONASE) 50 MCG/ACT nasal spray     Fluticasone-Umeclidin-Vilanterol (TRELEGY ELLIPTA) 100-62.5-25 MCG/INH oral inhaler     gabapentin (NEURONTIN) 400 MG capsule     melatonin 3 MG tablet     nitroGLYcerin (NITROSTAT) 0.4 MG sublingual tablet     pantoprazole (PROTONIX) 40 MG EC tablet     rivaroxaban ANTICOAGULANT (XARELTO) 15 MG TABS tablet     senna-docusate (SENOKOT-S/PERICOLACE) 8.6-50 MG tablet     tamsulosin (FLOMAX) 0.4 MG capsule     torsemide (DEMADEX) 20 MG tablet     torsemide (DEMADEX) 20 MG tablet     traMADol (ULTRAM) 50 MG tablet       Current Facility-Administered  Medications:      acetaminophen (TYLENOL) tablet 650 mg, 650 mg, Oral, Q6H PRN **OR** acetaminophen (TYLENOL) Suppository 650 mg, 650 mg, Rectal, Q6H PRN, Karlee Dye MD     albuterol (PROVENTIL HFA/VENTOLIN HFA) inhaler, 1-6 puff, Inhalation, Q6H PRN, Karlee Dye MD     atorvastatin (LIPITOR) tablet 80 mg, 80 mg, Oral, At Bedtime, Karlee Dye MD     busPIRone (BUSPAR) half-tab 7.5 mg, 7.5 mg, Oral, BID, Karlee Dye MD     carbidopa-levodopa (SINEMET)  MG per tablet 1 tablet, 1 tablet, Oral, BID, Karlee Dye MD     carvedilol (COREG) tablet 6.25 mg, 6.25 mg, Oral, BID w/meals, Karlee Dye MD     glucose gel 15-30 g, 15-30 g, Oral, Q15 Min PRN **OR** dextrose 50 % injection 25-50 mL, 25-50 mL, Intravenous, Q15 Min PRN **OR** glucagon injection 1 mg, 1 mg, Subcutaneous, Q15 Min PRN, Karlee Dye MD     doxycycline (VIBRAMYCIN) 100 mg vial to attach to  mL bag, 100 mg, Intravenous, Q12H, Karlee Dye MD     DULoxetine (CYMBALTA) DR capsule 40 mg, 40 mg, Oral, Daily, Karlee Dye MD     [START ON 1/7/2022] ferrous sulfate (FEROSUL) tablet 325 mg, 325 mg, Oral, Daily with breakfast, Karlee Dye MD     fluticasone (FLONASE) 50 MCG/ACT spray 2 spray, 2 spray, Both Nostrils, Daily, Karlee Dye MD     fluticasone-vilanterol (BREO ELLIPTA) 100-25 MCG/INH inhaler 1 puff, 1 puff, Inhalation, Daily **AND** umeclidinium (INCRUSE ELLIPTA) 62.5 MCG/INH inhaler 1 puff, 1 puff, Inhalation, Daily, Karlee Dye MD     gabapentin (NEURONTIN) capsule 400 mg, 400 mg, Oral, BID, Karlee Dye MD     HYDROmorphone (DILAUDID) half-tab 1 mg, 1 mg, Oral, Q4H PRN, Karlee Dye MD     HYDROmorphone (PF) (DILAUDID) injection 0.2 mg, 0.2 mg, Intravenous, Q2H PRN, Karlee Dye MD     insulin aspart (NovoLOG) injection (RAPID ACTING), , Subcutaneous, TID w/meals,  Karlee Dye MD     insulin aspart (NovoLOG) injection (RAPID ACTING), 1-10 Units, Subcutaneous, TID AC, Karlee Dye MD     insulin aspart (NovoLOG) injection (RAPID ACTING), 1-7 Units, Subcutaneous, At Bedtime, Karlee Dye MD     ipratropium - albuterol 0.5 mg/2.5 mg/3 mL (DUONEB) neb solution 3 mL, 3 mL, Nebulization, 4x daily, Karlee Dye MD     lidocaine (LMX4) cream, , Topical, Q1H PRN, Karlee Dye MD     lidocaine 1 % 0.1-1 mL, 0.1-1 mL, Other, Q1H PRN, Karlee Dye MD     melatonin tablet 1 mg, 1 mg, Oral, At Bedtime PRN, Karlee Dye MD     methylPREDNISolone sodium succinate (solu-MEDROL) injection 40 mg, 40 mg, Intravenous, Q8H, Karlee Dye MD     ondansetron (ZOFRAN-ODT) ODT tab 4 mg, 4 mg, Oral, Q6H PRN **OR** ondansetron (ZOFRAN) injection 4 mg, 4 mg, Intravenous, Q6H PRN, Karlee Dye MD     pantoprazole (PROTONIX) EC tablet 40 mg, 40 mg, Oral, Daily, Karlee Dye MD     Patient is already receiving anticoagulation with heparin, enoxaparin (LOVENOX), warfarin (COUMADIN)  or other anticoagulant medication, , Does not apply, Continuous PRN, Karlee Dye MD     piperacillin-tazobactam (ZOSYN) 3.375 g vial to attach to  mL bag, 3.375 g, Intravenous, Q8H, Karlee Dye MD     rivaroxaban ANTICOAGULANT (XARELTO) tablet 15 mg, 15 mg, Oral, Daily with supper, Karlee Dye MD     senna-docusate (SENOKOT-S/PERICOLACE) 8.6-50 MG per tablet 1 tablet, 1 tablet, Oral, BID PRN **OR** senna-docusate (SENOKOT-S/PERICOLACE) 8.6-50 MG per tablet 2 tablet, 2 tablet, Oral, BID PRN, Karlee Dye MD     sodium chloride (PF) 0.9% PF flush 3 mL, 3 mL, Intracatheter, Q8H, Karlee Dye MD     sodium chloride (PF) 0.9% PF flush 3 mL, 3 mL, Intracatheter, q1 min prn, Karlee Dye MD     sodium chloride 0.9% infusion, , Intravenous, Continuous,  Karlee Dye MD     tamsulosin (FLOMAX) capsule 0.4 mg, 0.4 mg, Oral, At Bedtime, Karlee Dye MD     torsemide (DEMADEX) tablet 20 mg, 20 mg, Oral, Daily, Karlee Dye MD     torsemide (DEMADEX) tablet 60 mg, 60 mg, Oral, Daily, Karlee Dye MD    Current Outpatient Medications:      acetaminophen (TYLENOL) 325 MG tablet, Take 650 mg by mouth every 6 hours as needed for mild pain, Disp: , Rfl:      albuterol (PROAIR HFA/PROVENTIL HFA/VENTOLIN HFA) 108 (90 Base) MCG/ACT inhaler, Inhale 1-6 puffs into the lungs every 6 hours as needed for shortness of breath / dyspnea or wheezing, Disp: , Rfl:      atorvastatin (LIPITOR) 80 MG tablet, Take 80 mg by mouth At Bedtime, Disp: , Rfl:      busPIRone (BUSPAR) 7.5 MG tablet, Take 7.5 mg by mouth 2 times daily, Disp: , Rfl:      carbidopa-levodopa (SINEMET)  mg per tablet, Take 1 tablet by mouth 2 times daily , Disp: , Rfl:      carvedilol (COREG) 6.25 MG tablet, Take 6.25 mg by mouth 2 times daily (with meals), Disp: , Rfl:      diclofenac (VOLTAREN) 1 % topical gel, Apply 2 g topically 2 times daily as needed for moderate pain, Disp: , Rfl:      DULoxetine (CYMBALTA) 20 MG capsule, Take 40 mg by mouth daily, Disp: , Rfl:      ferrous sulfate (FEROSUL) 325 (65 Fe) MG tablet, Take 325 mg by mouth daily (with breakfast), Disp: , Rfl:      fish oil-omega-3 fatty acids 1000 MG capsule, Take 1 g by mouth daily, Disp: , Rfl:      fluticasone (FLONASE) 50 MCG/ACT nasal spray, Spray 2 sprays into both nostrils daily, Disp: , Rfl:      Fluticasone-Umeclidin-Vilanterol (TRELEGY ELLIPTA) 100-62.5-25 MCG/INH oral inhaler, Inhale 1 puff into the lungs daily, Disp: , Rfl:      gabapentin (NEURONTIN) 400 MG capsule, Take 400 mg by mouth 2 times daily, Disp: , Rfl:      melatonin 3 MG tablet, Take 9 mg by mouth At Bedtime, Disp: , Rfl:      nitroGLYcerin (NITROSTAT) 0.4 MG sublingual tablet, Place 0.4 mg under the tongue every 5 minutes as  needed for chest pain For chest pain place 1 tablet under the tongue every 5 minutes for 3 doses. If symptoms persist 5 minutes after 1st dose call 911., Disp: , Rfl:      pantoprazole (PROTONIX) 40 MG EC tablet, Take 40 mg by mouth daily, Disp: , Rfl:      rivaroxaban ANTICOAGULANT (XARELTO) 15 MG TABS tablet, Take 15 mg by mouth daily (with dinner), Disp: , Rfl:      senna-docusate (SENOKOT-S/PERICOLACE) 8.6-50 MG tablet, Take 2 tablets by mouth At Bedtime, Disp: , Rfl:      tamsulosin (FLOMAX) 0.4 MG capsule, Take 0.4 mg by mouth At Bedtime, Disp: , Rfl:      torsemide (DEMADEX) 20 MG tablet, Take 60 mg by mouth daily Take 60 mg in the morning and 20 mg in the afternoon., Disp: , Rfl:      torsemide (DEMADEX) 20 MG tablet, Take 20 mg by mouth daily Take 60 mg in the morning and 20 mg in the afternoon., Disp: , Rfl:      traMADol (ULTRAM) 50 MG tablet, Take 25-50 mg by mouth 2 times daily as needed for severe pain, Disp: , Rfl:    Scheduled Meds:    atorvastatin  80 mg Oral At Bedtime     busPIRone  7.5 mg Oral BID     carbidopa-levodopa  1 tablet Oral BID     carvedilol  6.25 mg Oral BID w/meals     doxycycline (VIBRAMYCIN) IV  100 mg Intravenous Q12H     DULoxetine  40 mg Oral Daily     [START ON 1/7/2022] ferrous sulfate  325 mg Oral Daily with breakfast     fluticasone  2 spray Both Nostrils Daily     fluticasone-vilanterol  1 puff Inhalation Daily    And     umeclidinium  1 puff Inhalation Daily     gabapentin  400 mg Oral BID     insulin aspart   Subcutaneous TID w/meals     insulin aspart  1-10 Units Subcutaneous TID AC     insulin aspart  1-7 Units Subcutaneous At Bedtime     ipratropium - albuterol 0.5 mg/2.5 mg/3 mL  3 mL Nebulization 4x daily     methylPREDNISolone  40 mg Intravenous Q8H     pantoprazole  40 mg Oral Daily     piperacillin-tazobactam  3.375 g Intravenous Q8H     rivaroxaban ANTICOAGULANT  15 mg Oral Daily with supper     sodium chloride (PF)  3 mL Intracatheter Q8H     tamsulosin  0.4 mg  "Oral At Bedtime     torsemide  20 mg Oral Daily     torsemide  60 mg Oral Daily     Continuous Infusions:    - MEDICATION INSTRUCTIONS -       sodium chloride       PRN Meds:.acetaminophen **OR** acetaminophen, albuterol, glucose **OR** dextrose **OR** glucagon, HYDROmorphone, HYDROmorphone, lidocaine 4%, lidocaine (buffered or not buffered), melatonin, ondansetron **OR** ondansetron, - MEDICATION INSTRUCTIONS -, senna-docusate **OR** senna-docusate, sodium chloride (PF)    SOCIAL HISTORY:  Social History     Socioeconomic History     Marital status:      Spouse name: Not on file     Number of children: Not on file     Years of education: Not on file     Highest education level: Not on file   Occupational History     Not on file   Tobacco Use     Smoking status: Smoker, Current Status Unknown     Smokeless tobacco: Not on file   Substance and Sexual Activity     Alcohol use: Not on file     Drug use: Not on file     Sexual activity: Not on file   Other Topics Concern     Not on file   Social History Narrative     Not on file     Social Determinants of Health     Financial Resource Strain: Not on file   Food Insecurity: Not on file   Transportation Needs: Not on file   Physical Activity: Not on file   Stress: Not on file   Social Connections: Not on file   Intimate Partner Violence: Not on file   Housing Stability: Not on file       FAMILY HISTORY:  No family history on file.     ROS:  12 point review of systems reviewed and is negative except for what has already been mentioned in HPI.       PHYSICAL EXAM:  GENRL: Acutely sick looking.  On 6 L nasal cannula.  Mild to moderate respiratory distress.  Able to finish sentences.  /56   Pulse 75   Temp 99.6  F (37.6  C) (Oral)   Resp 28   Ht 1.702 m (5' 7.01\")   Wt 74.8 kg (165 lb)   SpO2 97%   BMI 25.84 kg/m    No intake/output data recorded.  No intake/output data recorded.  HEENT: NC/AT  CHEST: Diffuse wheezes bilaterally.  Crackles heard on right " lower lung area.  HEART: S1S2 normal, irregular  ABDMN: Soft. Non-tender, non-distended.  No guarding or rigidity. Bowel sounds- active  EXTRM: +2 pedal edema  INTGM: No skin rash, no cyanosis or clubbing  MUSCULOSKELETAL: no joint tenderness or swelling on upper and lower extremities  NEURO: Alert and oriented. No focal neurological deficit.        DIAGNOSTIC DATA:    Recent Labs   Lab 01/06/22  0954   WBC 13.4*   HGB 9.7*   HCT 31.6*          Recent Labs   Lab 01/06/22  0954      CO2 27   BUN 29*       No results for input(s): INR in the last 168 hours.    Recent Labs   Lab 01/06/22  0954      CO2 27   BUN 29*       [unfilled]    XR Chest 1 View    Result Date: 1/6/2022  EXAM: XR CHEST 1 VIEW LOCATION: [facility] DATE/TIME: 1/6/2022 11:37 AM INDICATION: Short of breath COMPARISON: 3/10/2016.     IMPRESSION: There is new focal airspace consolidation in the right lower lung consistent with new right-sided pneumonia. There are chronic increased interstitial markings at the lung bases. Heart size is upper normal. Pulmonary vascularity is normal. Postoperative change from median sternotomy. Spinal stimulator electrodes over the midthoracic spine. NOTE: ABNORMAL REPORT THE DICTATION ABOVE DESCRIBES AN ABNORMALITY FOR WHICH FOLLOW-UP IS NEEDED.     US Lower Extremity Venous Duplex Right    Result Date: 1/6/2022  EXAM: US LOWER EXTREMITY VENOUS DUPLEX RIGHT LOCATION: [facility] DATE/TIME: 1/6/2022 10:39 AM INDICATION: Right calf pain COMPARISON: None. TECHNIQUE: Venous Duplex ultrasound of the right lower extremity with and without compression, augmentation and duplex. Color flow and spectral Doppler with waveform analysis performed. FINDINGS: Exam includes the common femoral, femoral, popliteal, and contralateral common femoral veins as well as segmentally visualized deep calf veins and greater saphenous vein. RIGHT: No deep vein thrombosis. No  superficial thrombophlebitis. No popliteal cyst.     IMPRESSION: 1.  No deep venous thrombosis in the right lower extremity.      Patient's new lab studies reviewed personally.  Patient's new radiology reports reviewed personally.  I personally viewed and personally interpreted patient's EKG:     Note created using dragon voice recognition software.  Errors in spelling or words which seems out of context are unintentional.  Sounds alike errors may have escaped editing.     01/06/2022      Karlee Dye  [facility]

## 2022-01-06 NOTE — ED TRIAGE NOTES
Pt arrives with shortness of breath.At home on 5L, currently on 6 L via NC. Pt has history of COPD, CHF and BBB. Denies chest ain. Feels chest pressure with coughing. Received berenice-neb 1x 125 solumedrol. W/o Oxygen at 86%. Dry cough. Pitting edema 1+ in LE. Femur fx history, chronic pain. Not using berenice-neb at home. Dyspneic for 3 x days. Blood sugar 274, DM 2. Wheezing present.

## 2022-01-06 NOTE — ED NOTES
Pt wife Shahla notified of pt being admission to hospital and diagnoses. No questions, no concerns. Notified of no visitor policy. Shahla will call with any questions or concerns.

## 2022-01-06 NOTE — ED PROVIDER NOTES
EMERGENCY DEPARTMENT ENCOUNTER      NAME: Atif Romero  AGE: 76 year old male  YOB: 1945  MRN: [id number]  EVALUATION DATE & TIME: 2022  9:22 AM    PCP: Mike Multani    ED PROVIDER: Kevin Wilde M.D.      Chief Complaint   Patient presents with     Shortness of Breath         FINAL IMPRESSION:  1.  Acute dyspnea.  2.  Acute hypoxia.  3.  Acute COPD exacerbation.  4.  Acute influenza type A.  5.  Acute right lower lobe pneumonia.    ED COURSE & MEDICAL DECISION MAKIN:38 AM I met with the patient to gather history and to perform my initial exam. We discussed plans for the ED course, including diagnostic testing and treatment. PPE worn: cloth mask.  Patient with longstanding respiratory problems including COPD, asthma, congestive heart failure.  Patient chronically on oxygen 5 L by nasal cannula at home.  Shortness of breath and cough coming on for the last 4 days.  Patient notes that it is getting worse.  Medics noted room air saturation of 86%.  Currently 94% on 6 L.  Patient also running a low-grade fever at 99.6 degrees.  Patient has had COVID vaccination.  10:30 AM.  COVID test was negative.  Influenza test positive for influenza type A.  Patient did not get a flu shot this year.  Tamiflu ordered.  12:05 PM.  EKG unremarkable.  Leg ultrasound is negative.  Chest x-ray showing right lower lobe pneumonia.  No vascular changes.  Chronic interstitial changes are noted.  COVID testing was negative.  Flu was positive.  Chemistries with BUN and creatinine elevated at 29 and 1.92.  BNP elevated at 1071 troponin at 0.14.  White count 13.4.  Patient received Tamiflu as well as Tylenol Lasix and Zosyn.  Patient breathing better after breathing treatments.  Patient received Solu-Medrol per medics.  Findings include right lower lobe pneumonia, COPD exacerbation, hypoxia, and influenza type A.  Plan admit patient to [facility] inpatient and patient will board here until a bed is available.  Will  page the admitting service.  Patient aware of the findings and in agreement with the plan.  12:15 PM.  Hospitalist called back and is in agreement.    Pertinent Labs & Imaging studies reviewed. (See chart for details)  76 year old male presents to the Emergency Department for evaluation of cough and shortness of breath.     At the conclusion of the encounter I discussed the results of all of the tests and the disposition. The questions were answered. The patient or family acknowledged understanding and was agreeable with the care plan.       MEDICATIONS GIVEN IN THE EMERGENCY:  Medications - No data to display    NEW PRESCRIPTIONS STARTED AT TODAY'S ER VISIT  New Prescriptions    No medications on file       =================================================================    HPI    Patient information was obtained from: patient and EMS    Use of : N/A         Atif Romero is a 76 year old male with a pertinent history of atrial fibrillation, bacterial pneumonia, CKD III, cirrhosis, lymphoma, hypertension, respiratory failure with hypoxia and hypercapnia, sepsis, s/p CABG, CHF, and type II diabetes mellitus who presents to this ED via EMS for evaluation of cough and shortness of breath.     Per EMS, on arrival patient had SpO2 at 84% on room air. Patient is chronically on 5L O2 at home. Here in the ED, patient SpO2 is at 94% on 6L O2.     The patient reports worsening cough, shortness of breath, and wheezing over the past 3-4 days. He notes a personal medical history of COPD, asthma, and CHF. Patient is vaccinated against COVID. Patient denies having a fever.     He does not identify any waxing or waning symptoms otherwise, exacerbating or alleviating features,associated symptoms except as mentioned. He denies any pain related complaints.    ScHx: Endorses tobacco use, specified 1-2 cigarettes per day.       REVIEW OF SYSTEMS   Review of Systems   Constitutional: Negative for fever.   Respiratory:  Positive for cough, shortness of breath and wheezing.    All other systems reviewed and are negative.       PAST MEDICAL HISTORY:  No past medical history on file.    PAST SURGICAL HISTORY:  Past Surgical History:   Procedure Laterality Date     BACK SURGERY       BYPASS GRAFT ARTERY CORONARY      quadruple bypass     IR LUMBAR EPIDURAL STEROID INJECTION  6/1/2006     IR LUMBAR EPIDURAL STEROID INJECTION  7/26/2006     IR LUMBAR EPIDURAL STEROID INJECTION  10/11/2006     IR LUMBAR EPIDURAL STEROID INJECTION  8/15/2007     IR LUMBAR EPIDURAL STEROID INJECTION  12/7/2007     IR LUMBAR EPIDURAL STEROID INJECTION  4/15/2008     JOINT REPLACEMENT       SPLENECTOMY, TOTAL         CURRENT MEDICATIONS:    aspirin 81 MG EC tablet  carbidopa-levodopa (SINEMET)  mg per tablet  carvedilol (COREG) 25 MG tablet  citalopram (CELEXA) 20 MG tablet  erythromycin ophthalmic ointment  fenofibrate (TRIGLIDE) 160 MG tablet  furosemide (LASIX) 40 MG tablet  glipiZIDE (GLUCOTROL) 10 MG 24 hr tablet  isosorbide mononitrate (IMDUR) 60 MG 24 hr tablet  latanoprost (XALATAN) 0.005 % ophthalmic solution  lisinopril (PRINIVIL,ZESTRIL) 40 MG tablet  LYRICA 150 mg capsule  MEDICATION CANNOT BE REORDERED - PLEASE MANUALLY REORDER AND DISCONTINUE THE OLD ORDER  oxyCODONE (ROXICODONE) 5 MG immediate release tablet  pregabalin (LYRICA) 150 MG capsule  simvastatin (ZOCOR) 80 MG tablet      ALLERGIES:  No Known Allergies    FAMILY HISTORY:  No family history on file.    SOCIAL HISTORY:   Social History     Socioeconomic History     Marital status:      Spouse name: Not on file     Number of children: Not on file     Years of education: Not on file     Highest education level: Not on file   Occupational History     Not on file   Tobacco Use     Smoking status: Smoker, Current Status Unknown     Smokeless tobacco: Not on file   Substance and Sexual Activity     Alcohol use: Not on file     Drug use: Not on file     Sexual activity: Not on  file   Other Topics Concern     Not on file   Social History Narrative     Not on file     Social Determinants of Health     Financial Resource Strain: Not on file   Food Insecurity: Not on file   Transportation Needs: Not on file   Physical Activity: Not on file   Stress: Not on file   Social Connections: Not on file   Intimate Partner Violence: Not on file   Housing Stability: Not on file   Patient smokes 1 to 2 cigarettes a day.  Denies drugs or alcohol.    VITALS:  /59   Pulse 81   Temp 99.6  F (37.6  C) (Oral)   Resp 30   Wt 74.8 kg (165 lb)   SpO2 94%   BMI 23.01 kg/m      PHYSICAL EXAM    Vital Signs:  /59   Pulse 81   Temp 99.6  F (37.6  C) (Oral)   Resp 30   Wt 74.8 kg (165 lb)   SpO2 94%   BMI 23.01 kg/m    General:  On entering the room he is in no apparent distress.    Neck:  Neck supple with full range of motion and nontender.    Back:  Back and spine are nontender.  No costovertebral angle tenderness.    HEENT:  Oropharynx clear with moist mucous membranes.  HEENT unremarkable.    Pulmonary:  Chest clear to auscultation without rhonchi rales.  Expiratory phase prolongation.  Expiratory phase wheezing.  Breath sounds equal bilaterally.  Dry cough is noted.  Cardiovascular:  Cardiac regular rate and rhythm without murmurs rubs or gallops.    Abdomen:  Abdomen soft nontender.  There is no rebound or guarding.    Muskuloskeletal:  he moves all 4 without any difficulty and has normal neurovascular exams.  Extremities without clubbing, cyanosis, or edema.  Legs and calves are nontender on the left with mild tenderness in the right calf.  No enlargement or swelling.  Good pulse capillary fill.  Sensation intact soft touch..    Neuro:  he is alert and oriented ×3 and moves all extremities symmetrically.    Psych:  Normal affect.    Skin:  Unremarkable and warm and dry.       LAB:  All pertinent labs reviewed and interpreted.  Labs Ordered and Resulted from Time of ED Arrival to Time of  ED Departure   INFLUENZA A/B & SARS-COV2 PCR MULTIPLEX - Abnormal       Result Value    Influenza A PCR Positive (*)     Influenza B PCR Negative      SARS CoV2 PCR Negative     BASIC METABOLIC PANEL - Abnormal    Sodium 143      Potassium 4.1      Chloride 106      Carbon Dioxide (CO2) 27      Anion Gap 10      Urea Nitrogen 29 (*)     Creatinine 1.92 (*)     Calcium 7.6 (*)     Glucose 139 (*)     GFR Estimate 35 (*)    B-TYPE NATRIURETIC PEPTIDE ( EAST ONLY) - Abnormal    BNP 1,071 (*)    CBC WITH PLATELETS AND DIFFERENTIAL - Abnormal    WBC Count 13.4 (*)     RBC Count 3.47 (*)     Hemoglobin 9.7 (*)     Hematocrit 31.6 (*)     MCV 91      MCH 28.0      MCHC 30.7 (*)     RDW 19.0 (*)     Platelet Count 368      % Neutrophils 68      % Lymphocytes 19      % Monocytes 11      % Eosinophils 1      % Basophils 0      % Immature Granulocytes 1      NRBCs per 100 WBC 0      Absolute Neutrophils 9.3 (*)     Absolute Lymphocytes 2.5      Absolute Monocytes 1.5 (*)     Absolute Eosinophils 0.1      Absolute Basophils 0.1      Absolute Immature Granulocytes 0.1      Absolute NRBCs 0.0     TROPONIN I - Normal    Troponin I 0.14         RADIOLOGY:  Reviewed all pertinent imaging. Please see official radiology report.  XR Chest 1 View   Final Result   IMPRESSION: There is new focal airspace consolidation in the right lower lung consistent with new right-sided pneumonia. There are chronic increased interstitial markings at the lung bases. Heart size is upper normal. Pulmonary vascularity is normal.    Postoperative change from median sternotomy. Spinal stimulator electrodes over the midthoracic spine.      NOTE: ABNORMAL REPORT      THE DICTATION ABOVE DESCRIBES AN ABNORMALITY FOR WHICH FOLLOW-UP IS NEEDED.       US Lower Extremity Venous Duplex Right   Final Result   IMPRESSION:   1.  No deep venous thrombosis in the right lower extremity.                 EKG:    Normal sinus rhythm 82, first-degree AV block, left axis  deviation, left bundle branch block, probable LVH pattern.  Otherwise no acute findings.  Very similar to previous EKG of March 9, 2016.    I have independently reviewed and interpreted the EKG(s) documented above.    PROCEDURES:         I, Yahaira Puri, am serving as a scribe to document services personally performed by Dr. Wilde based on my observation and the provider's statements to me. I, Kevin Wilde MD attest that Yahaira Puri is acting in a scribe capacity, has observed my performance of the services and has documented them in accordance with my direction.    Kevin Wilde M.D.  Emergency Medicine  [facility] EMERGENCY DEPARTMENT  [address] [place] [id number] [phone]  Dept: [phone]      Kevin Wilde MD  01/06/22 [id number]       Kevin Wilde MD  01/06/22 [id number]

## 2022-01-06 NOTE — ED NOTES
Bed: JNEDP-03  Expected date: 1/6/22  Expected time:   Means of arrival: Ambulance  Comments:  Moise WHARTON

## 2022-01-06 NOTE — PHARMACY-ADMISSION MEDICATION HISTORY
Pharmacy Note - Admission Medication History    Pertinent Provider Information:      ______________________________________________________________________    Prior To Admission (PTA) med list completed and updated in EMR.       PTA Med List   Medication Sig Last Dose     acetaminophen (TYLENOL) 325 MG tablet Take 650 mg by mouth every 6 hours as needed for mild pain      albuterol (PROAIR HFA/PROVENTIL HFA/VENTOLIN HFA) 108 (90 Base) MCG/ACT inhaler Inhale 1-6 puffs into the lungs every 6 hours as needed for shortness of breath / dyspnea or wheezing      atorvastatin (LIPITOR) 80 MG tablet Take 80 mg by mouth At Bedtime 1/5/2022 at Unknown time     busPIRone (BUSPAR) 7.5 MG tablet Take 7.5 mg by mouth 2 times daily 1/5/2022 at Unknown time     carbidopa-levodopa (SINEMET)  mg per tablet Take 1 tablet by mouth 2 times daily  1/5/2022 at pm     carvedilol (COREG) 6.25 MG tablet Take 6.25 mg by mouth 2 times daily (with meals) 1/5/2022 at pm     diclofenac (VOLTAREN) 1 % topical gel Apply 2 g topically 2 times daily as needed for moderate pain      DULoxetine (CYMBALTA) 20 MG capsule Take 40 mg by mouth daily 1/5/2022 at Unknown time     ferrous sulfate (FEROSUL) 325 (65 Fe) MG tablet Take 325 mg by mouth daily (with breakfast) 1/5/2022 at Unknown time     fish oil-omega-3 fatty acids 1000 MG capsule Take 1 g by mouth daily 1/5/2022 at Unknown time     fluticasone (FLONASE) 50 MCG/ACT nasal spray Spray 2 sprays into both nostrils daily 1/5/2022 at Unknown time     Fluticasone-Umeclidin-Vilanterol (TRELEGY ELLIPTA) 100-62.5-25 MCG/INH oral inhaler Inhale 1 puff into the lungs daily 1/5/2022 at Unknown time     gabapentin (NEURONTIN) 400 MG capsule Take 400 mg by mouth 2 times daily 1/5/2022 at pm     melatonin 3 MG tablet Take 9 mg by mouth At Bedtime 1/5/2022 at Unknown time     nitroGLYcerin (NITROSTAT) 0.4 MG sublingual tablet Place 0.4 mg under the tongue every 5 minutes as needed for chest pain For chest  pain place 1 tablet under the tongue every 5 minutes for 3 doses. If symptoms persist 5 minutes after 1st dose call 911.      pantoprazole (PROTONIX) 40 MG EC tablet Take 40 mg by mouth daily 1/5/2022 at Unknown time     rivaroxaban ANTICOAGULANT (XARELTO) 15 MG TABS tablet Take 15 mg by mouth daily (with dinner) 1/5/2022 at Unknown time     senna-docusate (SENOKOT-S/PERICOLACE) 8.6-50 MG tablet Take 2 tablets by mouth At Bedtime 1/5/2022 at Unknown time     tamsulosin (FLOMAX) 0.4 MG capsule Take 0.4 mg by mouth At Bedtime 1/5/2022 at Unknown time     torsemide (DEMADEX) 20 MG tablet Take 60 mg by mouth daily Take 60 mg in the morning and 20 mg in the afternoon. 1/5/2022 at am     torsemide (DEMADEX) 20 MG tablet Take 20 mg by mouth daily Take 60 mg in the morning and 20 mg in the afternoon. 1/5/2022 at afternoon     traMADol (ULTRAM) 50 MG tablet Take 25-50 mg by mouth 2 times daily as needed for severe pain        Information source(s): Family member and Clinic records  Method of interview communication: phone    Summary of Changes to PTA Med List  New: tramadol, Flonase, Trelegy Ellipta, gabapentin, pantoprazole, torsemide, fish oil, ferrous sulfate, diclofenac, nitroglycerin, acetaminophen, senna-docusate, melatonin, albuterol, atorvastatin, buspirone, duloxetine, rivaroxaban, tamsulosin  Discontinued: aspirin, citalopram, erthromycin, fenofibrate, furosemide, glipizide, isosorbide, latanoprost, lisinopril, Lyrica, oxycodone, simvastatin  Changed: carbidopa-levodopa, carvedilol    Patient was asked about OTC/herbal products specifically.  PTA med list reflects this.    In the past week, patient estimated taking medication this percent of the time:  greater than 90%.    Allergies were reviewed, assessed, and updated with the patient.      Patient did not bring any medications to the hospital and can't retrieve from home. No multi-dose medications are available for use during hospital stay.     The information  provided in this note is only as accurate as the sources available at the time of the update(s).    Thank you for the opportunity to participate in the care of this patient.    Laura Kauffman [facility]  1/6/2022 11:49 AM

## 2022-01-07 NOTE — CONSULTS
"Care Management Initial Consult    General Information  Assessment completed with: Spouse or significant other, Shahla wife via phone  Type of CM/SW Visit: Initial Assessment    Primary Care Provider verified and updated as needed: Yes   Readmission within the last 30 days: no previous admission in last 30 days      Reason for Consult: discharge planning  Advance Care Planning: Advance Care Planning Reviewed: other (comment) (\"don't have one\")          Communication Assessment  Patient's communication style: spoken language (English or Bilingual)    Hearing Difficulty or Deaf: no   Wear Glasses or Blind: no    Cognitive  Cognitive/Neuro/Behavioral: WDL                      Living Environment:   People in home: spouse  Shahla  Current living Arrangements: house (\"split entry house - he normally uses the steps fine\")      Able to return to prior arrangements: yes       Family/Social Support:  Care provided by: self  Provides care for: no one  Marital Status:   Wife  Shahla       Description of Support System: Supportive,Involved    Support Assessment: Adequate family and caregiver support,Adequate social supports,Patient communicates needs well met    Current Resources:   Patient receiving home care services: No     Community Resources: DME ([employer])  Equipment currently used at home: walker, rolling,walker, standard,cane, straight (\"4WW with seat for inside; regular walker for outside\")  Supplies currently used at home: Oxygen Tubing/Supplies,Other (\"home oxygen; dentures\")    Employment/Financial:  Employment Status: retired     Employment/ Comments: \"no  benefits\"  Financial Concerns:     Referral to Financial Counselor: No       Lifestyle & Psychosocial Needs:  Social Determinants of Health     Tobacco Use: High Risk     Smoking Tobacco Use: Smoker, Current Status Unknown     Smokeless Tobacco Use: Unknown   Alcohol Use: Not on file   Financial Resource Strain: Not on file   Food " Insecurity: Not on file   Transportation Needs: Not on file   Physical Activity: Not on file   Stress: Not on file   Social Connections: Not on file   Intimate Partner Violence: Not on file   Depression: Not on file   Housing Stability: Not on file       Functional Status:  Prior to admission patient needed assistance:   Dependent ADLs:: Ambulation-walker,Ambulation-cane  Dependent IADLs:: Cleaning,Cooking,Laundry  Assesssment of Functional Status: Not at baseline with ADL Functioning,Not at baseline with mobility,Not at  functional baseline    Mental Health Status:          Chemical Dependency Status:                Values/Beliefs:  Spiritual, Cultural Beliefs, Episcopal Practices, Values that affect care:                 Additional Information:  Atif is influeza +. He lives in a split entry house with his wife.     He is independent with ADLs at baseline and drives.    He uses Home oxygen 5LPM from [employer].    He uses a walker most of the time for mobility but also has 2-3 canes he can use PRN. He has a wheeled walker with a seat for inside the house. Then he has a regular walker for when he is outside the house.    Unknown discharge needs at this time.    Wife to transport at discharge.    Nia Rankin RN

## 2022-01-07 NOTE — PLAN OF CARE
Problem: Gas Exchange Impaired  Goal: Optimal Gas Exchange  Outcome: Improving   Pt weaned to 5L NC.  Maintaining O2 sats > than 92%.  Frequent cough.  Wheezing and congestion improved throughout shift.    Ayesha Ray RN

## 2022-01-07 NOTE — ED NOTES
"[facility] ED Handoff Report    ED Chief Complaint: Short of breath    ED Diagnosis:  (J18.9) Pneumonia of right lower lobe due to infectious organism  Comment: flu  Plan: treat symptoms    (J10.1) Influenza A  Comment:   Plan:     (J44.1) COPD exacerbation (H)  Comment:   Plan: oxygen    (R09.02) Hypoxia  Comment:   Plan: oxygen       PMH:  No past medical history on file.     Code Status:  Full Code     Falls Risk: Yes Band: Applied    Current Living Situation/Residence: lives with a significant other     Elimination Status: Continent: Yes     Activity Level: Total assist/lift    Patients Preferred Language:  English     Needed: No    Vital Signs:  BP (!) 150/74   Pulse 62   Temp 99.6  F (37.6  C) (Oral)   Resp 28   Ht 1.702 m (5' 7.01\")   Wt 74.8 kg (165 lb)   SpO2 93%   BMI 25.84 kg/m       Cardiac Rhythm: nsr    Pain Score: 1/10    Is the Patient Confused:  No    Last Food or Drink: 1/6/22 at 1700    Focused Assessment:  Short of breath    Tests Performed: Done: Labs and Imaging    Treatments Provided:  IV ABx, oxygen    Family Dynamics/Concerns: No    Family Updated On Visitor Policy: Yes    Plan of Care Communicated to Family: Yes    Who Was Updated about Plan of Care: message left with spouse    Belongings Checklist Done and Signed by Patient: Yes    Covid: symptomatic, negative    Additional Information: Influenza A, weak, total assist    RN: TYSON DIANE   1/7/2022 7:20 AM         "

## 2022-01-07 NOTE — PROGRESS NOTES
RCAT Treatment Plan    Patient Score: 11  Patient Acuity: 3    Clinical Indication for Therapy: COPD/PNA    Therapy Ordered: duoneb QID    Assessment Summary: Pt has a pulmonary history of COPD. Pt uses home oxygen at 5 lpm and albuterol inhaler. Pt remains on 5 lpm nasal cannula at baseline with oxygen saturation of 90%. RR in the low 20s. BBS crackles pre treatment. Expiratory wheezes post tx. CXR indicates consolidation in RLL. Pt is alert and oriented. Will change duoneb to respimat and add flutter valve PRN for bronchial hygiene.     S[facility] Chacon, RT  1/7/2022

## 2022-01-07 NOTE — PROGRESS NOTES
[facility]    Medicine Progress Note - Hospitalist Service       Date of Admission:  1/6/2022    Assessment & Plan            76 year old male with past medical history of CHF, COPD with CRF, coronary artery disease, atrial fibrillation brought in to the emergency room department by EMS for further work-up and evaluation of hypoxia and worsening shortness of breath and cough. Found to have signs concerning for lobar pneumonia and influenza A     Acute non chronic hypoxic respiratory failure   ---On 6 L nasal cannula.  Usually on 5 litersTaper as able  ---Suspect multifactorial and mainly driven by respiratory infection, influenza, COPD but some concern for elevated BNP suggestive of CHF  ---IV diuretic this more  ---Continue other treatments with antibiotics pending pro-Blaise, Tamiflu steroids and nebs per below  -Continuous oximetry  ---COVID negative (Vaccinated)    Community-acquired pneumonia  -Chest x-ray reported as new focal airspace consolidation in the right lower lung consistent with new right-sided pneumonia.  -higher risk of staphylococcal pneumonia  ---check procal  -We will get sputum culture, respiratory culture  --- follow-up blood culture  -Continue Zosyn and doxycycline pending pro-Blaise and improved  ---with weakness post pt and oT     Influenza infection  -contriuting to above  --not vaccinated  --Droplet isolation  -Tamiflu     COPD exacerbation  ---has CRF on oxygen at home  -DuoNeb treatment 4 times daily  -Wean oxygen back down to baseline 5 L  -Methylprednisone 40 mg IV 3 times daily.    --- Start oral prednisone tomorrow   -Breo Ellipta daily     History of CHF  -Presacral edema, elevated BNP, but appears overall euvolemic so difficult to interpret  - last echo was 60 %.    --- repeat echo pending  -PTA carvedilol  -PTA torsemide to be held in place of IV Lasix for today, resume when renal function stable and osyen status improved     TIFFANIE on CKD  -worsened again today,  may be related to CHF? Does not appear hypovolemic either and no vomiting or diarrhea  ---giving IV lasix for two doses and holding oral torsemide so monitor   ---appears baseline 1.2-1.4  - stop hydration in setting of possible CHF  -Monitor input and output  -Avoid nephrotoxic medications  -Daily weight     Coronary artery disease  -Denies having chest pain or palpitation.  -negative serial  -Status post CABG in the past  -Echocardiogram  -Telemetry until tomorrow am then consider dc  -PTA atorvastatin, coreg     Diabetes mellitus type 2  -No diabetic medication is seen in home medication  -High resistance insulin sliding scale  -Anticipate hyperglycemia due to steroid  -A1c 6.3     Parkinson's disease  -PTA Sinemet     History of  atrial fibrillation  -PTA Xarelto and coreg  --monitor rate on tele       Diet: Combination Diet Regular Diet Adult; 2 gm NA Diet    DVT Prophylaxis: DOAC  Engle Catheter: Not present  Central Lines: None  Code Status: Full Code      Disposition Plan   Expected Discharge: 01/09/2022     Anticipated discharge location:  Awaiting care coordination huddle  Delays:           The patient's care was discussed with the Bedside Nurse and Patient.    Nyasia Horne MD  Hospitalist Service  [facility])  Text page via [facility] Paging/Directory        Clinically Significant Risk Factors Present on Admission                   ______________________________________________________________________    Interval History     Patient seen and care discussed with nursing staff. He admits to approximately 1 week of illness. Mainly shortness of breath and cough. Denies fevers, nausea vomiting or diarrhea.    EMS was called 4 days prior to admission during the night when he felt short of breath and he declined admission. He then asked for EMS to be called last night due to worsening shortness of breath. He tells me that he is worried about his CHF and then describes to me how he takes  diuretics based on his amount of ankle edema. However he denies chest pain and denies any acute worsening.    Confirms usually on 5 L nasal cannula oxygen    Data reviewed today: I reviewed all medications, new labs and imaging results over the last 24 hours.     Physical Exam   Vital Signs: Temp: 99.6  F (37.6  C) Temp src: Oral BP: (!) 157/77 (new IV placement) Pulse: 61   Resp: 28 SpO2: 95 % O2 Device: Oxymask Oxygen Delivery: 6 LPM  Weight: 165 lbs 0 oz  General Appearance: Frail, audible congestion but not overly dyspneic  Respiratory: Bilateral rhonchi and crackles heard more in the left lung, no wheezing interestingly. Mild amount presacral  Cardiovascular: Sounds regular without significant murmurs today.  GI: Soft and nontender, no masses palpable   Skin: Scattered senile purpura and seborrheic keratoses, otherwise no open areas or rash  Other: Neurologically grossly intact without focal deficits.    Data   Recent Labs   Lab 01/07/22  0901 01/07/22  0800 01/06/22  2226 01/06/22  1745 01/06/22  0954   WBC  --  14.2*  --   --  13.4*   HGB  --  9.4*  --   --  9.7*   MCV  --  92  --   --  91   PLT  --  348  --   --  368   NA  --  142  --   --  143   POTASSIUM  --  4.2  --   --  4.1   CHLORIDE  --  104  --   --  106   CO2  --  28  --   --  27   BUN  --  36*  --   --  29*   CR  --  2.20*  --   --  1.92*   ANIONGAP  --  10  --   --  10   GELY  --  8.1*  --   --  7.6*   * 123 244*   < > 139*    < > = values in this interval not displayed.     Recent Results (from the past 24 hour(s))   US Lower Extremity Venous Duplex Right    Narrative    EXAM: US LOWER EXTREMITY VENOUS DUPLEX RIGHT  LOCATION: [facility]  DATE/TIME: 1/6/2022 10:39 AM    INDICATION: Right calf pain  COMPARISON: None.  TECHNIQUE: Venous Duplex ultrasound of the right lower extremity with and without compression, augmentation and duplex. Color flow and spectral Doppler with waveform analysis performed.    FINDINGS: Exam  includes the common femoral, femoral, popliteal, and contralateral common femoral veins as well as segmentally visualized deep calf veins and greater saphenous vein.     RIGHT: No deep vein thrombosis. No superficial thrombophlebitis. No popliteal cyst.      Impression    IMPRESSION:  1.  No deep venous thrombosis in the right lower extremity.   XR Chest 1 View    Narrative    EXAM: XR CHEST 1 VIEW  LOCATION: [facility]  DATE/TIME: 2022 11:37 AM    INDICATION: Short of breath  COMPARISON: 3/10/2016.      Impression    IMPRESSION: There is new focal airspace consolidation in the right lower lung consistent with new right-sided pneumonia. There are chronic increased interstitial markings at the lung bases. Heart size is upper normal. Pulmonary vascularity is normal.   Postoperative change from median sternotomy. Spinal stimulator electrodes over the midthoracic spine.    NOTE: ABNORMAL REPORT    THE DICTATION ABOVE DESCRIBES AN ABNORMALITY FOR WHICH FOLLOW-UP IS NEEDED.    Echocardiogram Complete   Result Value    LVEF  45-50% (mildly reduced)    Narrative    [id number]  [id number]  [id number]  [id number]^JOEL[place] [address]     Name: DINA SANCHEZ  MRN: [id number]  : 1945  Study Date: 2022 06:59 AM  Age: 76 yrs  Gender: Male  Patient Location: [facility]  Reason For Study: CHF  Ordering Physician: GUERO MALDONADO  Performed By: YOLY     BSA: 1.9 m2  Height: 67 in  Weight: 165 lb  HR: 61  ______________________________________________________________________________  Procedure  Compared to the prior study dated 3/10/2016, there have been no changes.  ______________________________________________________________________________  Interpretation Summary     1.Left ventricular function is decreased. The ejection fraction is 45-50%  (mildly reduced).  2.There is mild concentric left ventricular hypertrophy.  3.TAPSE is  abnormal, which is consistent with abnormal right ventricular  systolic function.  4.The left atrium is mild to moderately dilated.  5.There is mild to moderate (1-2+) tricuspid regurgitation.  6.Right ventricular systolic pressure is elevated, consistent with moderate to  severe pulmonary hypertension.  7.IVC diameter >2.1 cm collapsing <50% with sniff suggests a high RA pressure  estimated at 15 mmHg or greater.  8.Compared to the prior study dated 3/10/2016, the LV and RV EF's are  diminished, pulmonary hypertension is now moderate to severe, inferior vena  cava does not collapse.  ______________________________________________________________________________  I      WMSI = 1.00     % Normal = 100     X - Cannot   0 -                      (2) - Mildly 2 -          Segments  Size  Interpret    Hyperkinetic 1 - Normal  Hypokinetic  Hypokinetic  1-2     small                                                     7 -          3-5      moderate  3 - Akinetic 4 -          5 -         6 - Akinetic Dyskinetic   6-14    large               Dyskinetic   Aneurysmal  w/scar       w/scar       15-16   diffuse     Left Ventricle  Left ventricular function is decreased. The ejection fraction is 45-50%  (mildly reduced). There is mild concentric left ventricular hypertrophy. Grade  III or advanced diastolic dysfunction. No regional wall motion abnormalities  noted.     Right Ventricle  TAPSE is abnormal, which is consistent with abnormal right ventricular  systolic function.     Atria  The left atrium is mild to moderately dilated. Right atrial size is normal.  There is no color Doppler evidence of an atrial shunt.     Mitral Valve  Mitral valve leaflets appear normal. There is no evidence of mitral stenosis  or clinically significant mitral regurgitation. There is mild (1+) mitral  regurgitation. There is no mitral valve stenosis.     Tricuspid Valve  The tricuspid valve is not well visualized, but is grossly normal.  Right  ventricular systolic pressure is elevated, consistent with moderate to severe  pulmonary hypertension. There is mild to moderate (1-2+) tricuspid  regurgitation.     Aortic Valve  Aortic valve leaflets appear normal. There is no evidence of aortic stenosis  or clinically significant aortic regurgitation. There is trivial trileaflet  aortic sclerosis. No aortic regurgitation is present. No aortic stenosis is  present.     Pulmonic Valve  The pulmonic valve is not well seen, but is grossly normal. This degree of  valvular regurgitation is within normal limits. There is no pulmonic valvular  stenosis.     Vessels  The aorta root is normal. Normal size ascending aorta. IVC diameter >2.1 cm  collapsing <50% with sniff suggests a high RA pressure estimated at 15 mmHg or  greater.     Pericardium  There is no pericardial effusion.     Rhythm  Sinus rhythm was noted.     ______________________________________________________________________________  MMode/2D Measurements & Calculations  IVSd: 1.7 cm  LVIDd: 4.8 cm  LVIDs: 3.2 cm  LVPWd: 1.3 cm     FS: 33.2 %  LV mass(C)d: 311.9 grams  LV mass(C)dI: 167.4 grams/m2  Ao root diam: 3.4 cm  LA dimension: 5.4 cm  asc Aorta Diam: 2.8 cm  LA/Ao: 1.6  LVOT diam: 2.3 cm  LVOT area: 4.2 cm2  LA Volume Indexed (AL/bp): 60.7 ml/m2  RWT: 0.55     Doppler Measurements & Calculations  MV E max robb: 145.0 cm/sec  MV A max robb: 44.7 cm/sec  MV E/A: 3.2     MV dec slope: 983.0 cm/sec2  MV dec time: 0.15 sec  Ao V2 max: 126.7 cm/sec  Ao max P.0 mmHg  Ao V2 mean: 88.5 cm/sec  Ao mean PG: 3.5 mmHg  Ao V2 VTI: 25.9 cm  BRII(I,D): 3.0 cm2  BRII(V,D): 2.6 cm2  LV V1 max P.5 mmHg  LV V1 max: 79.2 cm/sec  LV V1 VTI: 18.3 cm  SV(LVOT): 77.0 ml  SI(LVOT): 41.3 ml/m2  PA acc time: 0.08 sec  PI end-d robb: 181.9 cm/sec  TR max robb: 392.1 cm/sec  TR max P.5 mmHg  AV Robb Ratio (DI): 0.62  BRII Index (cm2/m2): 1.6  E/E' av.2  Lateral E/e': 16.1  Medial E/e': 26.3      ______________________________________________________________________________  Report approved by: Adelaida Gillespie 01/07/2022 10:22 AM

## 2022-01-07 NOTE — PROGRESS NOTES
Respiratory care note:    Pt remains on 5 lpm nasal cannula at baseline with SpO2 of 90%. RR in the low 20s. BBS crackles pre tx. Duoneb tx given via filtered neb. BBS crackles with expiratory wheezes post tx. Pt perceives improvement post neb tx. Continue to follow.    Sinai Chacon, RT

## 2022-01-07 NOTE — PROGRESS NOTES
01/07/22 1425   Quick Adds   Type of Visit Initial Occupational Therapy Evaluation   Living Environment   People in home spouse   Current Living Arrangements house   Home Accessibility stairs within home   Self-Care   Usual Activity Tolerance fair   Current Activity Tolerance poor   Regular Exercise No   Instrumental Activities of Daily Living (IADL)   Previous Responsibilities housekeeping;meal prep;laundry;driving   IADL Comments pt states spouse has really bad COPD and he assists in taking care of her, pt gets some help from family   Disability/Function   Hearing Difficulty or Deaf no   Wear Glasses or Blind no   Concentrating, Remembering or Making Decisions Difficulty no   Difficulty Communicating no   Difficulty Eating/Swallowing no   Walking or Climbing Stairs Difficulty no   Dressing/Bathing Difficulty no   Toileting issues no   Fall history within last six months no   General Information   Patient/Family Therapy Goal Statement (OT) none stated   Existing Precautions/Restrictions no known precautions/restrictions   Cognitive Status Examination   Orientation Status orientation to person, place and time   Affect/Mental Status (Cognitive) WNL   Pain Assessment   Patient Currently in Pain No   Range of Motion Comprehensive   General Range of Motion no range of motion deficits identified   Strength Comprehensive (MMT)   General Manual Muscle Testing (MMT) Assessment no strength deficits identified   Bed Mobility   Bed Mobility supine-sit   Supine-Sit [place]ahan (Bed Mobility) minimum assist (75% patient effort)   Assistive Device (Bed Mobility) bed rails   Transfers   Transfers bed-chair transfer;sit-stand transfer   Transfer Skill: Bed to Chair/Chair to Bed   Bed-Chair Callahan (Transfers) contact guard;minimum assist (75% patient effort)   Sit-Stand Transfer   Sit-Stand Callahan (Transfers) contact guard;minimum assist (75% patient effort)   Activities of Daily Living   BADL Assessment lower body  dressing   Lower Body Dressing Assessment   [place] Level (Lower Body Dressing) moderate assist (50% patient effort)   Position (Lower Body Dressing) edge of bed sitting   Clinical Impression   Criteria for Skilled Therapeutic Interventions Met (OT) yes;meets criteria   OT Diagnosis decreased ADL independence   OT Problem List-Impairments impacting ADL activity tolerance impaired;mobility;strength   Assessment of Occupational Performance 1-3 Performance Deficits   Identified Performance Deficits toileting, trsfs, mobility   Planned Therapy Interventions (OT) ADL retraining;balance training;bed mobility training;strengthening   Clinical Decision Making Complexity (OT) moderate complexity   Therapy Frequency (OT) Daily   Predicted Duration of Therapy 4 days   Risk & Benefits of therapy have been explained evaluation/treatment results reviewed;patient   OT Discharge Planning    OT Discharge Recommendation (DC Rec) Transitional Care Facility;Home with assist   OT Rationale for DC Rec pt may be close to baseline however, low endurance and some assist needed for ADL's and pt takes care of spouse

## 2022-01-08 NOTE — PLAN OF CARE
Problem: Adult Inpatient Plan of Care  Goal: Plan of Care Review  Outcome: Improving   BG levels were 147 and 158. Scheduled insulin given as ordered. Assist of 1 with cares and transfers. Using urinal appropriately.

## 2022-01-08 NOTE — PLAN OF CARE
Problem: Gas Exchange Impaired  Goal: Optimal Gas Exchange  Outcome: Improving   Pt on 4L O2 this shift.  5L O2 at baseline.  Up to chair with assist of one.  Intermittent cough.    Ayesha Ray RN

## 2022-01-08 NOTE — PROGRESS NOTES
"Physical Therapy        01/08/22 1512   Quick Adds   Type of Visit Initial PT Evaluation   Living Environment   People in home spouse   Current Living Arrangements house   Home Accessibility stairs to enter home   Number of Stairs, Main Entrance 6   Stair Railings, Main Entrance railings safe and in good condition   Transportation Anticipated car, drives self   Living Environment Comments pt states he crawls up stairs, then uses railings on top 2 steps   Self-Care   Usual Activity Tolerance fair   Current Activity Tolerance fair   Activity/Exercise/Self-Care Comment \"switches off\" with wife for IADLs.   General Information   Onset of Illness/Injury or Date of Surgery 01/06/22   Referring Physician Nyasia Horne MD   Patient/Family Therapy Goals Statement (PT) go home   Pertinent History of Current Problem (include personal factors and/or comorbidities that impact the POC) influenza A, COPD, CHF   Pain Assessment   Patient Currently in Pain No   Posture    Posture Forward head position   Range of Motion (ROM)   ROM Quick Adds ROM WFL   Strength   Manual Muscle Testing Quick Adds No deficits observed during functional mobility   Transfers   Transfer Safety Comments SBA sit<>stand    Gait/Stairs (Locomotion)   [place] Level (Gait) supervision   Assistive Device (Gait) walker, front-wheeled   Distance in Feet (Required for LE Total Joints) 80   Clinical Impression   Criteria for Skilled Therapeutic Intervention yes, treatment indicated   PT Diagnosis (PT) difficulty walking   Influenced by the following impairments deconditioning   Functional limitations due to impairments decreased household mobility   Clinical Presentation Stable/Uncomplicated   Clinical Presentation Rationale presents as medically diagnosed   Clinical Decision Making (Complexity) moderate complexity   Therapy Frequency (PT) Daily   Predicted Duration of Therapy Intervention (days/wks) 4 days   Planned Therapy Interventions (PT) balance " training;bed mobility training;gait training;home exercise program;neuromuscular re-education;stair training;transfer training;strengthening   Anticipated Equipment Needs at Discharge (PT) walker, rolling   Risk & Benefits of therapy have been explained evaluation/treatment results reviewed;care plan/treatment goals reviewed;participants voiced agreement with care plan;participants included;patient   PT Discharge Planning    PT Discharge Recommendation (DC Rec) home with assist   PT Rationale for DC Rec close to baseline, safe to d/c home pending stairs. may need home cares   Total Evaluation Time   Total Evaluation Time (Minutes) 10       Kelle Shah DPT 1/8/2022

## 2022-01-08 NOTE — PROGRESS NOTES
[facility]    Medicine Progress Note - Hospitalist Service       Date of Admission:  1/6/2022    Assessment & Plan              76 year old male with past medical history of CHF, COPD with CRF, coronary artery disease, atrial fibrillation brought in to the emergency room department by EMS for further work-up and evaluation of hypoxia and worsening shortness of breath and cough. Found to have signs concerning for lobar pneumonia and influenza A     Acute non chronic hypoxic respiratory failure   ---On 5 L nasal cannula at baseline, at 5L today  ---Suspect multifactorial and mainly driven by respiratory infection, influenza, COPD but some concern for elevated BNP suggestive of CHF  ---Continue other treatments with diuretics, Tamiflu steroids and nebs per below  ---Continuous oximetry  ---COVID negative (Vaccinated)      Influenza infection  -Chest x-ray reported as new focal airspace consolidation in the right lower lung consistent with new right-sided pneumonia.  -contriuting to above  --not vaccinated  --Droplet isolation  -Tamiflu. Discontinue abx as procal normal.  ---No spuum or blood cx ordered. Respiratory culture pending sample collection.     COPD exacerbation  -DuoNeb treatment 4 times daily  -Wean oxygen back down to baseline 5 L  -Methylprednisone 40 mg IV 3 times on admission, transitioned to oral prednisone today.   -Breo Ellipta daily     History of CHF  -Presacral edema, elevated BNP, but appears overall euvolemic so difficult to interpret  - last echo was 60 %.    -Repeat ECHO EF 45-50%  -PTA carvedilol, torsemide      TIFFANIE on CKD- improving, likely pre-renal.   ---appears baseline 1.2-1.4  - stopped hydration in setting of possible CHF  -Monitor input and output  -Avoid nephrotoxic medications  -Daily weight  --Resume pTA torsemide     Coronary artery disease  -Denies having chest pain or palpitation.  -negative serial  -Status post CABG in the past  -Echocardiogram  reviewed  -PTA atorvastatin, coreg     Diabetes mellitus type 2  -No diabetic medication is seen in home medication  -High resistance insulin sliding scale, monitor closely while pt on steroids  -A1c 6.3     Parkinson's disease  -PTA Sinemet     History of  atrial fibrillation  -PTA Xarelto and coreg  --monitor rate on tele       Diet: Combination Diet Regular Diet Adult; 2 gm NA Diet    DVT Prophylaxis: DOAC  Engle Catheter: Not present  Central Lines: None  Code Status: Full Code      Disposition Plan   Expected Discharge: 01/09/2022     Anticipated discharge location: 2-3 days  Barriers: IV meds, clinical improvement     The patient's care was discussed with the patient, RN, SW/CM    Pat Bonilla MD  Hospitalist Service  [facility])  Text page via [facility] Paging/Directory        Clinically Significant Risk Factors Present on Admission                 ______________________________________________________________________    Interval History     .  Patient is new to me. Chart reviewed and events noted.  Patient seen and examined.   SLighty better today. Reports nasal congestion. No abd pain, chest pain. SOB better. No fever. Weakness+.         Data reviewed today: I reviewed all medications, new labs and imaging results over the last 24 hours.     Physical Exam   Vital Signs: Temp: 97.6  F (36.4  C) Temp src: Oral BP: (!) 149/69 Pulse: 66   Resp: 20 SpO2: 95 % O2 Device: Nasal cannula Oxygen Delivery: 5 LPM  Weight: 174 lbs 6.4 oz  General Appearance: Frail, NAD  Respiratory: Bilateral rhonchi and crackles heard more in the left lung.  Cardiovascular: RRR, trace edema  GI: Soft and nontender, no masses palpable   Other: Neurologically grossly intact without focal deficits.    Data   Recent Labs   Lab 01/08/22  1147 01/08/22  0805 01/08/22  0553 01/07/22  0901 01/07/22  0800 01/06/22  1745 01/06/22  0954   WBC  --   --  14.2*  --  14.2*  --  13.4*   HGB  --   --  9.5*  --  9.4*  --  9.7*    MCV  --   --  90  --  92  --  91   PLT  --   --  361  --  348  --  368   NA  --   --  143  --  142  --  143   POTASSIUM  --   --  3.9  --  4.2  --  4.1   CHLORIDE  --   --  105  --  104  --  106   CO2  --   --  29  --  28  --  27   BUN  --   --  35*  --  36*  --  29*   CR  --   --  1.95*  --  2.20*  --  1.92*   ANIONGAP  --   --  9  --  10  --  10   GELY  --   --  7.9*  --  8.1*  --  7.6*   * 152* 172*   < > 123   < > 139*    < > = values in this interval not displayed.     No results found for this or any previous visit (from the past 24 hour(s)).

## 2022-01-08 NOTE — PLAN OF CARE
Problem: Adult Inpatient Plan of Care  Goal: Plan of Care Review  Outcome: Improving     Problem: Gas Exchange Impaired  Goal: Optimal Gas Exchange  Outcome: Improving     VSS. Pt denies pain. Remains on baseline 5L. O2 levels are adequate. Receiving IV abx. IV lasix, urine output good.

## 2022-01-09 NOTE — PLAN OF CARE
Problem: Adult Inpatient Plan of Care  Goal: Plan of Care Review  Outcome: Improving   4L NC. SBA with cares and transfers. BG levels were 170 and 182. Insulin given as ordered.

## 2022-01-09 NOTE — PROGRESS NOTES
RESPIRATORY CARE NOTE   Patient is on 5 LPM NC, BS crackles, gave duoneb treatment x2, BS post treatment clear, patient perceives moderate improvement, patient tolerated treatment well.     Delroy Jones, RT

## 2022-01-09 NOTE — PROGRESS NOTES
[facility]    Medicine Progress Note - Hospitalist Service       Date of Admission:  1/6/2022    Assessment & Plan              76 year old male with past medical history of CHF, COPD with CRF, coronary artery disease, atrial fibrillation brought in to the emergency room department by EMS for further work-up and evaluation of hypoxia and worsening shortness of breath and cough. Found to have signs concerning for lobar pneumonia and influenza A     Acute non chronic hypoxic respiratory failure   ---On 5 L nasal cannula at baseline, at 5L today  ---Suspect multifactorial and mainly driven by respiratory infection, influenza, COPD but some concern for elevated BNP suggestive of CHF  ---Continue other treatments with diuretics, Tamiflu,steroids and nebs per below  ---Continuous oximetry  ---COVID negative (Vaccinated)      Influenza infection  -Chest x-ray reported as new focal airspace consolidation in the right lower lung consistent with new right-sided pneumonia.  -Not vaccinated for flu  --Droplet isolation  -Tamiflu. Discontinue abx as procal normal.  ---No spuum or blood cx ordered. Respiratory culture pending sample collection.     COPD exacerbation  -DuoNeb treatment 4 times daily  -Wean oxygen back down to baseline 5 L  -Methylprednisone 40 mg IV 3 times on admission, transitioned to oral prednisone now.   -Breo Ellipta daily     History of CHF  -Presacral edema, elevated BNP, but appears overall euvolemic so difficult to interpret  -Last echo was 60 %.    -Repeat ECHO EF 45-50%  -PTA carvedilol, torsemide      TIFFANIE on CKD- improving, likely pre-renal.   -Appears baseline 1.2-1.4  -Monitor input and output  -Avoid nephrotoxic medications  --Monitor closely while pt on his PTA torsemide     Coronary artery disease  -Denies having chest pain or palpitation.  -negative serial trops  -Status post CABG in the past  -Echocardiogram reviewed  -PTA atorvastatin, coreg     Diabetes mellitus type  2  -No diabetic medication is seen in home medication  -High resistance insulin sliding scale, monitor closely while pt on steroids  -A1c 6.3     Parkinson's disease  -PTA Sinemet     History of  atrial fibrillation  -PTA Xarelto and coreg  --monitor rate on tele       Diet: Combination Diet Regular Diet Adult; 2 gm NA Diet    DVT Prophylaxis: DOAC  Engle Catheter: Not present  Central Lines: None  Code Status: Full Code      Disposition Plan   Expected Discharge: 01/11/2022     Anticipated discharge location: 1-2 days  Barriers: IV meds, clinical improvement     The patient's care was discussed with the patient, RN, SW/CM    Pat Bonilla MD  Hospitalist Service  [facility])  Text page via [facility] Paging/Directory          ______________________________________________________________________    Interval History   Chart reviewed and events noted.  Patient seen and examined.   Reports more shortness of breath today. Denies any chest pain. Productive cough. Abd pain+, at site of insulin shots. D/w RN insulin being administered to his arms now.        Data reviewed today: I reviewed all medications, new labs and imaging results over the last 24 hours.     Physical Exam   Vital Signs: Temp: 97  F (36.1  C) Temp src: Oral BP: (!) 160/76 Pulse: 71   Resp: 20 SpO2: 96 % O2 Device: Oxymask Oxygen Delivery: 5 LPM  Weight: 174 lbs 6.4 oz  General Appearance: Frail, NAD  Respiratory:Diminsihed BS, no whezing  Cardiovascular: RRR, trace edema b/l LE  GI: Soft and nontender, no masses palpable   Other: Neurologically grossly intact without focal deficits.    Data   Recent Labs   Lab 01/09/22  0945 01/09/22  0836 01/08/22  2119 01/08/22  0805 01/08/22  0553 01/07/22  0901 01/07/22  0800   WBC 16.5*  --   --   --  14.2*  --  14.2*   HGB 9.8*  --   --   --  9.5*  --  9.4*   MCV 89  --   --   --  90  --  92     --   --   --  361  --  348     --   --   --  143  --  142   POTASSIUM 3.6  --   --    --  3.9  --  4.2   CHLORIDE 104  --   --   --  105  --  104   CO2 31  --   --   --  29  --  28   BUN 36*  --   --   --  35*  --  36*   CR 1.74*  --   --   --  1.95*  --  2.20*   ANIONGAP 7  --   --   --  9  --  10   GELY 8.4*  --   --   --  7.9*  --  8.1*   * 139* 182*   < > 172*   < > 123    < > = values in this interval not displayed.     No results found for this or any previous visit (from the past 24 hour(s)).

## 2022-01-09 NOTE — PLAN OF CARE
Problem: Gas Exchange Impaired  Goal: Optimal Gas Exchange  Outcome: Improving  Intervention: Optimize Oxygenation and Ventilation  Recent Flowsheet Documentation  Taken 1/9/2022 0100 by Jayashree Richardson RN  Head of Bed (HOB) Positioning: HOB at 30 degrees     Problem: Risk for Delirium  Goal: Improved Sleep  Outcome: Improving  Patient alert, oriented x 3. Pleasant and co-operative with cares. Denied discomfort. Noted with dyspnea with exertion. Complained of shortness of breath at 5 am, increased oxygen to 5 LPM via oxy mask, saturation 92-95 %, reported some improvement. Sat in the chair half of the night and went to bed early this morning.

## 2022-01-09 NOTE — PLAN OF CARE
Problem: Gas Exchange Impaired  Goal: Optimal Gas Exchange  Outcome: No Change   Pt in respiratory distress this AM.  Prn and scheduled inhalers given. O2 increased to 8L oxy mask.  Pt needing to be reassured and calmed down.  MD updated.  Scheduled nebulizers started.  Pt returned to 5L O2 via NC.    Ayesha Ray RN

## 2022-01-09 NOTE — PROGRESS NOTES
Care Management Follow Up    Length of Stay (days): 3    Expected Discharge Date: 01/11/2022     Concerns to be Addressed:     Medical progression  Patient plan of care discussed at interdisciplinary rounds: Yes    Anticipated Discharge Disposition:  Home      Anticipated Discharge Services:  None   Anticipated Discharge DME:  None     Patient/family educated on Medicare website which has current facility and service quality ratings:  Not applicable   Education Provided on the Discharge Plan:  Per treatment team   Patient/Family in Agreement with the Plan:  yes  Referrals Placed by CM/SW:  Not applicable   Private pay costs discussed: not applicable     Additional Information:    SW discussed updates with hospitalist.  CM following care progression to assist as needed with discharge planning.       JOEL Durán

## 2022-01-10 NOTE — PROGRESS NOTES
[facility]    Medicine Progress Note - Hospitalist Service       Date of Admission:  1/6/2022             76 year old male with past medical history of CHF, COPD with CRF, coronary artery disease, atrial fibrillation brought in to the emergency room department by EMS for further work-up and evaluation of hypoxia and worsening shortness of breath and cough. Found to have signs concerning for lobar pneumonia and influenza A     Acute non chronic hypoxic respiratory failure   ---On 5 L nasal cannula at baseline, at 5L today  ---Suspect multifactorial and mainly driven by respiratory infection, influenza, COPD but some concern for elevated BNP suggestive of CHF  --Repeat CXR on 1/10 shows worsening opacities.  ---Continue other treatments with diuretics, Tamiflu,steroids and nebs per below  ---Continuous oximetry  ---COVID negative (Vaccinated)      Influenza infection  -Chest x-ray reported as new focal airspace consolidation in the right lower lung consistent with new right-sided pneumonia.  -Not vaccinated for flu  -Droplet isolation  -Tamiflu. Discontinue abx as procal normal.  -No spuum or blood cx ordered. Respiratory culture pending sample collection.     COPD exacerbation  -DuoNeb treatment 4 times daily  -Wean oxygen back down to baseline 5 L  -Methylprednisone 40 mg IV 3 times on admission, transitioned to oral prednisone , last dose 1/11..   -Breo Ellipta daily     History of CHF  -Presacral edema, elevated BNP, but appears overall euvolemic so difficult to interpret  -Last echo was 60 %.    -Repeat ECHO EF 45-50%  -PTA carvedilol, torsemide      TIFFANIE on CKD- improving, likely pre-renal.   -Appears baseline 1.2-1.4  -Monitor input and output  -Avoid nephrotoxic medications  --Monitor closely while pt on his PTA torsemide     Coronary artery disease  -Denies having chest pain or palpitation.  -negative serial trops  -Status post CABG in the past  -Echocardiogram reviewed  -PTA  atorvastatin, coreg     Diabetes mellitus type 2  -No diabetic medication is seen in home medication  -High resistance insulin sliding scale, monitor closely while pt on steroids  -A1c 6.3     Parkinson's disease  -PTA Sinemet     History of  atrial fibrillation  -PTA Xarelto and coreg  --monitor rate on tele       Diet: Combination Diet Regular Diet Adult; 2 gm NA Diet    DVT Prophylaxis: DOAC  Engle Catheter: Not present  Central Lines: None  Code Status: Full Code      Disposition Plan     Anticipated discharge location: 1-2 days  Barriers: IV meds, clinical improvement, placement     The patient's care was discussed with the patient, RN, SW/CM    Pat Bonilla MD  Hospitalist Service  [facility])  Text page via [facility] Paging/Directory          ______________________________________________________________________    Interval History   Chart reviewed and events noted.  Patient seen and examined.   Patient appears tired this Am. Anxious overnight needed ativan. Some cough+. No fever.        Data reviewed today: I reviewed all medications, new labs and imaging results over the last 24 hours.     Physical Exam   Vital Signs: Temp: 98.2  F (36.8  C) Temp src: Oral BP: 117/58 Pulse: 76   Resp: 18 SpO2: 98 % O2 Device: Nasal cannula Oxygen Delivery: 5 LPM  Weight: 174 lbs 6.4 oz  General Appearance: Frail, NAD  Respiratory:Diminsihed BS, no whezing  Cardiovascular: RRR, trace edema b/l LE  GI: Soft and nontender, no masses palpable   Other: Neurologically grossly intact without focal deficits.    Data   Recent Labs   Lab 01/10/22  1250 01/10/22  0825 01/09/22  2121 01/09/22  1209 01/09/22  0945 01/08/22  0805 01/08/22  0553 01/07/22  0901 01/07/22  0800   WBC  --   --   --   --  16.5*  --  14.2*  --  14.2*   HGB  --   --   --   --  9.8*  --  9.5*  --  9.4*   MCV  --   --   --   --  89  --  90  --  92   PLT  --   --   --   --  352  --  361  --  348   NA  --   --   --   --  142  --  143  --  142    POTASSIUM  --   --   --   --  3.6  --  3.9  --  4.2   CHLORIDE  --   --   --   --  104  --  105  --  104   CO2  --   --   --   --  31  --  29  --  28   BUN  --   --   --   --  36*  --  35*  --  36*   CR  --   --   --   --  1.74*  --  1.95*  --  2.20*   ANIONGAP  --   --   --   --  7  --  9  --  10   GELY  --   --   --   --  8.4*  --  7.9*  --  8.1*   * 113* 134*   < > 141*   < > 172*   < > 123    < > = values in this interval not displayed.     Recent Results (from the past 24 hour(s))   XR Chest Port 1 View    Narrative    EXAM: XR CHEST PORT 1 VIEW  LOCATION: [facility]  DATE/TIME: 1/10/2022 9:36 AM    INDICATION: SOB  COMPARISON: 01/06/2022       Impression    IMPRESSION: Heterogeneous pulmonary opacities have slightly increased from the prior study. The cardiomediastinal silhouette is enlarged. Sternotomy suture wires are intact. Electrodes overlie the spinal canal.

## 2022-01-10 NOTE — TREATMENT PLAN
[facility]AT Treatment Plan    Patient Score: 10    Patient Acuity: 4    Clinical Indication for Therapy: COPD    Therapy Ordered: Combivent respimat QID     Assessment Summary: SpO2 95% on 5 lpm NC, BS course crackles with strong non-productive cough.       @[facility]@  1/10/2022

## 2022-01-10 NOTE — PLAN OF CARE
"  Problem: Gas Exchange Impaired  Goal: Optimal Gas Exchange  Outcome: No Change  Intervention: Optimize Oxygenation and Ventilation  Recent Flowsheet Documentation  Taken 1/10/2022 0101 by Jayashree Richardson RN  Head of Bed (HOB) Positioning: HOB at 30 degrees     Problem: Adult Inpatient Plan of Care  Goal: Optimal Comfort and Wellbeing  Outcome: No Change  Patient alert, oriented  3. Noted with anxiety and was yelling \" I cant breath\", administered Ativan prn and called RT to assess the patient. Administered Albuterol inhaler and activated VONDA machine. Noted calm and sleeping after a few minutes. Saturation 95 % 5 LPM.  "

## 2022-01-10 NOTE — PROVIDER NOTIFICATION
PROVIDER NOTIFIED: Dr. Martinez    METHOD: text page [id number]    REASON:anxious r/t influenza, pneumonia, and COPD. Had inhalers and nebs. Could we try Ativan?    OUTCOME: new order for Ativan PRN

## 2022-01-10 NOTE — PLAN OF CARE
Problem: Adult Inpatient Plan of Care  Goal: Plan of Care Review  Outcome: No Change   Albuterol neb given at 1928 for crackles and SOB. Some relief noted. Pt was panicky and anxious at 1930. Had scheduled Combivent inhaler, Albuterol inhaler given at 2209. No change. Page to Dr. Martinez, order obtained for Ativan PRN. Ativan given at 2241. Pt up in the chair. Relief pending.

## 2022-01-11 NOTE — PROGRESS NOTES
[facility]    Medicine Progress Note - Hospitalist Service       Date of Admission:  1/6/2022        76 year old male with past medical history of CHF, COPD with CRF, coronary artery disease, atrial fibrillation brought in to the emergency room department by EMS for further work-up and evaluation of hypoxia and worsening shortness of breath and cough. Found to have signs concerning for lobar pneumonia and influenza A, however procal neg X2 and not on abx anymore.     Acute on chronic hypoxic respiratory failure - resolved  -Suspect multifactorial and mainly driven by respiratory infection, influenza, COPD but some concern for elevated BNP suggestive of CHF  -On 5 L nasal cannula at home, at his baseline o2 today.   -Repeat CXR on 1/10 shows worsening opacities.  -Continue other treatments with diuretics, Tamiflu,steroids and nebs per below  -Continuous oximetry  -COVID negative (Vaccinated)      Influenza A infection  -Chest x-ray reported as new focal airspace consolidation in the right lower lung consistent with new right-sided pneumonia.Repeat CXR on 1/10 shows worsening opacities.procalcitonin neg X2, abx stopped now.   -Not vaccinated for flu, on droplet isolation  -Completing 5 days of  Tamiflu today  -No spuum or blood cx ordered on admission. Specimen for respiratory culture never got collected.      COPD exacerbation - resolved  -On albuterol, DuoNeb and Breo ellipta   -At his baseline home o2 requirement, 5L  -Completed total 5 days of steroids.   -Leukocytosis likely 2/2 to steroids, last dose today. Monitor.     History of CHF  -Presacral edema, elevated BNP on admission  -Last echo was 60 %.    -Repeat ECHO EF 45-50%  -PTA carvedilol, torsemide      TIFFANIE on CKD  -Appears baseline 1.2-1.4  -Monitor input and output  -Avoid nephrotoxic medications  -Monitor closely, creat slightly up today, will hold torsemide     Coronary artery disease  -Denies having chest pain or  palpitation.  -negative serial trops  -Status post CABG in the past  -Echocardiogram reviewed  -PTA atorvastatin, coreg     Diabetes mellitus type 2  -No diabetic medication is seen in home medication  -High resistance insulin sliding scale as pt was on steroids, last dose today  -A1c 6.3     Parkinson's disease  -PTA Sinemet     History of  atrial fibrillation  -PTA Coreg. Hold Xarelto  --monitor rate on tele    Anemia - unclear etiology  -Baseline Hb 9, Hb today noted to be 5.6.  -No signs of active bleeding. Hold xarelto  -Transfuse 1 U, monitor Hb serially.  -FOBT ordered    R thigh pain - chronic per patient  -Pt reports R thigh pain which is chronic, worsened in last few days. No injury.  -Xray R femur ordered.       Diet: Combination Diet Regular Diet Adult; 2 gm NA Diet    DVT Prophylaxis: DOAC  Engle Catheter: Not present  Central Lines: None  Code Status: Full Code      Disposition Plan     Anticipated discharge location: 1-2 days, to TCU on discharge  Barriers: Clinical improvement, placement, transfusion     The patient's care was discussed with the patient, RN, SW/CM    Pat Bonilla MD  Hospitalist Service  [facility])  Text page via [facility] Paging/Directory          ______________________________________________________________________    Interval History   Chart reviewed and events noted.  Patient seen and examined.   Patient appears tired this AM, falls asleep during my encounter but easy to arouse. States feels ok. Continues to have R thigh pain, posterior thigh. Denies any bleeding/no melena or hematochezia. No abd pain, N/V, fever. SOB at baseline.        Data reviewed today: I reviewed all medications, new labs and imaging results over the last 24 hours.     Physical Exam   Vital Signs: Temp: 97.5  F (36.4  C) Temp src: Axillary BP: 113/55 Pulse: 89   Resp: 20 SpO2: 97 % O2 Device: Nasal cannula Oxygen Delivery: 5 LPM  Weight: 174 lbs 6.4 oz  General Appearance: Frail,  elderly male in NAD  Respiratory:Diminsihed BS, no whezing  Cardiovascular: RRR, trace edema b/l LE  GI: Soft and nontender, no masses palpable   Ext: R thigh TTP in posterior side.  Other: Neurologically grossly intact without focal deficits.    Data   Recent Labs   Lab 01/11/22  0827 01/11/22  0624 01/10/22  2109 01/09/22  1209 01/09/22  0945 01/08/22  0805 01/08/22  0553   WBC  --  18.4*  --   --  16.5*  --  14.2*   HGB  --  5.7*  --   --  9.8*  --  9.5*   MCV  --  90  --   --  89  --  90   PLT  --  223  --   --  352  --  361   NA  --  144  --   --  142  --  143   POTASSIUM  --  3.6  --   --  3.6  --  3.9   CHLORIDE  --  104  --   --  104  --  105   CO2  --  30  --   --  31  --  29   BUN  --  39*  --   --  36*  --  35*   CR  --  2.09*  --   --  1.74*  --  1.95*   ANIONGAP  --  10  --   --  7  --  9   GELY  --  7.7*  --   --  8.4*  --  7.9*   * 130* 204*   < > 141*   < > 172*    < > = values in this interval not displayed.     Recent Results (from the past 24 hour(s))   XR Chest Port 1 View    Narrative    EXAM: XR CHEST PORT 1 VIEW  LOCATION: [facility]  DATE/TIME: 1/10/2022 9:36 AM    INDICATION: SOB  COMPARISON: 01/06/2022       Impression    IMPRESSION: Heterogeneous pulmonary opacities have slightly increased from the prior study. The cardiomediastinal silhouette is enlarged. Sternotomy suture wires are intact. Electrodes overlie the spinal canal.

## 2022-01-11 NOTE — PROGRESS NOTES
"Care Management Follow Up    Length of Stay (days): 5    Expected Discharge Date: 01/12/2022     Concerns to be Addressed:     Medical mgmt of flu  Patient plan of care discussed at interdisciplinary rounds: Yes    Anticipated Discharge Disposition:  Home w/HC vs. TCU     Anticipated Discharge Services:  Therapy   Anticipated Discharge DME:      Patient/family educated on Medicare website which has current facility and service quality ratings:  yes  Education Provided on the Discharge Plan:  yes  Patient/Family in Agreement with the Plan:      Referrals Placed by CM/SW:  TCU  Private pay costs discussed: transportation costs    Additional Information:  LIVIACM met with pt in room to discuss discharge planning. Pt shared concerns about wife being home alone sick alson and needing to get portable O2. Pt previously has been to [facility] for femur fracture. States, \"you get therapy 30 minutes a day and just lay there the rest of the time.\" Parties discussed benefits of TCU vs. Home care (\"but then you'd be home\") and limited caregiving at home. Referrals sent, [facility] may not be accepting pt's due to covid outbreak. Possible bed at [facility], if pt agreeable.  Pt to consider TCU or home care with spouse this evening. CM following.       CHEMA Aceves        "

## 2022-01-11 NOTE — PLAN OF CARE
Problem: Adult Inpatient Plan of Care  Goal: Plan of Care Review  Outcome: No Change     Problem: Gas Exchange Impaired  Goal: Optimal Gas Exchange  Outcome: No Change     Problem: Pain Chronic (Persistent) (Comorbidity Management)  Goal: Acceptable Pain Control and Functional Ability  Outcome: No Change     Patient reports right thigh/groin pain.  PRN dilaudid PO given and pt was sleeping at reassessment.  Continues to be on 5L of oxygen, baseline.  Lung sounds with slight expiratory wheezes but patient denies shortness of breath or any difficulty breathing.

## 2022-01-11 NOTE — PLAN OF CARE
Problem: Anxiety  Goal: Anxiety Reduction or Resolution  Outcome: Improving   As needed ativan given for anxiety.       Problem: Pain Chronic (Persistent) (Comorbidity Management)  Goal: Acceptable Pain Control and Functional Ability  Outcome: Improving  Intervention: Develop Pain Management Plan  Recent Flowsheet Documentation  Taken 1/10/2022 2124 by Tonia Nascimento RN  Pain Management Interventions: medication (see MAR)  Intervention: Manage Persistent Pain  Recent Flowsheet Documentation  Taken 1/10/2022 2100 by Tonia Nascimento RN  Bowel Elimination Promotion:   adequate fluid intake promoted   ambulation promoted  Pt. Has neurostimulater to help with chronic pain. As needed tylenol given for right hip/thigh pain. Pt. States he had a right femur fracture in the past. Pt. Able to get up with assist of one and a walker to the toilet.     Tonia Nascimento RN

## 2022-01-11 NOTE — PLAN OF CARE
Pt had low hemoglobin 1 unit PRBC tranfused recheck at 1700.  Sepsis protocol fired and blood was running so lactic will be drawn with hgb.  Pt was asymptomatic.  BP cuff on his machine was too large for patient's arm and reading low bp's switched to small bp cuff.  Pt eating and drinking well.  Voiding in urinal.  Pt went to x ray this am for femur, dilaudid and tylenol given this am for pain in right leg.  Pain was controlled with repositioning this afternoon.  Pt is sleeping.  Oxygen was out of his nose for a short period sats went down to 70% on RA.  Pt came back up quickly and is 95% on 5L NC, his baseline oxygen used at home.

## 2022-01-11 NOTE — PROGRESS NOTES
9:00AM - CHW received delegation from LIVIA Harvey CM, to follow up on TCU referrals.    [facility] - Left message on admissions voicemail to call back to [phone].    Updated destination comments on Care Management tab.    JUDY Guallpa  Inpatient Community Health Worker  [facility], P2

## 2022-01-11 NOTE — PROGRESS NOTES
Per TCU, [facility] is reviewing there policy for accepting Influenza A patients. Anticipate pt will need to be off tamiflu before admission; Earliest facility could admit pt would be tomorrow 1/12.    MITA left VM for pt on his cell phone.

## 2022-01-12 NOTE — PROGRESS NOTES
Care Management Follow Up    Length of Stay (days): 6    Expected Discharge Date: 01/13/2022     Concerns to be Addressed:     Medical mgmt   Patient plan of care discussed at interdisciplinary rounds: Yes    Anticipated Discharge Disposition:  TCU vs. Home/HC - pt remains undecided     Anticipated Discharge Services:    Anticipated Discharge DME:      Patient/family educated on Medicare website which has current facility and service quality ratings:  yes  Education Provided on the Discharge Plan:  Yes, ongoing  Patient/Family in Agreement with the Plan:  Ongoing discussions    Referrals Placed by CM/SW:    Private pay costs discussed: transportation costs    Additional Information:  Facility can accept pt for 11 AM discharge 1/13.    [facility] called out to spouse to find name of home care agency. Spouse will look for it and asks [facility] to return call tomorrow. Spouse states that she does also has O2 needs and may not be able to care for pt needs at home. Advises TCU stay for pt if it's just a few weeks. Previously pt was in for 8-12 weeks. She will talk to patient about TCU and encourage a TCU stay.     Pt declines TCU stay. Says that he's been there before and doesn't want to go. Says he has enough support at home and is willing to have home care again. Asks [facility] to call to spouse for name of home care agency.       Need f/u with patient to discuss TCU vs. Home/HC. Spouse unable to transport due to not having portable O2 and having flu.       [facility] spoke with [facility] - would need a new covid test before able to accept and spouse email. Would have private room and private shower, no additional fee. Question about abdominal bruising. Informed admissions that MD is getting a scan of femur and pt will not discharge until tomorrow. Insurance update has been sent in preparation.       CATHRYN AcevesSW

## 2022-01-12 NOTE — PROGRESS NOTES
[facility]    PROGRESS NOTE - Hospitalist Service    Assessment and Plan  Patient is new to me, Atif Curiel is a 76 year old male with past medical history of CHF, COPD with CRF, coronary artery disease, atrial fibrillation brought in to the emergency room department by EMS for further work-up and evaluation of hypoxia and worsening shortness of breath and cough. Found to have signs concerning for lobar pneumonia and positive influenza A.  He was admitted with     Acute on chronic hypoxic respiratory failure   - resolved  - Suspect multifactorial and mainly driven by respiratory infection, influenza, COPD but some concern for elevated BNP suggestive of CHF  - On 5 L nasal cannula at home, at his baseline o2 today.   - Repeat CXR on 1/10 shows worsening opacities.  - Continue other treatments with diuretics, Tamiflu,steroids and nebs per below  - Continuous oximetry  - COVID negative (Vaccinated)        Acute influenza A infection/pneumonia  -Chest x-ray reported as new focal airspace consolidation in the right lower lung consistent with new right-sided pneumonia.Repeat CXR on 1/10 shows worsening opacities.procalcitonin neg X2, abx stopped now.   - Not vaccinated for flu, on droplet isolation  - Completing 5 days of  Tamiflu today  - No spuum or blood cx ordered on admission.   - Specimen for respiratory culture never got collected.     Acute blood loss anemia   - Probably secondary to hematoma.  - Baseline Hb 9, Hb today noted to be 5.6.  - Patient has extensive bruises on her thigh and abdominal wall  - S/P Transfuse 1 U, monitor Hb serially.  - Continue to hold Xarelto  - CT femur shows hematoma in her thigh with concern of rectus sheath hematoma to  - Check CT abdomen     COPD exacerbation   - resolved  -On albuterol, DuoNeb and Breo ellipta   - At his baseline home o2 requirement, 5L  - Completed total 5 days of steroids.   - Leukocytosis likely 2/2 to steroids, last dose today.  Monitor.     History of CHF  -Presacral edema, elevated BNP on admission  -Last echo was 60 %.    -Repeat ECHO EF 45-50%  -Resume home carvedilol, torsemide      TIFFANIE on CKD  - Appears baseline 1.5-1.6  - Monitor input and output  - Avoid nephrotoxic medications  - Monitor closely, creat slightly up today, will hold torsemide     Coronary artery disease  -Denies having chest pain or palpitation.  - negative serial trops  - Status post CABG in the past  - Echocardiogram reviewed  - PTA atorvastatin, coreg     Diabetes mellitus type 2  - No diabetic medication is seen in home medication  - High resistance insulin sliding scale as pt was on steroids, last dose today  - Last hemoglobin A1c 6.3     Parkinson's disease  - PTA Sinemet     History of  atrial fibrillation  - PTA Coreg. Hold Xarelto  --monitor rate on tele     R thigh pain - chronic per patient  - Pt reports R thigh pain which is chronic, worsened in last few days. No injury.  - Xray R femur is negative for fracture  - CT femur shows hematoma    Leukocytosis  - Probably secondary to steroid  - No signs symptoms of infection  - Continue to monitor CBC         Active Problems:    Influenza A    Hypoxia    COPD exacerbation (H)    Pneumonia of right lower lobe due to infectious organism      VTE prophylaxis:  Pneumatic Compression Devices  DIET: Orders Placed This Encounter      Combination Diet Regular Diet Adult; 2 gm NA Diet      Disposition/Barriers to discharge: Monitor hemoglobin, 680  Code Status: Full Code    Subjective:  Atif is feeling better today, still has pain in his right thigh.  Denies any fever or chills overnight.    PHYSICAL EXAM  Vitals:    01/06/22 0938 01/07/22 0827 01/12/22 0113   Weight: 74.8 kg (165 lb) 79.1 kg (174 lb 6.4 oz) 74.1 kg (163 lb 4.8 oz)     B/P:100/52 T:97.5 P:77 R:22     Intake/Output Summary (Last 24 hours) at 1/12/2022 1531  Last data filed at 1/12/2022 1300  Gross per 24 hour   Intake 1114 ml   Output 825 ml   Net 289  ml      Body mass index is 25.58 kg/m .    Constitutional: awake, alert, cooperative, no apparent distress, and appears stated age  Eyes: Lids and lashes normal, pupils equal, round and reactive to light, extra ocular muscles intact, sclera clear, conjunctiva normal  ENT: Normocephalic, without obvious abnormality, atraumatic, sinuses nontender on palpation, external ears without lesions, oral pharynx with moist mucous membranes, tonsils without erythema or exudates, gums normal and good dentition.  Respiratory: No increased work of breathing, good air exchange, clear to auscultation bilaterally, no crackles or wheezing  Cardiovascular: Normal apical impulse, regular rate and rhythm, normal S1 and S2, no S3 or S4, and no murmur noted  GI: No scars, normal bowel sounds, soft, non-distended, non-tender, no masses palpated, no hepatosplenomegally  Skin: no bruising or bleeding and normal skin color, texture, turgor  Musculoskeletal: There is no redness, warmth, or swelling of the joints.  Full range of motion noted.  no lower extremity pitting edema present  Neurologic: Awake, alert, oriented to name, place and time.  Cranial nerves II-XII are grossly intact.  Motor is 5 out of 5 bilaterally.   Sensory is intact.    Neuropsychiatric: Appropriate with examiner      PERTINENT LABS/IMAGING:  Recent Labs   Lab 01/12/22  1133 01/12/22  0814 01/12/22  0622 01/11/22 2011 01/11/22  1706 01/11/22  1208 01/11/22  0900 01/11/22  0827 01/11/22  0624 01/09/22  1209 01/09/22  0945   WBC  --   --  19.1*  --   --   --   --   --  18.4*  --  16.5*   HGB  --   --  7.7*  --  6.4*  --  5.6*  --  5.7*  --  9.8*   MCV  --   --  104*  --   --   --   --   --  90  --  89   PLT  --   --  227  --   --   --   --   --  223  --  352   NA  --   --  139  --   --   --   --   --  144  --  142   POTASSIUM  --   --  4.1  --   --   --   --   --  3.6  --  3.6   CHLORIDE  --   --  101  --   --   --   --   --  104  --  104   CO2  --   --  30  --   --   --    --   --  30  --  31   BUN  --   --  39*  --   --   --   --   --  39*  --  36*   CR  --   --  2.05*  --   --   --   --   --  2.09*  --  1.74*   ANIONGAP  --   --  8  --   --   --   --   --  10  --  7   GELY  --   --  7.8*  --   --   --   --   --  7.7*  --  8.4*   * 138* 156*   < >  --    < >  --    < > 130*   < > 141*    < > = values in this interval not displayed.     Recent Results (from the past 24 hour(s))   CT Femur Thigh Right w/o Contrast    Narrative    EXAM: CT FEMUR THIGH RIGHT WITHOUT CONTRAST  LOCATION: [facility]  DATE/TIME: 1/12/2022 1:18 PM    INDICATION: Chronic right-sided pain, recently worsening. No injury. Anticoagulated, and hemoglobin drop. Suspected spontaneous hemorrhage.    COMPARISON: None.    TECHNIQUE: Noncontrast. Axial, sagittal and coronal thin-section reconstruction. Dose reduction techniques were used.     FINDINGS: Plate and screw fixation across old, largely healed fracture of the distal femoral shaft. Knee arthroplasty. There are beam hardening artifacts from the orthopedic hardware which partially obscure the surrounding soft tissues. No   acute-appearing fracture. Sclerosis right inferior pubic ramus is nonspecific.    There is swelling of the right sartorius and likely adductor longus and gracilis muscles in the proximal right thigh. There is heterogeneity and increased density in the swollen muscles which is probably accentuated by metallic artifacts from the   orthopedic hardware. There is diffuse subcutaneous soft tissue stranding involving the right thigh.    There is likely a a left rectus sheath hematoma on the most superior images, incompletely imaged. Small amount of free fluid in the pelvis.      Impression    IMPRESSION:  1.  Swelling involving the right sartorius and likely the adductor longus and gracilis muscles in the proximal right thigh is nonspecific and could represent hemorrhage.  2.  Diffuse subcutaneous soft tissue stranding  involving the right thigh suggesting edema.  3.  Probable left rectus sheath hematoma, incompletely imaged. Recommend CT abdomen pelvis for more complete evaluation.  4.  Nonspecific and indeterminate sclerosis of the right inferior pubic ramus. Sclerotic metastasis is possible. MRI or radionuclide bone scan could assess further.         Discussed with patient, family, nursing staff and discharge planner  Total time spent on discussion of rectus sheath hematoma monitoring hemoglobin is more than 35 minutes    Tyson Devries MD  [facility] Medicine Service  [phone]

## 2022-01-12 NOTE — PLAN OF CARE
Problem: Adult Inpatient Plan of Care  Goal: Absence of Hospital-Acquired Illness or Injury  Intervention: Prevent Skin Injury  Recent Flowsheet Documentation  Taken 1/12/2022 0113 by Flakita Leiva RN  Body Position: position changed independently     Problem: Risk for Delirium  Goal: Improved Sleep  Outcome: No Change     Problem: Gas Exchange Impaired  Goal: Optimal Gas Exchange  Outcome: No Change     Problem: Anemia  Goal: Anemia Symptom Improvement  Outcome: No Change     Patient pain managed with prn po dilaudid tonight.  Pain in right groin/hip.  Noted bruising around patient umbilicus and left flank, had it yesterday, too.  Denies pain there.  Hemoglobin recheck this am was 7.7.  Per pt, he has a hard time sleeping at night.  Melatonin given and pt was sleeping at recheck.

## 2022-01-12 NOTE — PLAN OF CARE
Problem: Pain Chronic (Persistent) (Comorbidity Management)  Goal: Acceptable Pain Control and Functional Ability  Outcome: Improving  Intervention: Develop Pain Management Plan  Recent Flowsheet Documentation  Taken 1/11/2022 2038 by Tonia Nascimento, RN  Pain Management Interventions: medication (see MAR)   As needed tylenol given for right hip/groin pain. Pt. Sleeping after administration. Bruising has spread in right groin area and left flank and abdominal, note left for MD.     Pt. On 2nd unit of blood. Hemoglobin checks every 12 hours. Pt. Has not had a bowel movement, waiting to send stool sample. As needed senna given.     Tonia Nascimento, RN

## 2022-01-13 NOTE — PLAN OF CARE
Problem: Gas Exchange Impaired  Goal: Optimal Gas Exchange  Outcome: No Change  Intervention: Optimize Oxygenation and Ventilation  Recent Flowsheet Documentation  Taken 1/13/2022 0140 by Mando Carreno RN  Head of Bed (HOB) Positioning: HOB at 20-30 degrees     Problem: Respiratory Compromise COPD (Chronic Obstructive Pulmonary Disease)  Goal: Effective Oxygenation and Ventilation  Outcome: No Change  Intervention: Promote Airway Secretion Clearance  Recent Flowsheet Documentation  Taken 1/13/2022 0140 by Mando Carreno, RN  Activity Management: activity adjusted per tolerance  Taken 1/13/2022 0100 by Mando Carreno, RN  Cough And Deep Breathing: done independently per patient  Intervention: Optimize Oxygenation and Ventilation  Recent Flowsheet Documentation  Taken 1/13/2022 0140 by Mando Carreno, RN  Head of Bed (HOB) Positioning: HOB at 20-30 degrees    Pt continued on 5ltrs O2 via NC w/ SpO2 ranging 93-96%. Became SOB and anxious x1. Anxiousness went unresolved w/ non pharmacological interventions. PRN Albuterol inhaler administered along w/ PRN PO Lorazepam. LS: diminished throughout, slight rhonchi throughout. Coughing/deep breathing exercises completed. Nasal congestion noted, PRN Brainards Spray administered to each nostril. A&Ox4. C/O pain to R thigh/groin where noted bruising is. Circulation to [facility]. MD aware. Awaiting for Pt to have a BM so staff can obtain a stool sample.

## 2022-01-13 NOTE — PLAN OF CARE
Neuro: Alert and oriented.    Respiratory: Lung sounds congested in lower lobes from posterior assessment. Remains on 5L per baseline. Respiratory pattern shallow with exertion. Infrequent nonproductive cough following scheduled inhalers.    Cardiac: Heart rate irregular.    GI: No bowel movement. Passing flatulence. Stool sample not yet collected.    : Incontinent of urine at times.    Mobility: Pain in right leg/hip impairing mobility. Assist x1 with walker.    Skin: Dark purple bruise surrounding umbilicus. Diffuse bruising and swelling on right abdomen as well as groin.    Nutrition: Patient eats % of all meals.    Hemoglobin 7.7 following 2 units of blood yesterday. Previous values 9.8 on 1/9 and 5.7 on 1/11.

## 2022-01-13 NOTE — PROGRESS NOTES
Care Management Follow Up    Length of Stay (days): 7    Expected Discharge Date: 01/14/2022     Concerns to be Addressed:     Medical mgmt   Patient plan of care discussed at interdisciplinary rounds: Yes    Anticipated Discharge Disposition:  TCU      Anticipated Discharge Services:    Anticipated Discharge DME:      Patient/family educated on Medicare website which has current facility and service quality ratings:  yes  Education Provided on the Discharge Plan:  Yes, ongoing  Patient/Family in Agreement with the Plan:  Ongoing discussions    Referrals Placed by CM/SW:    Private pay costs discussed: transportation costs    Additional Information:  [facility] may not be able to accept pt tomorrow due to COVID in staff. Follow-up with [facility] before discharge.        Per MD, pt will not discharge today. Source of bleeding found and getting blood today. Likely discharge tomorrow.    MITA left VM with [facility] with an update about pt status and that wheelchair ride w/O2 has been moved to 1/14 at 11 AM. Awaiting facility confirmation that they can accept pt tomorrow.    CM following.     CATHRYN AcevesSW

## 2022-01-13 NOTE — PROGRESS NOTES
[facility]    PROGRESS NOTE - Hospitalist Service    Assessment and Plan  76 year old male with past medical history of CHF, COPD with CRF, coronary artery disease, atrial fibrillation brought in to the emergency room department by EMS for further work-up and evaluation of hypoxia and worsening shortness of breath and cough. Found to have signs concerning for lobar pneumonia and positive influenza A.  He was admitted with     Acute on chronic hypoxic respiratory failure   - resolved  - Suspect multifactorial and mainly driven by respiratory infection, influenza, COPD but some concern for elevated BNP suggestive of CHF  - On 5 L nasal cannula at home, at his baseline o2 today.   - Repeat CXR on 1/10 shows worsening opacities.  - Continue other treatments with diuretics, Tamiflu,steroids and nebs per below  - Continuous oximetry  - COVID negative (Vaccinated)        Acute influenza A infection/pneumonia  - Chest x-ray reported as new focal airspace consolidation in the right lower lung consistent with new right-sided pneumonia.Repeat CXR on 1/10 shows worsening opacities.procalcitonin neg X2, abx stopped now.   - Not vaccinated for flu, on droplet isolation  - Completed 5 days of  Tamiflu today  - No spuum or blood cx ordered on admission.   - Specimen for respiratory culture never got collected.      Acute blood loss anemia   - Probably secondary to hematoma.  - Baseline Hb 9, Hb today noted to be 5.6.  - Patient has extensive bruises on her thigh and abdominal wall  - S/P Transfuse 1 U, monitor Hb serially.  - Continue to hold Xarelto  - CT femur shows hematoma in her thigh with concern of rectus sheath hematoma   - CT abdomen shows rectus sheath hematoma  - Again hemoglobin dropped to 6.8 today 1/13/2022  - Transfuse another unit of blood and continue to monitor to     COPD exacerbation   - resolved  -On albuterol, DuoNeb and Breo ellipta   - At his baseline home o2 requirement, 5L  -  Completed total 5 days of steroids.   - Leukocytosis likely 2/2 to steroids, last dose today. Monitor.     History of CHF  -Presacral edema, elevated BNP on admission  -Last echo was 60 %.    -Repeat ECHO EF 45-50%  -Resume home carvedilol, torsemide      TIFFANIE on CKD  - Appears baseline 1.5-1.6  - Monitor input and output  - Avoid nephrotoxic medications  - Monitor closely, creat slightly up today,   - Restart torsemide as patient is receiving blood     Coronary artery disease  -Denies having chest pain or palpitation.  - negative serial trops  - Status post CABG in the past  - Echocardiogram reviewed  - PTA atorvastatin, coreg     Diabetes mellitus type 2  - No diabetic medication is seen in home medication  - High resistance insulin sliding scale as pt was on steroids, last dose today  - Last hemoglobin A1c 6.3     Parkinson's disease  - PTA Sinemet     History of  atrial fibrillation  - PTA Coreg. Hold Xarelto  --monitor rate on tele     R thigh pain - chronic per patient  - Pt reports R thigh pain which is chronic, worsened in last few days. No injury.  - Xray R femur is negative for fracture  - CT femur shows hematoma but no fracture     Leukocytosis  - Probably secondary to steroid versus reaction to hematoma  - No signs symptoms of infection  - Continue to monitor CBC       Active Problems:    Influenza A    Hypoxia    COPD exacerbation (H)    Pneumonia of right lower lobe due to infectious organism      VTE prophylaxis:  Pneumatic Compression Devices  DIET: Orders Placed This Encounter      Combination Diet Regular Diet Adult; 2 gm NA Diet      Disposition/Barriers to discharge: Blood transfusion, monitor CBC  Code Status: Full Code    Subjective:  Atif is feeling better today, still has some weakness.  Denies any chest pain or shortness of breath.    PHYSICAL EXAM  Vitals:    01/06/22 0938 01/07/22 0827 01/12/22 0113   Weight: 74.8 kg (165 lb) 79.1 kg (174 lb 6.4 oz) 74.1 kg (163 lb 4.8 oz)     B/P:121/59  T:97.6 P:63 R:18     Intake/Output Summary (Last 24 hours) at 1/13/2022 1506  Last data filed at 1/13/2022 1400  Gross per 24 hour   Intake 1403 ml   Output 1200 ml   Net 203 ml      Body mass index is 25.58 kg/m .    Constitutional: awake, alert, cooperative, no apparent distress, and appears stated age  Eyes: Lids and lashes normal, pupils equal, round and reactive to light, extra ocular muscles intact, sclera clear, conjunctiva normal  ENT: Normocephalic, without obvious abnormality, atraumatic, sinuses nontender on palpation, external ears without lesions, oral pharynx with moist mucous membranes, tonsils without erythema or exudates, gums normal and good dentition.  Respiratory: No increased work of breathing, good air exchange, clear to auscultation bilaterally, no crackles or wheezing  Cardiovascular: Normal apical impulse, regular rate and rhythm, normal S1 and S2, no S3 or S4, and no murmur noted  GI: No scars, normal bowel sounds, soft, non-distended, non-tender, no masses palpated, no hepatosplenomegally  Skin: Positive multiple bruises on anterior abdominal wall in the right hip.  Musculoskeletal: There is no redness, warmth, or swelling of the joints.  Full range of motion noted.  no lower extremity pitting edema present  Neurologic: Awake, alert, oriented to name, place and time.  Cranial nerves II-XII are grossly intact.  Motor is 5 out of 5 bilaterally.   Sensory is intact.    Neuropsychiatric: Appropriate with examiner      PERTINENT LABS/IMAGING:  Recent Labs   Lab 01/13/22  1201 01/13/22  0752 01/13/22  0607 01/12/22  0814 01/12/22  0622 01/11/22 2011 01/11/22  1706 01/11/22  0827 01/11/22  0624   WBC  --   --  18.7*  --  19.1*  --   --   --  18.4*   HGB  --   --  6.8*  --  7.7*  --  6.4*   < > 5.7*   MCV  --   --  94  --  104*  --   --   --  90   PLT  --   --  277  --  227  --   --   --  223   NA  --   --  140  --  139  --   --   --  144   POTASSIUM  --   --  4.3  --  4.1  --   --   --  3.6    CHLORIDE  --   --  103  --  101  --   --   --  104   CO2  --   --  30  --  30  --   --   --  30   BUN  --   --  35*  --  39*  --   --   --  39*   CR  --   --  1.67*  --  2.05*  --   --   --  2.09*   ANIONGAP  --   --  7  --  8  --   --   --  10   GELY  --   --  7.9*  --  7.8*  --   --   --  7.7*   * 111* 125   < > 156*   < >  --    < > 130*   ALBUMIN  --   --  2.5*  --   --   --   --   --   --    PROTTOTAL  --   --  5.3*  --   --   --   --   --   --    BILITOTAL  --   --  1.1*  --   --   --   --   --   --    ALKPHOS  --   --  160*  --   --   --   --   --   --    ALT  --   --  <9  --   --   --   --   --   --    AST  --   --  21  --   --   --   --   --   --     < > = values in this interval not displayed.     Recent Results (from the past 24 hour(s))   CT Abdomen Pelvis w/o Contrast    Narrative    EXAM: CT ABDOMEN PELVIS W/O CONTRAST  LOCATION: [facility]  DATE/TIME: 1/12/2022 4:45 PM    INDICATION: Hypoxia and shortness of breath. Influenza pneumonia. Acute anemia. Acute kidney injury.  COMPARISON: None.  TECHNIQUE: CT scan of the abdomen and pelvis was performed without IV contrast. Multiplanar reformats were obtained. Dose reduction techniques were used.  CONTRAST: None.    FINDINGS:   LOWER CHEST: There are multiple bands of dense linear peripheral opacity suggesting atelectasis or fibrosis at both lung bases. Tiny bilateral pleural effusions and mild cardiomegaly.    HEPATOBILIARY: Cholelithiasis. No suggestion for pericholecystic inflammation. Mild ascites surrounds liver.    PANCREAS: There is a vague 1.5 cm low-density focus within the inferior head of pancreas possibly relating to dilated distal CBD but more likely relating to a pancreatic cyst. Remainder of the gland unremarkable. No duct dilatation. Streak artifact at   this level from the implantable neurostimulator.    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    ADRENAL GLANDS: Normal.    KIDNEYS/BLADDER: There is  left-sided hydronephrosis and hydroureter with ureter dilated down to nearly the level of the UVJ but no definite obstructing mass or stone identified. Calcifications within left renal hilum favored to be vascular.    No right-sided stones or hydronephrosis. Slight vascular calcification and small renal cysts present.    Minimal diffuse bladder wall thickening. No bladder stones.    BOWEL: No obstruction or inflammatory change. Moderate stool burden throughout colon.    LYMPH NODES: Normal.    VASCULATURE: Moderate calcific atherosclerotic plaque.    PELVIC ORGANS: Normal.    MUSCULOSKELETAL: There are 2 discrete intramuscular hematomas evident, largest is within the inferior left rectus muscle with maximal dimension 5 x 5 cm on axial images; second focus of hemorrhage involves the visible proximal right quadriceps muscles.   There is also subcutaneous edema lateral to the right hip and proximal thigh and the left flank.    Partial visualization of orthopedic hardware involving right femoral neck and proximal femur. Instrumentation and fusion involving low lumbar spine. Prominent spinal degenerative changes.      Impression    IMPRESSION:   1.  Moderate left hydronephrosis/hydroureter, etiology uncertain with no definite stones or obstructing mass.  2.  Moderate size localized hematomas involving both the right quadriceps musculature and left inferior rectus muscle. Surrounding lateral subcutaneous soft tissue stranding may represent additional ecchymoses.  3.  Cholelithiasis.  4.  Very mild ascites. Trace volume bilateral pleural effusions. Mild cardiomegaly.  5.  Bands of linear discoid atelectasis or fibrosis at lung bases.  6.  Vague 1.5 cm cystic focus within pancreatic head favored to be a low-grade or benign but incompletely evaluated on this noncontrast study.         Discussed with patient, family, nursing staff and discharge planner    Tyson Devries MD  [facility]  [phone]

## 2022-01-13 NOTE — PLAN OF CARE
Neuro: Patient alert and oriented. Overall more drowsy compared to yesterday, sleeping on and off throughout the day.    Respiratory: Lung sounds course. Congested, non-productive cough.    GI: No documented bowel movement since 1/7. Administered new order for Miralax. Medium formed bowel movement today.    : incontinent of urine.    Mobility: Somewhat more guarded.    Pain: Patient rates pain 6/10, improved from yesterday's ratings. However, Patient is limited movement more and appears to be in more pain, grimacing with movement. Patient does not request pain medication, taking interventions only when offered.    Skin: Bruising all down right hip and left abdomen.    Hemoglobin 6.8. 1 unit PRBC administered per order.

## 2022-01-14 NOTE — PROGRESS NOTES
Care Management Follow Up    Length of Stay (days): 8    Expected Discharge Date: 01/17/2022  Expected Time of Departure: morning anticipated  Concerns to be Addressed:     TCU availability  Patient plan of care discussed at interdisciplinary rounds: Yes    Anticipated Discharge Disposition: Transitional Care     Anticipated Discharge Services:  TCU  Anticipated Discharge DME:  Per therapy if recommended    Patient/family educated on Medicare website which has current facility and service quality ratings:  yes  Education Provided on the Discharge Plan:  Patient, spouse (Shahla)  Patient/Family in Agreement with the Plan: yes    Referrals Placed by CM/SW:    Private pay costs discussed: Not applicable    Additional Information:  Writer spoke with Dhaval at [facility].  Facility is at capacity for staffing and has some concerns regarding hgb stability.    Dhaval states she would accept patient on Monday, 1/17, as long as he remains stable.  CM should contact [facility] first thing Monday morning.  Writer updated [facility] Provider and Nursing unit staff.  Writer called patient's spouse, Shahla, and provided update on discharge plan.  CM will continue to follow.      Gillian Kisney RN

## 2022-01-14 NOTE — PLAN OF CARE
Problem: Adult Inpatient Plan of Care  Goal: Optimal Comfort and Wellbeing  Outcome: No Change     Problem: Gas Exchange Impaired  Goal: Optimal Gas Exchange  Outcome: No Change  Intervention: Optimize Oxygenation and Ventilation  Recent Flowsheet Documentation  Taken 1/14/2022 0000 by Jayashree Richardson RN  Head of Bed (HOB) Positioning: HOB at 30 degrees     Problem: Pain Chronic (Persistent) (Comorbidity Management)  Goal: Acceptable Pain Control and Functional Ability  Outcome: No Change  Patient alert, oriented x 3. Incontinent of bowel and bladder. Needed extensive assistance with cares. Complained of dyspnea with exertion. 5 LPM nasal canula, saturation 99 %.

## 2022-01-14 NOTE — PROGRESS NOTES
[facility]    PROGRESS NOTE - Hospitalist Service    Assessment and Plan    76 year old male with past medical history of CHF, COPD with CRF, coronary artery disease, atrial fibrillation brought in to the emergency room department by EMS for further work-up and evaluation of hypoxia and worsening shortness of breath and cough. Found to have signs concerning for lobar pneumonia and positive influenza A.  He was admitted with     Acute on chronic hypoxic respiratory failure   - resolved  - Suspect multifactorial and mainly driven by respiratory infection, influenza, COPD but some concern for elevated BNP suggestive of CHF  - On 5 L nasal cannula at home, at his baseline o2 today.   - Repeat CXR on 1/10 shows worsening opacities.  - Continue other treatments with diuretics, Tamiflu,steroids and nebs per below  - Continuous oximetry  - COVID negative (Vaccinated)  - Back to his baseline oxygen 5 L     Acute influenza A infection/pneumonia  - Chest x-ray reported as new focal airspace consolidation in the right lower lung consistent with new right-sided pneumonia.Repeat CXR on 1/10 shows worsening opacities.procalcitonin neg X2, abx stopped now.   - Not vaccinated for flu, on droplet isolation  - Completed 5 days of  Tamiflu today  - No spuum or blood cx ordered on admission.   - Specimen for respiratory culture never got collected.      Acute blood loss anemia   - Probably secondary to hematoma.  - Baseline Hb 9, Hb today noted to be 5.6.  - Patient has extensive bruises on her thigh and abdominal wall  - S/P Transfuse 1 U, monitor Hb serially.  - Continue to hold Xarelto  - CT femur shows hematoma in her thigh with concern of rectus sheath hematoma   - CT abdomen shows rectus sheath hematoma  - Again hemoglobin dropped to 6.8 today 1/13/2022  - Transfuse another unit of blood on 1/13/2022   - Continue to monitor CBC     COPD exacerbation   - resolved  - On albuterol, DuoNeb and Breo ellipta   -  At his baseline home o2 requirement, 5L  - Completed total 5 days of steroids.   - Leukocytosis likely 2/2 to steroids, last dose today. Monitor.     History of CHF  - Presacral edema, elevated BNP on admission  - Last echo was 60 %.    -Repeat ECHO EF 45-50%  - Resume home carvedilol, torsemide      TIFFANIE on CKD  - Appears baseline 1.5-1.6  - Monitor input and output  - Avoid nephrotoxic medications  - Monitor closely, creat slightly up today,   - Restart torsemide as patient is receiving blood     Coronary artery disease  - Denies having chest pain or palpitation.  - negative serial trops  - Status post CABG in the past  - Echocardiogram reviewed  - PTA atorvastatin, coreg     Diabetes mellitus type 2  - No diabetic medication is seen in home medication  - High resistance insulin sliding scale as pt was on steroids, last dose today  - Last hemoglobin A1c 6.3     Parkinson's disease  - PTA Sinemet     History of  atrial fibrillation  - PTA Coreg. Hold Xarelto  --monitor rate on tele     R thigh pain - chronic per patient  - Pt reports R thigh pain which is chronic, worsened in last few days. No injury.  - Xray R femur is negative for fracture  - CT femur shows hematoma but no fracture     Leukocytosis  - Probably secondary to steroid versus reaction to hematoma  - No signs symptoms of infection  -  Improving  - Continue to monitor CBC    Weakness and deconditioning  - Secondary to above  - PT/OT evaluation  - Pending TCU discharge          Active Problems:    Influenza A    Hypoxia    COPD exacerbation (H)    Pneumonia of right lower lobe due to infectious organism      VTE prophylaxis:  Pneumatic Compression Devices  DIET: Orders Placed This Encounter      Combination Diet Regular Diet Adult; 2 gm NA Diet      Disposition/Barriers to discharge: Pending TCU acceptance  Code Status: Full Code    Subjective:  Atif is feeling about the same today, denies any chest pain or shortness of breath.  Still has mild pain in  his right hip.    PHYSICAL EXAM  Vitals:    01/06/22 0938 01/07/22 0827 01/12/22 0113   Weight: 74.8 kg (165 lb) 79.1 kg (174 lb 6.4 oz) 74.1 kg (163 lb 4.8 oz)     B/P:98/51 T:98.1 P:64 R:16     Intake/Output Summary (Last 24 hours) at 1/14/2022 1603  Last data filed at 1/13/2022 1933  Gross per 24 hour   Intake --   Output 575 ml   Net -575 ml      Body mass index is 25.58 kg/m .    Constitutional: awake, alert, cooperative, no apparent distress, and appears stated age  Eyes: Lids and lashes normal, pupils equal, round and reactive to light, extra ocular muscles intact, sclera clear, conjunctiva normal  ENT: Normocephalic, without obvious abnormality, atraumatic, sinuses nontender on palpation, external ears without lesions, oral pharynx with moist mucous membranes, tonsils without erythema or exudates, gums normal and good dentition.  Respiratory: No increased work of breathing, good air exchange, clear to auscultation bilaterally, no crackles or wheezing  Cardiovascular: Normal apical impulse, regular rate and rhythm, normal S1 and S2, no S3 or S4, and no murmur noted  GI: No scars, normal bowel sounds, soft, non-distended, non-tender, no masses palpated, no hepatosplenomegally  Skin: no bruising or bleeding and normal skin color, texture, turgor  Musculoskeletal: Improving bruises on right hip.  No edema.  no lower extremity pitting edema present  Neurologic: Awake, alert, oriented to name, place and time.  Cranial nerves II-XII are grossly intact.  Motor is 5 out of 5 bilaterally.   Sensory is intact.    Neuropsychiatric: Appropriate with examiner      PERTINENT LABS/IMAGING:  Recent Labs   Lab 01/14/22  1230 01/14/22  0830 01/14/22  0624 01/13/22  0752 01/13/22  0607 01/12/22  0814 01/12/22  0622   WBC  --   --  16.1*  --  18.7*  --  19.1*   HGB  --   --  7.4*  --  6.8*  --  7.7*   MCV  --   --  94  --  94  --  104*   PLT  --   --  309  --  277  --  227   NA  --   --  141  --  140  --  139   POTASSIUM  --    --  4.2  --  4.3  --  4.1   CHLORIDE  --   --  105  --  103  --  101   CO2  --   --  30  --  30  --  30   BUN  --   --  35*  --  35*  --  39*   CR  --   --  1.52*  --  1.67*  --  2.05*   ANIONGAP  --   --  6  --  7  --  8   GELY  --   --  7.8*  --  7.9*  --  7.8*   * 116* 117   < > 125   < > 156*   ALBUMIN  --   --  2.5*  --  2.5*  --   --    PROTTOTAL  --   --  5.3*  --  5.3*  --   --    BILITOTAL  --   --  1.7*  --  1.1*  --   --    ALKPHOS  --   --  162*  --  160*  --   --    ALT  --   --  <9  --  <9  --   --    AST  --   --  16  --  21  --   --     < > = values in this interval not displayed.     No results found for this or any previous visit (from the past 24 hour(s)).    Discussed with patient, family, nursing staff and discharge planner    Tyson Devries MD  [facility]  [phone]

## 2022-01-14 NOTE — PLAN OF CARE
"./59 (BP Location: Right arm)   Pulse 65   Temp 98.8  F (37.1  C) (Oral)   Resp 12   Ht 1.702 m (5' 7\")   Wt 74.1 kg (163 lb 4.8 oz)   SpO2 97%   BMI 25.58 kg/m    Patient complained of an 8 out of 10 tylenol given MD notified. MD assessed patient put orders for tylenol now scheduled and ice therapy on groin site. No signs of anxiety noted. Continue to monitor.Nelly Dudley RN     Problem: Anxiety  Goal: Anxiety Reduction or Resolution  Outcome: Improving     Problem: Pain Acute  Goal: Acceptable Pain Control and Functional Ability  Outcome: Improving       "

## 2022-01-14 NOTE — PROGRESS NOTES
RNCM called and left voice message for [facility] to confirm patient scheduled admission for today.  Patient is currently scheduled for 11:00 AM [facility] wheelchair transport with oxygen.  Awaiting return call.    Gillian Kinsey RN, Care Manager

## 2022-01-15 NOTE — PLAN OF CARE
"./52 (BP Location: Right arm)   Pulse 68   Temp 97.7  F (36.5  C) (Oral)   Resp 14   Ht 1.702 m (5' 7\")   Wt 74.1 kg (163 lb 4.8 oz)   SpO2 98%   BMI 25.58 kg/m    Patient continues to complained of pain. Tylenol scheduled, Ice therapy and Dilaudid given, results a little helpful. Pain down to 4. Continue to monitor.Nelly Dudley RN    Problem: Pain Chronic (Persistent) (Comorbidity Management)  Goal: Acceptable Pain Control and Functional Ability  Outcome: Improving  Intervention: Manage Persistent Pain  Recent Flowsheet Documentation  Taken 1/15/2022 0800 by Nelly Jackson, RN  Bowel Elimination Promotion: adequate fluid intake promoted  Medication Review/Management: medications reviewed     "

## 2022-01-15 NOTE — PLAN OF CARE
Problem: Gas Exchange Impaired  Goal: Optimal Gas Exchange  Outcome: Improving  Intervention: Optimize Oxygenation and Ventilation  Flowsheets (Taken 1/15/2022 0439)  Airway/Ventilation Management:   airway patency maintained   calming measures promoted   pulmonary hygiene promoted  Head of Bed (HOB) Positioning: HOB at 45 degrees     Problem: Respiratory Compromise COPD (Chronic Obstructive Pulmonary Disease)  Goal: Effective Oxygenation and Ventilation  Outcome: Improving  Intervention: Promote Airway Secretion Clearance  Flowsheets  Taken 1/15/2022 0439  Breathing Techniques/Airway Clearance:   deep/controlled cough encouraged   pursed-lip breathing encouraged  Cough And Deep Breathing: done independently per patient  Activity Management:   activity encouraged   activity adjusted per tolerance  Taken 1/15/2022 0017  Cough And Deep Breathing: done with encouragement  Activity Management: up in chair  Intervention: Optimize Oxygenation and Ventilation  Flowsheets (Taken 1/15/2022 0439)  Airway/Ventilation Management:   airway patency maintained   calming measures promoted   pulmonary hygiene promoted  Head of Bed (HOB) Positioning: HOB at 45 degrees    Pt sats okay, above 90% on 5L NC. Pt though prefers to stay on the chair upright. LS diminished though.

## 2022-01-15 NOTE — PROGRESS NOTES
[facility]    PROGRESS NOTE - Hospitalist Service    Assessment and Plan    76 year old male with past medical history of CHF, COPD with CRF, coronary artery disease, atrial fibrillation brought in to the emergency room department by EMS for further work-up and evaluation of hypoxia and worsening shortness of breath and cough. Found to have signs concerning for lobar pneumonia and positive influenza A.  He was admitted with     Acute on chronic hypoxic respiratory failure   - resolved  - Suspect multifactorial and mainly driven by respiratory infection, influenza, COPD but some concern for elevated BNP suggestive of CHF  - On 5 L nasal cannula at home, at his baseline o2 today.   - Repeat CXR on 1/10 shows worsening opacities.  - Continue other treatments with diuretics, Tamiflu,steroids and nebs per below  - Continuous oximetry  - COVID negative (Vaccinated)  - Back to his baseline oxygen 5 L     Acute influenza A infection/pneumonia  - Chest x-ray reported as new focal airspace consolidation in the right lower lung consistent with new right-sided pneumonia.Repeat CXR on 1/10 shows worsening opacities.procalcitonin neg X2, abx stopped now.   - Not vaccinated for flu, on droplet isolation  - Completed 5 days of  Tamiflu today  - No spuum or blood cx ordered on admission.   - Specimen for respiratory culture never got collected.      Acute blood loss anemia   - Probably secondary to hematoma.  - Baseline Hb 9, Hb today noted to be 5.6.  - Patient has extensive bruises on her thigh and abdominal wall  - S/P Transfuse 1 U, monitor Hb serially.  - Continue to hold Xarelto  - CT femur shows hematoma in her thigh with concern of rectus sheath hematoma   - CT abdomen shows rectus sheath hematoma  - Again hemoglobin dropped to 6.8 today 1/13/2022  - Transfuse another unit of blood on 1/13/2022   - Stable hemoglobin still today  - Continue iron supplement  - Continue to monitor CBC     COPD  exacerbation   - resolved  - On albuterol, DuoNeb and Breo ellipta   - At his baseline home o2 requirement, 5L  - Completed total 5 days of steroids.   - Leukocytosis likely 2/2 to steroids, last dose today. Monitor.     History of CHF  - Presacral edema, elevated BNP on admission  - Last echo was 60 %.    -Repeat ECHO EF 45-50%  - Resume home carvedilol, torsemide      TIFFANIE on CKD  - Appears baseline 1.5-1.6  - Monitor input and output  - Avoid nephrotoxic medications  - Monitor closely, creat slightly up today,   - Restart torsemide as patient is receiving blood     Coronary artery disease  - Denies having chest pain or palpitation.  - negative serial trops  - Status post CABG in the past  - Echocardiogram reviewed  - PTA atorvastatin, coreg     Diabetes mellitus type 2  - No diabetic medication is seen in home medication  - High resistance insulin sliding scale as pt was on steroids, last dose today  - Last hemoglobin A1c 6.3     Parkinson's disease  - PTA Sinemet     History of  atrial fibrillation  - PTA Coreg. Hold Xarelto  --monitor rate on tele     R thigh pain - chronic per patient  - Pt reports R thigh pain which is chronic, worsened in last few days. No injury.  - Xray R femur is negative for fracture  - CT femur shows hematoma but no fracture     Leukocytosis  - Probably secondary to steroid versus reaction to hematoma  - No signs symptoms of infection  -  Improving  - Continue to monitor CBC     Weakness and deconditioning  - Secondary to above  - PT/OT evaluation  - Pending TCU discharge       Active Problems:    Influenza A    Hypoxia    COPD exacerbation (H)    Pneumonia of right lower lobe due to infectious organism      VTE prophylaxis:  Pneumatic Compression Devices  DIET: Orders Placed This Encounter      Combination Diet Regular Diet Adult; 2 gm NA Diet      Disposition/Barriers to discharge: TCU acceptance  Code Status: Full Code    Subjective:  Atif is feeling much better today, denies any  chest pain or shortness of breath.  Improving bruises on his body.    PHYSICAL EXAM  Vitals:    01/06/22 0938 01/07/22 0827 01/12/22 0113   Weight: 74.8 kg (165 lb) 79.1 kg (174 lb 6.4 oz) 74.1 kg (163 lb 4.8 oz)     B/P:112/52 T:97.7 P:68 R:14     Intake/Output Summary (Last 24 hours) at 1/15/2022 1400  Last data filed at 1/15/2022 1100  Gross per 24 hour   Intake 486 ml   Output 500 ml   Net -14 ml      Body mass index is 25.58 kg/m .    Constitutional: awake, alert, cooperative, no apparent distress, and appears stated age  Eyes: Lids and lashes normal, pupils equal, round and reactive to light, extra ocular muscles intact, sclera clear, conjunctiva normal  ENT: Normocephalic, without obvious abnormality, atraumatic, sinuses nontender on palpation, external ears without lesions, oral pharynx with moist mucous membranes, tonsils without erythema or exudates, gums normal and good dentition.  Respiratory: No increased work of breathing, good air exchange, clear to auscultation bilaterally, no crackles or wheezing  Cardiovascular: Normal apical impulse, regular rate and rhythm, normal S1 and S2, no S3 or S4, and no murmur noted  GI: No scars, normal bowel sounds, soft, non-distended, non-tender, no masses palpated, no hepatosplenomegally  Skin:  improving bruises on thigh and lower abdominal wall  Musculoskeletal: There is no redness, warmth, or swelling of the joints.  Full range of motion noted.  no lower extremity pitting edema present  Neurologic: Awake, alert, oriented to name, place and time.  Cranial nerves II-XII are grossly intact.  Motor is 5 out of 5 bilaterally.   Sensory is intact.    Neuropsychiatric: Appropriate with examiner      PERTINENT LABS/IMAGING:  Recent Labs   Lab 01/15/22  1137 01/15/22  0847 01/15/22  0633 01/14/22  2145 01/14/22  0830 01/14/22  0624 01/13/22  0752 01/13/22  0607 01/12/22  0814 01/12/22  0622   WBC  --   --  13.3*  --   --  16.1*  --  18.7*  --  19.1*   HGB  --   --  7.5*   --   --  7.4*  --  6.8*  --  7.7*   MCV  --   --  95  --   --  94  --  94  --  104*   PLT  --   --  343  --   --  309  --  277  --  227   NA  --   --   --   --   --  141  --  140  --  139   POTASSIUM  --   --   --   --   --  4.2  --  4.3  --  4.1   CHLORIDE  --   --   --   --   --  105  --  103  --  101   CO2  --   --   --   --   --  30  --  30  --  30   BUN  --   --   --   --   --  35*  --  35*  --  39*   CR  --   --   --   --   --  1.52*  --  1.67*  --  2.05*   ANIONGAP  --   --   --   --   --  6  --  7  --  8   GELY  --   --   --   --   --  7.8*  --  7.9*  --  7.8*   * 114*  --  92   < > 117   < > 125   < > 156*   ALBUMIN  --   --   --   --   --  2.5*  --  2.5*  --   --    PROTTOTAL  --   --   --   --   --  5.3*  --  5.3*  --   --    BILITOTAL  --   --   --   --   --  1.7*  --  1.1*  --   --    ALKPHOS  --   --   --   --   --  162*  --  160*  --   --    ALT  --   --   --   --   --  <9  --  <9  --   --    AST  --   --   --   --   --  16  --  21  --   --     < > = values in this interval not displayed.     No results found for this or any previous visit (from the past 24 hour(s)).    Discussed with patient, family, nursing staff and discharge planner    Tyson Devries MD  [facility] Medicine Service  [phone]

## 2022-01-16 NOTE — PROGRESS NOTES
Spoke with wife Shahla who is still wanting pt. To go to TCU for short stay. She is also home sick with Influenza and not able to assist pt. As much as he may need initially. Aware of scheduled 11am w/c ride to [facility] tomorrow.

## 2022-01-16 NOTE — PLAN OF CARE
Problem: Respiratory Compromise COPD (Chronic Obstructive Pulmonary Disease)  Goal: Effective Oxygenation and Ventilation  Outcome: Improving   Lung sounds coarse and diminished. Pt reports dyspnea at rest and exertion. On oxygen at 5 LPM. Afebrile.   Problem: Pain Acute  Goal: Acceptable Pain Control and Functional Ability  Outcome: Improving  Intervention: Develop Pain Management Plan  Recent Flowsheet Documentation  Taken 1/15/2022 2248 by Naima Mak RN  Pain Management Interventions: medication (see MAR)  Taken 1/15/2022 1650 by Naima Mak RN  Pain Management Interventions: medication (see MAR)   Pt endorses right hip pain. Manageable with scheduled tylenol and prn iv dilaudid.

## 2022-01-16 NOTE — PROGRESS NOTES
[facility]    PROGRESS NOTE - Hospitalist Service    Assessment and Plan    76 year old male with past medical history of CHF, COPD with CRF, coronary artery disease, atrial fibrillation brought in to the emergency room department by EMS for further work-up and evaluation of hypoxia and worsening shortness of breath and cough. Found to have signs concerning for lobar pneumonia and positive influenza A.  He was admitted with     Acute on chronic hypoxic respiratory failure   - resolved  - Suspect multifactorial and mainly driven by respiratory infection, influenza, COPD but some concern for elevated BNP suggestive of CHF  - On 5 L nasal cannula at home, at his baseline o2 today.   - Repeat CXR on 1/10 shows worsening opacities.  - Continue other treatments with diuretics, Tamiflu,steroids and nebs per below  - Continuous oximetry  - COVID negative (Vaccinated)  - Back to his baseline oxygen 5 L     Acute influenza A infection/pneumonia  - Chest x-ray reported as new focal airspace consolidation in the right lower lung consistent with new right-sided pneumonia.Repeat CXR on 1/10 shows worsening opacities.procalcitonin neg X2, abx stopped now.   - Not vaccinated for flu, on droplet isolation  - Completed 5 days of  Tamiflu today  - No spuum or blood cx ordered on admission.   - Specimen for respiratory culture never got collected.      Acute blood loss anemia   - Probably secondary to hematoma.  - Baseline Hb 9, Hb today noted to be 5.6.  - Patient has extensive bruises on her thigh and abdominal wall  - S/P Transfuse 1 U, monitor Hb serially.  - Continue to hold Xarelto  - CT femur shows hematoma in her thigh with concern of rectus sheath hematoma   - CT abdomen shows rectus sheath hematoma  - Again hemoglobin dropped to 6.8 today 1/13/2022  - Transfuse another unit of blood on 1/13/2022   - Stable hemoglobin over the past 3 days  - Continue iron supplement  - Continue to monitor CBC at  TCU     COPD exacerbation   - resolved  - On albuterol, DuoNeb and Breo ellipta   - At his baseline home o2 requirement, 5L  - Completed total 5 days of steroids.   - Leukocytosis likely 2/2 to steroids, last dose today. Monitor.     History of CHF  - Presacral edema, elevated BNP on admission  - Last echo was 60 %.    -Repeat ECHO EF 45-50%  - Resume home carvedilol, torsemide      TIFFANIE on CKD  - Appears baseline 1.5-1.6  - Monitor input and output  - Avoid nephrotoxic medications  - Monitor closely, creat slightly up today,   - Restart torsemide as patient is receiving blood     Coronary artery disease  - Denies having chest pain or palpitation.  - negative serial trops  - Status post CABG in the past  - Echocardiogram reviewed  - PTA atorvastatin, coreg     Diabetes mellitus type 2  - No diabetic medication is seen in home medication  - High resistance insulin sliding scale as pt was on steroids, last dose today  - Last hemoglobin A1c 6.3     Parkinson's disease  - PTA Sinemet     History of  atrial fibrillation  - PTA Coreg. Hold Xarelto  --monitor rate on tele     R thigh pain - chronic per patient  - Pt reports R thigh pain which is chronic, worsened in last few days. No injury.  - Xray R femur is negative for fracture  - CT femur shows hematoma but no fracture     Leukocytosis  - Probably secondary to steroid versus reaction to hematoma  - No signs symptoms of infection  - Improving  - Continue to monitor CBC at TCU     Weakness and deconditioning  - Secondary to above  - PT/OT evaluation  - Pending TCU discharge       Active Problems:    Influenza A    Hypoxia    COPD exacerbation (H)    Pneumonia of right lower lobe due to infectious organism      VTE prophylaxis:  Pneumatic Compression Devices  DIET: Orders Placed This Encounter      Combination Diet Regular Diet Adult; 2 gm NA Diet      Disposition/Barriers to discharge: TCU approval  Code Status: Full Code    Subjective:  Atif is feeling about the same  today, continue to improve with pain in her right thigh.  No nausea or vomiting.  Tolerating oral diet.    PHYSICAL EXAM  Vitals:    01/06/22 0938 01/07/22 0827 01/12/22 0113   Weight: 74.8 kg (165 lb) 79.1 kg (174 lb 6.4 oz) 74.1 kg (163 lb 4.8 oz)     B/P:114/54 T:97.8 P:72 R:16     Intake/Output Summary (Last 24 hours) at 1/16/2022 1623  Last data filed at 1/16/2022 1331  Gross per 24 hour   Intake 263 ml   Output 1400 ml   Net -1137 ml      Body mass index is 25.58 kg/m .    Constitutional: awake, alert, cooperative, no apparent distress, and appears stated age  Eyes: Lids and lashes normal, pupils equal, round and reactive to light, extra ocular muscles intact, sclera clear, conjunctiva normal  ENT: Normocephalic, without obvious abnormality, atraumatic, sinuses nontender on palpation, external ears without lesions, oral pharynx with moist mucous membranes, tonsils without erythema or exudates, gums normal and good dentition.  Respiratory: No increased work of breathing, good air exchange, clear to auscultation bilaterally, no crackles or wheezing  Cardiovascular: Normal apical impulse, regular rate and rhythm, normal S1 and S2, no S3 or S4, and no murmur noted  GI: No scars, normal bowel sounds, soft, non-distended, non-tender, no masses palpated, no hepatosplenomegally  Skin: no bruising or bleeding and normal skin color, texture, turgor  Musculoskeletal:  improving bruises on right thigh and pelvic area.  Neurologic: Awake, alert, oriented to name, place and time.  Cranial nerves II-XII are grossly intact.  Motor is 5 out of 5 bilaterally.   Sensory is intact.    Neuropsychiatric: Appropriate with examiner      PERTINENT LABS/IMAGING:  Recent Labs   Lab 01/16/22  1232 01/16/22  1207 01/16/22  0918 01/16/22  0840 01/15/22  0847 01/15/22  0633 01/14/22  0830 01/14/22  0624 01/13/22  0752 01/13/22  0607   WBC  --   --   --  13.5*  --  13.3*  --  16.1*  --  18.7*   HGB  --   --   --  7.8*  --  7.5*  --  7.4*  --   6.8*   MCV  --   --   --  99  --  95  --  94  --  94   PLT  --   --   --  377  --  343  --  309  --  277   NA  --   --   --  139  --   --   --  141  --  140   POTASSIUM  --   --   --  4.5  --   --   --  4.2  --  4.3   CHLORIDE  --   --   --  103  --   --   --  105  --  103   CO2  --   --   --  29  --   --   --  30  --  30   BUN  --   --   --  38*  --   --   --  35*  --  35*   CR  --   --   --  1.71*  --   --   --  1.52*  --  1.67*   ANIONGAP  --   --   --  7  --   --   --  6  --  7   GELY  --   --   --  8.0*  --   --   --  7.8*  --  7.9*   * 133* 111* 115   < >  --    < > 117   < > 125   ALBUMIN  --   --   --   --   --   --   --  2.5*  --  2.5*   PROTTOTAL  --   --   --   --   --   --   --  5.3*  --  5.3*   BILITOTAL  --   --   --   --   --   --   --  1.7*  --  1.1*   ALKPHOS  --   --   --   --   --   --   --  162*  --  160*   ALT  --   --   --   --   --   --   --  <9  --  <9   AST  --   --   --   --   --   --   --  16  --  21    < > = values in this interval not displayed.     No results found for this or any previous visit (from the past 24 hour(s)).    Discussed with patient, family, nursing staff and discharge planner    Tyson Devries MD  [facility] Medicine Service  [phone]

## 2022-01-16 NOTE — PLAN OF CARE
"./57 (BP Location: Right arm)   Pulse 81   Temp 98.4  F (36.9  C) (Oral)   Resp 14   Ht 1.702 m (5' 7\")   Wt 74.1 kg (163 lb 4.8 oz)   SpO2 94%   BMI 25.58 kg/m    Patient rate pain at 6 out of 10, refused ice therapy. Patient did received tylenol scheduled results helpful. Pain now down to a 4 out of 10. Patient refused to be bathe today. Continue to monitor.Nelly Dudley, RN    Problem: Pain Acute  Goal: Acceptable Pain Control and Functional Ability  Outcome: Improving  Intervention: Prevent or Manage Pain  Recent Flowsheet Documentation  Taken 1/16/2022 0900 by Nelly Jackson, RN  Bowel Elimination Promotion: adequate fluid intake promoted     "

## 2022-01-16 NOTE — PLAN OF CARE
Patient received one dose of pain medication this shift. Rested well overnight quietly with eyes closed. NAEON.    Problem: Pain Chronic (Persistent) (Comorbidity Management)  Goal: Acceptable Pain Control and Functional Ability  Outcome: Improving     Problem: Respiratory Compromise COPD (Chronic Obstructive Pulmonary Disease)  Goal: Effective Oxygenation and Ventilation  Outcome: Adequate for Discharge

## 2022-01-17 NOTE — PLAN OF CARE
Problem: Functional Ability Impaired COPD (Chronic Obstructive Pulmonary Disease)  Goal: Optimal Level of Functional [place]  Intervention: Optimize Functional Ability  Recent Flowsheet Documentation  Taken 1/16/2022 1600 by Lilly Byers RN  Activity Management: activity adjusted per tolerance     Problem: Respiratory Compromise COPD (Chronic Obstructive Pulmonary Disease)  Goal: Effective Oxygenation and Ventilation  Intervention: Promote Airway Secretion Clearance  Recent Flowsheet Documentation  Taken 1/16/2022 1600 by Lilly Byers RN  Cough And Deep Breathing: done with encouragement  Activity Management: activity adjusted per tolerance     Problem: Pain Chronic (Persistent) (Comorbidity Management)  Goal: Acceptable Pain Control and Functional Ability  Intervention: Manage Persistent Pain  Recent Flowsheet Documentation  Taken 1/16/2022 1600 by Lilly Byers RN  Bowel Elimination Promotion: adequate fluid intake promoted    Congested, non-productive cough.  Dyspnea with exertion.  On 5L O2 via Nasal Cannular.  Coarse lung sounds with expiratory wheezes.  Inhalers admin.      Oral Dilaudid given twice for pain in left hip, lower back and right leg.  Up in chair and ambulated to bathroom using walker and SBA. Reported increased discomfort with activity.    Good appetite.   and 197.  VSS.

## 2022-01-17 NOTE — PLAN OF CARE
Care Management Discharge Note    Discharge Date: 01/17/2022  Expected Time of Departure: 11am    Discharge Disposition: Transitional Care    Discharge Services: Transportation Services    Discharge DME: None    Discharge Transportation: health plan transportation    Private pay costs discussed: transportation costs    PAS Confirmation Code:  ([id number])  Patient/family educated on Medicare website which has current facility and service quality ratings:      Education Provided on the Discharge Plan:    Persons Notified of Discharge Plans: charo Gale  Patient/Family in Agreement with the Plan: yes    Handoff Referral Completed: Yes    Additional Information:  To TCU today.    Carmelina Alexandre RN

## 2022-01-17 NOTE — PLAN OF CARE
Occupational Therapy Discharge Summary    Reason for therapy discharge:    Discharged to long term care facility.    Progress towards therapy goal(s). See goals on Care Plan in [facility] electronic health record for goal details.  Goals partially met.  Barriers to achieving goals:   discharge from facility.    Therapy recommendation(s):    Continued therapy is recommended.  Rationale/Recommendations:  to improve ADL function.

## 2022-01-17 NOTE — PLAN OF CARE
Problem: Discharge Planning  Goal: Discharge Planning (Adult, OB, Behavioral, Peds)  Outcome: Adequate for Discharge   Patient discharged to LTC, transported via w/c.    Placido Obregon RN

## 2022-01-17 NOTE — PLAN OF CARE
Problem: Anxiety  Goal: Anxiety Reduction or Resolution  Outcome: No Change  Intervention: Promote Anxiety Reduction  Supportive Measures: relaxation techniques promoted     Problem: Gas Exchange Impaired  Goal: Optimal Gas Exchange  Intervention: Optimize Oxygenation and Ventilation  Recent Flowsheet Documentation  Head of Bed (HOB) Positioning: HOB at 30-45 degrees     Problem: Respiratory Compromise COPD (Chronic Obstructive Pulmonary Disease)  Goal: Effective Oxygenation and Ventilation  Intervention: Promote Airway Secretion Clearance  Recent Flowsheet Documentation  Cough And Deep Breathing: done independently per patient  Activity Management:   activity adjusted per tolerance   bedrest  Intervention: Optimize Oxygenation and Ventilation  Recent Flowsheet Documentation  Head of Bed (HOB) Positioning: HOB at 30-45 degrees     Problem: Pain Acute  Goal: Acceptable Pain Control and Functional Ability  Intervention: Develop Pain Management Plan  Recent Flowsheet Documentation  Pain Management Interventions:   repositioned   rest   relaxation techniques promoted  Intervention: Prevent or Manage Pain  Recent Flowsheet Documentation  Medication Review/Management: medications reviewed  Intervention: Optimize Psychosocial Wellbeing  Recent Flowsheet Documentation  Supportive Measures: relaxation techniques promoted    Pt denies pain if no movement is happening. He has been in bed sleeping until after 2am that he was unable to sleep anymore because he was anxious about his discharge today. Relaxation tech offered/promoted to help him go back to bed with a Slow relaxing music from TV

## 2022-01-17 NOTE — DISCHARGE SUMMARY
[facility]  Hospitalist Discharge Summary      Date of Admission:  1/6/2022  Date of Discharge:  1/17/2022  Discharging Provider: Tyson Devries MD      Discharge Diagnoses   Acute on chronic respiratory failure with hypoxia, resolved   Chronic home oxygen on 5 L  Acute influenza pneumonia, improving  Acute blood loss anemia secondary to rectus sheath hematoma s/p blood transfusion  COPD exacerbation, resolved  Acute on chronic kidney failure, improving  Chronic kidney disease stage III  Diabetes mellitus type 2  Parkinson disease  History of A. fib, off anticoagulation because of rectus sheath hematoma  Leukocytosis, improved  Weakness and deconditioning  Right thigh hematoma, improving     Follow-ups Needed After Discharge   Follow-up Appointments     Follow Up and recommended labs and tests      Follow up with prison physician.  The following labs/tests are   recommended: CBC, BMP in 2 to 3 days.  Follow up with specialist cardiologist as scheduled             Unresulted Labs Ordered in the Past 30 Days of this Admission     No orders found from 12/7/2021 to 1/7/2022.      These results will be followed up by PCP    Discharge Disposition   Discharged to nursing home  Condition at discharge: Stable    Hospital Course   76 year old male with past medical history of CHF, COPD with CRF, coronary artery disease, atrial fibrillation brought in to the emergency room department by EMS for further work-up and evaluation of hypoxia and worsening shortness of breath and cough. Found to have signs concerning for lobar pneumonia and positive influenza A.  He was admitted, please refer to H&P for details     Acute on chronic hypoxic respiratory failure   - resolved  - Suspect multifactorial and mainly driven by respiratory infection, influenza, COPD but some concern for elevated BNP suggestive of CHF  - On 5 L nasal cannula at home, at his baseline o2 today.   - Repeat CXR on 1/10 shows worsening  opacities.  - Continue other treatments with diuretics, Tamiflu,steroids and nebs per below  - Continuous oximetry shows stable oxygen saturation  - COVID negative (Vaccinated)  - Back to his baseline oxygen 5 L continuously     Acute influenza A infection/pneumonia  - Chest x-ray reported as new focal airspace consolidation in the right lower lung consistent with new right-sided pneumonia.Repeat CXR on 1/10 shows worsening opacities.procalcitonin neg X2, abx stopped now.   - Not vaccinated for flu, on droplet isolation  - Completed 5 days of  Tamiflu today  - No spuum or blood cx ordered on admission.   - Specimen for respiratory culture never got collected.      Acute blood loss anemia   - Probably secondary to hematoma.  - Baseline Hb 9, Hb today noted to be 5.6.  - Patient has extensive bruises on her thigh and abdominal wall  - S/P Transfuse 1 U, monitor Hb serially.  - Continue to hold Xarelto  - CT femur shows hematoma in her thigh with concern of rectus sheath hematoma   - CT abdomen shows rectus sheath hematoma  - Again hemoglobin dropped to 6.8 today 1/13/2022  - Transfuse another unit of blood on 1/13/2022   - Stable hemoglobin over the past 3 days  - Continue iron supplement  - Continue to monitor CBC at TCU     COPD exacerbation   - resolved  - On albuterol, DuoNeb and Breo ellipta   - At his baseline home o2 requirement, 5L  - Completed total 5 days of steroids.   - Leukocytosis likely 2/2 to steroids, last dose today. Monitor.     History of CHF  - Presacral edema, elevated BNP on admission  - Last echo was 60 %.    -Repeat ECHO EF 45-50%  - Resume home carvedilol,   - decrease torsemide dose because of low blood pressure      TIFFANIE on CKD  - Appears baseline 1.5-1.9  - Monitor input and output  - Avoid nephrotoxic medications  - Monitor closely, creatinine is stable- Restart torsemide as patient is receiving blood but at lower dose     Coronary artery disease  - Denies having chest pain or  palpitation.  - negative serial trops  - Status post CABG in the past  - Echocardiogram reviewed  - PTA atorvastatin, coreg     Diabetes mellitus type 2  - No diabetic medication is seen in home medication  - High resistance insulin sliding scale as pt was on steroids, last dose today  - Last hemoglobin A1c 6.3  - No need for medication at this point as blood sugar is stable off steroid     Parkinson's disease  - PTA Sinemet     History of  atrial fibrillation  - PTA Coreg.   - Hold Xarelto on discharge for at least 2 weeks  - Heart rate is stable     Right thigh pain - chronic per patient  - Pt reports R thigh pain which is chronic, worsened in last few days. No injury.  - Xray R femur is negative for fracture  - CT femur shows hematoma but no fracture     Leukocytosis  - Probably secondary to steroid versus reaction to hematoma  - No signs symptoms of infection  - Improving  - Continue to monitor CBC at TCU     Weakness and deconditioning  - Secondary to above  - PT/OT evaluation  -Plan for TCU discharge    Consultations This Hospital Stay   SOCIAL WORK IP CONSULT  PHYSICAL THERAPY ADULT IP CONSULT  OCCUPATIONAL THERAPY ADULT IP CONSULT  PHYSICAL THERAPY ADULT IP CONSULT  OCCUPATIONAL THERAPY ADULT IP CONSULT  PHYSICAL THERAPY ADULT IP CONSULT  OCCUPATIONAL THERAPY ADULT IP CONSULT    Code Status   Full Code    Time Spent on this Encounter   I, Tyson Devries MD, personally saw the patient today and spent greater than 30 minutes discharging this patient.       Tyson Devries MD  [address] [address]  Phone: [phone]  Fax: [phone]  ______________________________________________________________________    Physical Exam   Vital Signs: Temp: 98.6  F (37  C) Temp src: Oral BP: 117/61 Pulse: 69   Resp: 18 SpO2: 94 % O2 Device: Nasal cannula Oxygen Delivery: 5 LPM  Weight: 163 lbs 4.8 oz  Constitutional: awake, alert, cooperative, no apparent  distress, and appears stated age  Eyes: Lids and lashes normal, pupils equal, round and reactive to light, extra ocular muscles intact, sclera clear, conjunctiva normal  ENT: Normocephalic, without obvious abnormality, atraumatic, sinuses nontender on palpation, external ears without lesions, oral pharynx with moist mucous membranes, tonsils without erythema or exudates, gums normal and good dentition.  Respiratory: No increased work of breathing, good air exchange, clear to auscultation bilaterally, no crackles or wheezing  Cardiovascular: Normal apical impulse, regular rate and rhythm, normal S1 and S2, no S3 or S4, and no murmur noted  GI: No scars, normal bowel sounds, soft, non-distended, non-tender, no masses palpated, no hepatosplenomegally  Skin: no bruising or bleeding and normal skin color, texture, turgor  Musculoskeletal:  improving bruises on right thigh and pelvic area.  Neurologic: Awake, alert, oriented to name, place and time.  Cranial nerves II-XII are grossly intact.  Motor is 5 out of 5 bilaterally.   Sensory is intact.    Neuropsychiatric: Appropriate with examiner       Primary Care Physician   Carmelina Lim    Discharge Orders      General info for SNF    Length of Stay Estimate: Short Term Care: Estimated # of Days <30  Condition at Discharge: Stable  Level of care:skilled   Rehabilitation Potential: Fair  Admission H&P remains valid and up-to-date: Yes  Recent Chemotherapy: N/A  Use Nursing Home Standing Orders: Yes     Mantoux instructions    Give two-step Mantoux (PPD) Per Facility Policy Yes     Follow Up and recommended labs and tests    Follow up with retirement physician.  The following labs/tests are recommended: CBC, BMP in 2 to 3 days.  Follow up with specialist cardiologist as scheduled     Reason for your hospital stay    Acute blood loss anemia secondary to she has hematoma and influenza A pneumonia     Activity - Up with assistive device     Physical Therapy Adult Consult     Evaluate and treat as clinically indicated.    Reason: Weakness     Occupational Therapy Adult Consult    Evaluate and treat as clinically indicated.    Reason: Weakness     Fall precautions     Oxygen Adult/Peds     Diet    Follow this diet upon discharge: Orders Placed This Encounter      Combination Diet Regular Diet Adult; 2 gm NA Diet       Significant Results and Procedures   Most Recent 3 CBC's:Recent Labs   Lab Test 01/16/22  0840 01/15/22  0633 01/14/22  0624   WBC 13.5* 13.3* 16.1*   HGB 7.8* 7.5* 7.4*   MCV 99 95 94    343 309     Most Recent 3 BMP's:Recent Labs   Lab Test 01/17/22  0822 01/17/22  0738 01/16/22  2114 01/16/22  0918 01/16/22  0840 01/14/22  0830 01/14/22  0624 01/13/22  0752 01/13/22  0607   NA  --   --   --   --  139  --  141  --  140   POTASSIUM  --   --   --   --  4.5  --  4.2  --  4.3   CHLORIDE  --   --   --   --  103  --  105  --  103   CO2  --   --   --   --  29  --  30  --  30   BUN  --   --   --   --  38*  --  35*  --  35*   CR  --   --   --   --  1.71*  --  1.52*  --  1.67*   ANIONGAP  --   --   --   --  7  --  6  --  7   GELY  --   --   --   --  8.0*  --  7.8*  --  7.9*   * 124* 197*   < > 115   < > 117   < > 125    < > = values in this interval not displayed.   ,   Results for orders placed or performed during the hospital encounter of 01/06/22   US Lower Extremity Venous Duplex Right    Narrative    EXAM: US LOWER EXTREMITY VENOUS DUPLEX RIGHT  LOCATION: [facility]  DATE/TIME: 1/6/2022 10:39 AM    INDICATION: Right calf pain  COMPARISON: None.  TECHNIQUE: Venous Duplex ultrasound of the right lower extremity with and without compression, augmentation and duplex. Color flow and spectral Doppler with waveform analysis performed.    FINDINGS: Exam includes the common femoral, femoral, popliteal, and contralateral common femoral veins as well as segmentally visualized deep calf veins and greater saphenous vein.     RIGHT: No deep vein  thrombosis. No superficial thrombophlebitis. No popliteal cyst.      Impression    IMPRESSION:  1.  No deep venous thrombosis in the right lower extremity.   XR Chest 1 View    Narrative    EXAM: XR CHEST 1 VIEW  LOCATION: [facility]  DATE/TIME: 1/6/2022 11:37 AM    INDICATION: Short of breath  COMPARISON: 3/10/2016.      Impression    IMPRESSION: There is new focal airspace consolidation in the right lower lung consistent with new right-sided pneumonia. There are chronic increased interstitial markings at the lung bases. Heart size is upper normal. Pulmonary vascularity is normal.   Postoperative change from median sternotomy. Spinal stimulator electrodes over the midthoracic spine.    NOTE: ABNORMAL REPORT    THE DICTATION ABOVE DESCRIBES AN ABNORMALITY FOR WHICH FOLLOW-UP IS NEEDED.    XR Chest Port 1 View    Narrative    EXAM: XR CHEST PORT 1 VIEW  LOCATION: [facility]  DATE/TIME: 1/10/2022 9:36 AM    INDICATION: SOB  COMPARISON: 01/06/2022       Impression    IMPRESSION: Heterogeneous pulmonary opacities have slightly increased from the prior study. The cardiomediastinal silhouette is enlarged. Sternotomy suture wires are intact. Electrodes overlie the spinal canal.    XR Femur Right 2 Views    Narrative    EXAM: XR FEMUR RIGHT 2 VIEW  LOCATION: [facility]  DATE/TIME: 1/11/2022 9:51 AM    INDICATION: Right thigh pain.  COMPARISON: None available.      Impression    IMPRESSION: Chronic healed lateral plate and screw-fixed distal right femoral shaft fracture. Hardware consists of a long lateral plate across the right femur with multiple screws. No evidence for hardware failure or loosening. Right total knee   arthroplasty with patellar resurfacing. Diffuse bone demineralization. No acute right femur fracture or dislocation. Mild right hip osteoarthritis. Combined postop and degenerative change lower lumbar spine. Arterial calcification.  Surgical clips in the   posterior proximal right leg. No sizable right knee joint effusion.               CT Femur Thigh Right w/o Contrast    Narrative    EXAM: CT FEMUR THIGH RIGHT WITHOUT CONTRAST  LOCATION: [facility]  DATE/TIME: 1/12/2022 1:18 PM    INDICATION: Chronic right-sided pain, recently worsening. No injury. Anticoagulated, and hemoglobin drop. Suspected spontaneous hemorrhage.    COMPARISON: None.    TECHNIQUE: Noncontrast. Axial, sagittal and coronal thin-section reconstruction. Dose reduction techniques were used.     FINDINGS: Plate and screw fixation across old, largely healed fracture of the distal femoral shaft. Knee arthroplasty. There are beam hardening artifacts from the orthopedic hardware which partially obscure the surrounding soft tissues. No   acute-appearing fracture. Sclerosis right inferior pubic ramus is nonspecific.    There is swelling of the right sartorius and likely adductor longus and gracilis muscles in the proximal right thigh. There is heterogeneity and increased density in the swollen muscles which is probably accentuated by metallic artifacts from the   orthopedic hardware. There is diffuse subcutaneous soft tissue stranding involving the right thigh.    There is likely a a left rectus sheath hematoma on the most superior images, incompletely imaged. Small amount of free fluid in the pelvis.      Impression    IMPRESSION:  1.  Swelling involving the right sartorius and likely the adductor longus and gracilis muscles in the proximal right thigh is nonspecific and could represent hemorrhage.  2.  Diffuse subcutaneous soft tissue stranding involving the right thigh suggesting edema.  3.  Probable left rectus sheath hematoma, incompletely imaged. Recommend CT abdomen pelvis for more complete evaluation.  4.  Nonspecific and indeterminate sclerosis of the right inferior pubic ramus. Sclerotic metastasis is possible. MRI or radionuclide bone scan could  assess further.     CT Abdomen Pelvis w/o Contrast    Narrative    EXAM: CT ABDOMEN PELVIS W/O CONTRAST  LOCATION: [facility]  DATE/TIME: 1/12/2022 4:45 PM    INDICATION: Hypoxia and shortness of breath. Influenza pneumonia. Acute anemia. Acute kidney injury.  COMPARISON: None.  TECHNIQUE: CT scan of the abdomen and pelvis was performed without IV contrast. Multiplanar reformats were obtained. Dose reduction techniques were used.  CONTRAST: None.    FINDINGS:   LOWER CHEST: There are multiple bands of dense linear peripheral opacity suggesting atelectasis or fibrosis at both lung bases. Tiny bilateral pleural effusions and mild cardiomegaly.    HEPATOBILIARY: Cholelithiasis. No suggestion for pericholecystic inflammation. Mild ascites surrounds liver.    PANCREAS: There is a vague 1.5 cm low-density focus within the inferior head of pancreas possibly relating to dilated distal CBD but more likely relating to a pancreatic cyst. Remainder of the gland unremarkable. No duct dilatation. Streak artifact at   this level from the implantable neurostimulator.    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    ADRENAL GLANDS: Normal.    KIDNEYS/BLADDER: There is left-sided hydronephrosis and hydroureter with ureter dilated down to nearly the level of the UVJ but no definite obstructing mass or stone identified. Calcifications within left renal hilum favored to be vascular.    No right-sided stones or hydronephrosis. Slight vascular calcification and small renal cysts present.    Minimal diffuse bladder wall thickening. No bladder stones.    BOWEL: No obstruction or inflammatory change. Moderate stool burden throughout colon.    LYMPH NODES: Normal.    VASCULATURE: Moderate calcific atherosclerotic plaque.    PELVIC ORGANS: Normal.    MUSCULOSKELETAL: There are 2 discrete intramuscular hematomas evident, largest is within the inferior left rectus muscle with maximal dimension 5 x 5 cm on axial  images; second focus of hemorrhage involves the visible proximal right quadriceps muscles.   There is also subcutaneous edema lateral to the right hip and proximal thigh and the left flank.    Partial visualization of orthopedic hardware involving right femoral neck and proximal femur. Instrumentation and fusion involving low lumbar spine. Prominent spinal degenerative changes.      Impression    IMPRESSION:   1.  Moderate left hydronephrosis/hydroureter, etiology uncertain with no definite stones or obstructing mass.  2.  Moderate size localized hematomas involving both the right quadriceps musculature and left inferior rectus muscle. Surrounding lateral subcutaneous soft tissue stranding may represent additional ecchymoses.  3.  Cholelithiasis.  4.  Very mild ascites. Trace volume bilateral pleural effusions. Mild cardiomegaly.  5.  Bands of linear discoid atelectasis or fibrosis at lung bases.  6.  Vague 1.5 cm cystic focus within pancreatic head favored to be a low-grade or benign but incompletely evaluated on this noncontrast study.     Echocardiogram Complete     Value    LVEF  45-50% (mildly reduced)    [facility]    [id number]  [id number]  [id number]  [id number]^[place] [address]     Name: DINA SANCHEZ  MRN: [id number]  : 1945  Study Date: 2022 06:59 AM  Age: 76 yrs  Gender: Male  Patient Location: [facility]  Reason For Study: CHF  Ordering Physician: GUERO MALDONADO  Performed By: YOLY     BSA: 1.9 m2  Height: 67 in  Weight: 165 lb  HR: 61  ______________________________________________________________________________  Procedure  Compared to the prior study dated 3/10/2016, there have been no changes.  ______________________________________________________________________________  Interpretation Summary     1.Left ventricular function is decreased. The ejection fraction is 45-50%  (mildly reduced).  2.There is mild concentric  left ventricular hypertrophy.  3.TAPSE is abnormal, which is consistent with abnormal right ventricular  systolic function.  4.The left atrium is mild to moderately dilated.  5.There is mild to moderate (1-2+) tricuspid regurgitation.  6.Right ventricular systolic pressure is elevated, consistent with moderate to  severe pulmonary hypertension.  7.IVC diameter >2.1 cm collapsing <50% with sniff suggests a high RA pressure  estimated at 15 mmHg or greater.  8.Compared to the prior study dated 3/10/2016, the LV and RV EF's are  diminished, pulmonary hypertension is now moderate to severe, inferior vena  cava does not collapse.  ______________________________________________________________________________  I      WMSI = 1.00     % Normal = 100     X - Cannot   0 -                      (2) - Mildly 2 -          Segments  Size  Interpret    Hyperkinetic 1 - Normal  Hypokinetic  Hypokinetic  1-2     small                                                     7 -          3-5      moderate  3 - Akinetic 4 -          5 -         6 - Akinetic Dyskinetic   6-14    large               Dyskinetic   Aneurysmal  w/scar       w/scar       15-16   diffuse     Left Ventricle  Left ventricular function is decreased. The ejection fraction is 45-50%  (mildly reduced). There is mild concentric left ventricular hypertrophy. Grade  III or advanced diastolic dysfunction. No regional wall motion abnormalities  noted.     Right Ventricle  TAPSE is abnormal, which is consistent with abnormal right ventricular  systolic function.     Atria  The left atrium is mild to moderately dilated. Right atrial size is normal.  There is no color Doppler evidence of an atrial shunt.     Mitral Valve  Mitral valve leaflets appear normal. There is no evidence of mitral stenosis  or clinically significant mitral regurgitation. There is mild (1+) mitral  regurgitation. There is no mitral valve stenosis.     Tricuspid Valve  The tricuspid valve is not well  visualized, but is grossly normal. Right  ventricular systolic pressure is elevated, consistent with moderate to severe  pulmonary hypertension. There is mild to moderate (1-2+) tricuspid  regurgitation.     Aortic Valve  Aortic valve leaflets appear normal. There is no evidence of aortic stenosis  or clinically significant aortic regurgitation. There is trivial trileaflet  aortic sclerosis. No aortic regurgitation is present. No aortic stenosis is  present.     Pulmonic Valve  The pulmonic valve is not well seen, but is grossly normal. This degree of  valvular regurgitation is within normal limits. There is no pulmonic valvular  stenosis.     Vessels  The aorta root is normal. Normal size ascending aorta. IVC diameter >2.1 cm  collapsing <50% with sniff suggests a high RA pressure estimated at 15 mmHg or  greater.     Pericardium  There is no pericardial effusion.     Rhythm  Sinus rhythm was noted.     ______________________________________________________________________________  MMode/2D Measurements & Calculations  IVSd: 1.7 cm  LVIDd: 4.8 cm  LVIDs: 3.2 cm  LVPWd: 1.3 cm     FS: 33.2 %  LV mass(C)d: 311.9 grams  LV mass(C)dI: 167.4 grams/m2  Ao root diam: 3.4 cm  LA dimension: 5.4 cm  asc Aorta Diam: 2.8 cm  LA/Ao: 1.6  LVOT diam: 2.3 cm  LVOT area: 4.2 cm2  LA Volume Indexed (AL/bp): 60.7 ml/m2  RWT: 0.55     Doppler Measurements & Calculations  MV E max robb: 145.0 cm/sec  MV A max robb: 44.7 cm/sec  MV E/A: 3.2     MV dec slope: 983.0 cm/sec2  MV dec time: 0.15 sec  Ao V2 max: 126.7 cm/sec  Ao max P.0 mmHg  Ao V2 mean: 88.5 cm/sec  Ao mean PG: 3.5 mmHg  Ao V2 VTI: 25.9 cm  BRII(I,D): 3.0 cm2  BRII(V,D): 2.6 cm2  LV V1 max P.5 mmHg  LV V1 max: 79.2 cm/sec  LV V1 VTI: 18.3 cm  SV(LVOT): 77.0 ml  SI(LVOT): 41.3 ml/m2  PA acc time: 0.08 sec  PI end-d robb: 181.9 cm/sec  TR max robb: 392.1 cm/sec  TR max P.5 mmHg  AV Robb Ratio (DI): 0.62  BRII Index (cm2/m2): 1.6  E/E' av.2  Lateral E/e': 16.1  Medial  E/e': 26.3     ______________________________________________________________________________  Report approved by: Adelaida Gillespie 01/07/2022 10:22 AM               Discharge Medications   Discharge Medication List as of 1/17/2022  9:34 AM      START taking these medications    Details   polyethylene glycol (MIRALAX) 17 g packet Take 17 g by mouth daily, No Print Out      HYDROmorphone (DILAUDID) 2 MG tablet Take 0.5 tablets (1 mg) by mouth every 6 hours as needed for moderate to severe pain, Disp-10 tablet, R-0, Local Print         CONTINUE these medications which have CHANGED    Details   acetaminophen (TYLENOL) 325 MG tablet Take 3 tablets (975 mg) by mouth 3 times daily, No Print Out      torsemide (DEMADEX) 20 MG tablet Take 2 tablets (40 mg) by mouth daily, No Print Out         CONTINUE these medications which have NOT CHANGED    Details   albuterol (PROAIR HFA/PROVENTIL HFA/VENTOLIN HFA) 108 (90 Base) MCG/ACT inhaler Inhale 1-6 puffs into the lungs every 6 hours as needed for shortness of breath / dyspnea or wheezing, Historical      atorvastatin (LIPITOR) 80 MG tablet Take 80 mg by mouth At Bedtime, Historical      busPIRone (BUSPAR) 7.5 MG tablet Take 7.5 mg by mouth 2 times daily, Historical      carbidopa-levodopa (SINEMET)  mg per tablet Take 1 tablet by mouth 2 times daily , Historical      carvedilol (COREG) 6.25 MG tablet Take 6.25 mg by mouth 2 times daily (with meals), Historical      diclofenac (VOLTAREN) 1 % topical gel Apply 2 g topically 2 times daily as needed for moderate pain, Historical      DULoxetine (CYMBALTA) 20 MG capsule Take 40 mg by mouth daily, Historical      ferrous sulfate (FEROSUL) 325 (65 Fe) MG tablet Take 325 mg by mouth daily (with breakfast), Historical      fish oil-omega-3 fatty acids 1000 MG capsule Take 1 g by mouth daily, Historical      fluticasone (FLONASE) 50 MCG/ACT nasal spray Spray 2 sprays into both nostrils daily, Historical       Fluticasone-Umeclidin-Vilanterol (TRELEGY ELLIPTA) 100-62.5-25 MCG/INH oral inhaler Inhale 1 puff into the lungs daily, Historical      gabapentin (NEURONTIN) 400 MG capsule Take 400 mg by mouth 2 times daily, Historical      melatonin 3 MG tablet Take 9 mg by mouth At Bedtime, Historical      nitroGLYcerin (NITROSTAT) 0.4 MG sublingual tablet Place 0.4 mg under the tongue every 5 minutes as needed for chest pain For chest pain place 1 tablet under the tongue every 5 minutes for 3 doses. If symptoms persist 5 minutes after 1st dose call 911., Historical      pantoprazole (PROTONIX) 40 MG EC tablet Take 40 mg by mouth daily, Historical      senna-docusate (SENOKOT-S/PERICOLACE) 8.6-50 MG tablet Take 2 tablets by mouth At Bedtime, Historical      tamsulosin (FLOMAX) 0.4 MG capsule Take 0.4 mg by mouth At Bedtime, Historical         STOP taking these medications       rivaroxaban ANTICOAGULANT (XARELTO) 15 MG TABS tablet Comments:   Reason for Stopping:         traMADol (ULTRAM) 50 MG tablet Comments:   Reason for Stopping:             Allergies   No Known Allergies

## 2022-01-17 NOTE — PLAN OF CARE
Physical Therapy Discharge Summary    Reason for therapy discharge:    Discharged to transitional care facility.    Progress towards therapy goal(s). See goals on Care Plan in [facility] electronic health record for goal details.  Goals partially met.  Barriers to achieving goals:   limited tolerance for therapy and discharge from facility.    Therapy recommendation(s):    Continued therapy is recommended.  Rationale/Recommendations:  at  TCU.

## 2022-01-18 NOTE — PROGRESS NOTES
Clinic Care Coordination Contact    Background: Care Coordination referral placed from [facility] discharge report for reason of patient meeting criteria for a TCM outreach call by [facility] team.    Assessment: Upon chart review, [facility] Team member will cancel/close the referral for TCM outreach due to reason below:    Patient is not established within [facility]. Upon chart review, [facility] Team member noted patient discharged to TCU    Plan: Care Coordination referral for TCM outreach canceled.    JUDY Luciano  [facility] [place], [facility]

## 2022-01-18 NOTE — LETTER
1/18/2022        RE: Atif Romero  [address] [place] [address]        [facility]  Chief Complaint   Patient presents with     [facility] F/U     [facility] Record Number:  [id number]  Place of Service where encounter took place:  [facility] ([facility]) [[id number]]  Code Status:  Full    HISTORY:      HPI:  Atif Romero  is 76 year old (1945) undergoing physical and occupational therapy. He is  with past medical history of CHF, COPD with CRF, coronary artery disease, atrial fibrillation brought in to the emergency room department by EMS for further work-up and evaluation of hypoxia and worsening shortness of breath and cough. Found to have signs concerning for lobar pneumonia and positive influenza A.     Patient seen today to review vital signs, labs follow-up pneumonia and to establish care.  He denied chest pain.  He does have chronic shortness of breath and is on 5 L nasal cannula continuously.  Denied constipation diarrhea.  Labs reviewed hemoglobin on 1/16 7.8 with a white blood cell count of 13.5 he does have labs pending.  He is with a right thigh hematoma it appears possibly related to his Xarelto which is currently on hold.  He rates his pain  8 out of 10.  He is with 3+ edema right leg and it is ecchymotic from his hip to his ankle.  His main concern today was some nasal congestion.  Ocean nasal spray ordered as needed, okay to leave at the bedside.  He is on as needed Dilaudid for pain.    ALLERGIES:Patient has no known allergies.    PAST MEDICAL HISTORY: History reviewed. No pertinent past medical history.    PAST SURGICAL HISTORY:   has a past surgical history that includes IR Lumbar Epidural Steroid Injection (6/1/2006); IR Lumbar Epidural Steroid Injection (7/26/2006); IR Lumbar Epidural Steroid Injection (10/11/2006); IR Lumbar Epidural Steroid Injection (8/15/2007); IR Lumbar Epidural Steroid Injection (12/7/2007); IR Lumbar Epidural  Steroid Injection (4/15/2008); Bypass graft artery coronary; back surgery; joint replacement; and Splenectomy, Total.    FAMILY HISTORY: family history is not on file.    SOCIAL HISTORY:  reports that he has been smoking. He has never used smokeless tobacco.    ROS:  Constitutional: Negative for activity change, appetite change, fatigue and fever.   HENT: Negative for congestion.    Respiratory: Negative for cough, shortness of breath and wheezing. 5 L NC continuous (home dose)   Cardiovascular: Negative for chest pain and leg swelling.   Gastrointestinal: Negative for abdominal distention, abdominal pain, constipation, diarrhea and nausea.   Genitourinary: Negative for dysuria.   Musculoskeletal: Negative for arthralgia. Negative for back pain.   Skin: Negative for color change and wound.  Right leg ecchymotic from hip to ankle related to a right thigh hematoma  Neurological: Negative for dizziness.   Psychiatric/Behavioral: Negative for agitation, behavioral problems and confusion.     Physical Exam:  Constitutional:       Appearance: Patient is well-developed.   HENT:      Head: Normocephalic.   Eyes:      Conjunctiva/sclera: Conjunctivae normal.   Neck:      Musculoskeletal: Normal range of motion.   Cardiovascular:      Rate and Rhythm: Normal rate and regular rhythm.      Heart sounds: Normal heart sounds. No murmur.   Pulmonary:      Effort: No respiratory distress.      Breath sounds: Normal breath sounds. No wheezing or rales.   Abdominal:      General: Bowel sounds are normal. There is no distension.      Palpations: Abdomen is soft.      Tenderness: There is no abdominal tenderness.   Musculoskeletal:       Normal range of motion.     Skin:General:        Skin is warm. Ecchymosis from right hip to ankle- Hospital CT scan showed a rectus sheath hematoma    Neurological:         Mental Status: Patient is alert and oriented to person, place, and time.   Psychiatric:         Behavior: Behavior normal.  "    Vitals:/46   Pulse 62   Temp 98.7  F (37.1  C)   Resp 20   Ht 1.702 m (5' 7\")   Wt 79.7 kg (175 lb 12.8 oz)   SpO2 94%   BMI 27.53 kg/m   and Body mass index is 27.53 kg/m .    Lab/Diagnostic data:   Recent Results (from the past 240 hour(s))   Glucose by meter    Collection Time: 01/09/22  5:00 PM   Result Value Ref Range    GLUCOSE BY METER POCT 250 (H) 70 - 99 mg/dL   Glucose by meter    Collection Time: 01/09/22  9:21 PM   Result Value Ref Range    GLUCOSE BY METER POCT 134 (H) 70 - 99 mg/dL   Lactic Acid STAT    Collection Time: 01/10/22  4:43 AM   Result Value Ref Range    Lactic Acid 0.9 0.7 - 2.0 mmol/L   Extra Red Top Tube    Collection Time: 01/10/22  4:43 AM   Result Value Ref Range    Hold Specimen JIC    Extra Purple Top Tube    Collection Time: 01/10/22  4:43 AM   Result Value Ref Range    Hold Specimen JIC    Procalcitonin    Collection Time: 01/10/22  4:43 AM   Result Value Ref Range    Procalcitonin 0.07 0.00 - 0.49 ng/mL   Glucose by meter    Collection Time: 01/10/22  8:25 AM   Result Value Ref Range    GLUCOSE BY METER POCT 113 (H) 70 - 99 mg/dL   Glucose by meter    Collection Time: 01/10/22 12:50 PM   Result Value Ref Range    GLUCOSE BY METER POCT 226 (H) 70 - 99 mg/dL   Glucose by meter    Collection Time: 01/10/22  5:06 PM   Result Value Ref Range    GLUCOSE BY METER POCT 230 (H) 70 - 99 mg/dL   Glucose by meter    Collection Time: 01/10/22  9:09 PM   Result Value Ref Range    GLUCOSE BY METER POCT 204 (H) 70 - 99 mg/dL   CBC with platelets    Collection Time: 01/11/22  6:24 AM   Result Value Ref Range    WBC Count 18.4 (H) 4.0 - 11.0 10e3/uL    RBC Count 2.04 (L) 4.40 - 5.90 10e6/uL    Hemoglobin 5.7 (LL) 13.3 - 17.7 g/dL    Hematocrit 18.4 (L) 40.0 - 53.0 %    MCV 90 78 - 100 fL    MCH 27.9 26.5 - 33.0 pg    MCHC 31.0 (L) 31.5 - 36.5 g/dL    RDW 18.8 (H) 10.0 - 15.0 %    Platelet Count 223 150 - 450 10e3/uL   Basic metabolic panel    Collection Time: 01/11/22  6:24 AM "   Result Value Ref Range    Sodium 144 136 - 145 mmol/L    Potassium 3.6 3.5 - 5.0 mmol/L    Chloride 104 98 - 107 mmol/L    Carbon Dioxide (CO2) 30 22 - 31 mmol/L    Anion Gap 10 5 - 18 mmol/L    Urea Nitrogen 39 (H) 8 - 28 mg/dL    Creatinine 2.09 (H) 0.70 - 1.30 mg/dL    Calcium 7.7 (L) 8.5 - 10.5 mg/dL    Glucose 130 (H) 70 - 125 mg/dL    GFR Estimate 32 (L) >60 mL/min/1.73m2   Glucose by meter    Collection Time: 01/11/22  8:27 AM   Result Value Ref Range    GLUCOSE BY METER POCT 124 (H) 70 - 99 mg/dL   Hemoglobin    Collection Time: 01/11/22  9:00 AM   Result Value Ref Range    Hemoglobin 5.6 (LL) 13.3 - 17.7 g/dL   Adult Type and Screen    Collection Time: 01/11/22  9:00 AM   Result Value Ref Range    ABO/RH(D) AB POS     Antibody Screen Negative Negative    SPECIMEN EXPIRATION DATE [id number]    Prepare red blood cells (unit)    Collection Time: 01/11/22  9:45 AM   Result Value Ref Range    CROSSMATCH Compatible     UNIT ABO/RH AB Pos     Unit Number [id number]     Unit Status Transfused     Blood Component Type Red Blood Cells     Product Code [id number]     CODING SYSTEM [id number]     UNIT TYPE ISBT 8400     ISSUE DATE AND TIME [id number]    Glucose by meter    Collection Time: 01/11/22 12:08 PM   Result Value Ref Range    GLUCOSE BY METER POCT 170 (H) 70 - 99 mg/dL   Glucose by meter    Collection Time: 01/11/22  4:50 PM   Result Value Ref Range    GLUCOSE BY METER POCT 253 (H) 70 - 99 mg/dL   Hemoglobin    Collection Time: 01/11/22  5:06 PM   Result Value Ref Range    Hemoglobin 6.4 (LL) 13.3 - 17.7 g/dL   Lactic Acid STAT    Collection Time: 01/11/22  5:06 PM   Result Value Ref Range    Lactic Acid 1.5 0.7 - 2.0 mmol/L   Extra Red Top Tube    Collection Time: 01/11/22  5:13 PM   Result Value Ref Range    Hold Specimen JI    Prepare red blood cells (unit)    Collection Time: 01/11/22  6:15 PM   Result Value Ref Range    CROSSMATCH Compatible     UNIT ABO/RH B Pos     Unit Number [id number]      Unit Status Transfused     Blood Component Type Red Blood Cells     Product Code F5913L19     CODING SYSTEM [id number]     UNIT TYPE ISBT 7300     ISSUE DATE AND TIME [id number]    Glucose by meter    Collection Time: 01/11/22  8:11 PM   Result Value Ref Range    GLUCOSE BY METER POCT 251 (H) 70 - 99 mg/dL   CBC with platelets    Collection Time: 01/12/22  6:22 AM   Result Value Ref Range    WBC Count 19.1 (H) 4.0 - 11.0 10e3/uL    RBC Count 2.62 (L) 4.40 - 5.90 10e6/uL    Hemoglobin 7.7 (L) 13.3 - 17.7 g/dL    Hematocrit 27.2 (L) 40.0 - 53.0 %     (H) 78 - 100 fL    MCH 29.4 26.5 - 33.0 pg    MCHC 28.3 (L) 31.5 - 36.5 g/dL    RDW 16.8 (H) 10.0 - 15.0 %    Platelet Count 227 150 - 450 10e3/uL   Basic metabolic panel    Collection Time: 01/12/22  6:22 AM   Result Value Ref Range    Sodium 139 136 - 145 mmol/L    Potassium 4.1 3.5 - 5.0 mmol/L    Chloride 101 98 - 107 mmol/L    Carbon Dioxide (CO2) 30 22 - 31 mmol/L    Anion Gap 8 5 - 18 mmol/L    Urea Nitrogen 39 (H) 8 - 28 mg/dL    Creatinine 2.05 (H) 0.70 - 1.30 mg/dL    Calcium 7.8 (L) 8.5 - 10.5 mg/dL    Glucose 156 (H) 70 - 125 mg/dL    GFR Estimate 33 (L) >60 mL/min/1.73m2   Glucose by meter    Collection Time: 01/12/22  8:14 AM   Result Value Ref Range    GLUCOSE BY METER POCT 138 (H) 70 - 99 mg/dL   Glucose by meter    Collection Time: 01/12/22 11:33 AM   Result Value Ref Range    GLUCOSE BY METER POCT 182 (H) 70 - 99 mg/dL   Glucose by meter    Collection Time: 01/12/22  4:51 PM   Result Value Ref Range    GLUCOSE BY METER POCT 166 (H) 70 - 99 mg/dL   Lactic Acid STAT    Collection Time: 01/12/22  6:16 PM   Result Value Ref Range    Lactic Acid 1.8 0.7 - 2.0 mmol/L   Asymptomatic COVID-19 Virus (Coronavirus) by PCR Nasopharyngeal    Collection Time: 01/12/22  6:28 PM    Specimen: Nasopharyngeal; Swab   Result Value Ref Range    SARS CoV2 PCR Negative Negative   Glucose by meter    Collection Time: 01/12/22 10:14 PM   Result Value Ref Range    GLUCOSE  BY METER POCT 171 (H) 70 - 99 mg/dL   Glucose by meter    Collection Time: 01/13/22  1:59 AM   Result Value Ref Range    GLUCOSE BY METER POCT 165 (H) 70 - 99 mg/dL   CBC with platelets    Collection Time: 01/13/22  6:07 AM   Result Value Ref Range    WBC Count 18.7 (H) 4.0 - 11.0 10e3/uL    RBC Count 2.33 (L) 4.40 - 5.90 10e6/uL    Hemoglobin 6.8 (LL) 13.3 - 17.7 g/dL    Hematocrit 21.8 (L) 40.0 - 53.0 %    MCV 94 78 - 100 fL    MCH 29.2 26.5 - 33.0 pg    MCHC 31.2 (L) 31.5 - 36.5 g/dL    RDW 17.6 (H) 10.0 - 15.0 %    Platelet Count 277 150 - 450 10e3/uL   Comprehensive metabolic panel    Collection Time: 01/13/22  6:07 AM   Result Value Ref Range    Sodium 140 136 - 145 mmol/L    Potassium 4.3 3.5 - 5.0 mmol/L    Chloride 103 98 - 107 mmol/L    Carbon Dioxide (CO2) 30 22 - 31 mmol/L    Anion Gap 7 5 - 18 mmol/L    Urea Nitrogen 35 (H) 8 - 28 mg/dL    Creatinine 1.67 (H) 0.70 - 1.30 mg/dL    Calcium 7.9 (L) 8.5 - 10.5 mg/dL    Glucose 125 70 - 125 mg/dL    Alkaline Phosphatase 160 (H) 45 - 120 U/L    AST 21 0 - 40 U/L    ALT <9 0 - 45 U/L    Protein Total 5.3 (L) 6.0 - 8.0 g/dL    Albumin 2.5 (L) 3.5 - 5.0 g/dL    Bilirubin Total 1.1 (H) 0.0 - 1.0 mg/dL    GFR Estimate 42 (L) >60 mL/min/1.73m2   Prepare red blood cells (unit)    Collection Time: 01/13/22  7:00 AM   Result Value Ref Range    CROSSMATCH Compatible     UNIT ABO/RH AB Pos     Unit Number [id number]     Unit Status Transfused     Blood Component Type Red Blood Cells     Product Code [id number]     CODING SYSTEM [id number]     UNIT TYPE ISBT 8400     ISSUE DATE AND TIME [id number]    Glucose by meter    Collection Time: 01/13/22  7:52 AM   Result Value Ref Range    GLUCOSE BY METER POCT 111 (H) 70 - 99 mg/dL   Glucose by meter    Collection Time: 01/13/22 12:01 PM   Result Value Ref Range    GLUCOSE BY METER POCT 106 (H) 70 - 99 mg/dL   Glucose by meter    Collection Time: 01/13/22  4:49 PM   Result Value Ref Range    GLUCOSE BY METER POCT 167 (H) 70 -  99 mg/dL   Occult blood stool    Collection Time: 01/13/22  5:43 PM   Result Value Ref Range    Occult Blood Negative Negative   Glucose by meter    Collection Time: 01/13/22  8:55 PM   Result Value Ref Range    GLUCOSE BY METER POCT 190 (H) 70 - 99 mg/dL   Lactic Acid STAT    Collection Time: 01/13/22 11:52 PM   Result Value Ref Range    Lactic Acid 1.0 0.7 - 2.0 mmol/L   CBC with platelets    Collection Time: 01/14/22  6:24 AM   Result Value Ref Range    WBC Count 16.1 (H) 4.0 - 11.0 10e3/uL    RBC Count 2.51 (L) 4.40 - 5.90 10e6/uL    Hemoglobin 7.4 (L) 13.3 - 17.7 g/dL    Hematocrit 23.5 (L) 40.0 - 53.0 %    MCV 94 78 - 100 fL    MCH 29.5 26.5 - 33.0 pg    MCHC 31.5 31.5 - 36.5 g/dL    RDW 17.5 (H) 10.0 - 15.0 %    Platelet Count 309 150 - 450 10e3/uL   Comprehensive metabolic panel    Collection Time: 01/14/22  6:24 AM   Result Value Ref Range    Sodium 141 136 - 145 mmol/L    Potassium 4.2 3.5 - 5.0 mmol/L    Chloride 105 98 - 107 mmol/L    Carbon Dioxide (CO2) 30 22 - 31 mmol/L    Anion Gap 6 5 - 18 mmol/L    Urea Nitrogen 35 (H) 8 - 28 mg/dL    Creatinine 1.52 (H) 0.70 - 1.30 mg/dL    Calcium 7.8 (L) 8.5 - 10.5 mg/dL    Glucose 117 70 - 125 mg/dL    Alkaline Phosphatase 162 (H) 45 - 120 U/L    AST 16 0 - 40 U/L    ALT <9 0 - 45 U/L    Protein Total 5.3 (L) 6.0 - 8.0 g/dL    Albumin 2.5 (L) 3.5 - 5.0 g/dL    Bilirubin Total 1.7 (H) 0.0 - 1.0 mg/dL    GFR Estimate 47 (L) >60 mL/min/1.73m2   Glucose by meter    Collection Time: 01/14/22  8:30 AM   Result Value Ref Range    GLUCOSE BY METER POCT 116 (H) 70 - 99 mg/dL   Glucose by meter    Collection Time: 01/14/22 12:30 PM   Result Value Ref Range    GLUCOSE BY METER POCT 166 (H) 70 - 99 mg/dL   Glucose by meter    Collection Time: 01/14/22  5:06 PM   Result Value Ref Range    GLUCOSE BY METER POCT 100 (H) 70 - 99 mg/dL   Glucose by meter    Collection Time: 01/14/22  9:45 PM   Result Value Ref Range    GLUCOSE BY METER POCT 92 70 - 99 mg/dL   CBC with platelets     Collection Time: 01/15/22  6:33 AM   Result Value Ref Range    WBC Count 13.3 (H) 4.0 - 11.0 10e3/uL    RBC Count 2.54 (L) 4.40 - 5.90 10e6/uL    Hemoglobin 7.5 (L) 13.3 - 17.7 g/dL    Hematocrit 24.2 (L) 40.0 - 53.0 %    MCV 95 78 - 100 fL    MCH 29.5 26.5 - 33.0 pg    MCHC 31.0 (L) 31.5 - 36.5 g/dL    RDW 18.2 (H) 10.0 - 15.0 %    Platelet Count 343 150 - 450 10e3/uL   Glucose by meter    Collection Time: 01/15/22  8:47 AM   Result Value Ref Range    GLUCOSE BY METER POCT 114 (H) 70 - 99 mg/dL   Glucose by meter    Collection Time: 01/15/22 11:37 AM   Result Value Ref Range    GLUCOSE BY METER POCT 171 (H) 70 - 99 mg/dL   Glucose by meter    Collection Time: 01/15/22  6:03 PM   Result Value Ref Range    GLUCOSE BY METER POCT 195 (H) 70 - 99 mg/dL   Glucose by meter    Collection Time: 01/15/22 10:18 PM   Result Value Ref Range    GLUCOSE BY METER POCT 131 (H) 70 - 99 mg/dL   CBC with platelets    Collection Time: 01/16/22  8:40 AM   Result Value Ref Range    WBC Count 13.5 (H) 4.0 - 11.0 10e3/uL    RBC Count 2.59 (L) 4.40 - 5.90 10e6/uL    Hemoglobin 7.8 (L) 13.3 - 17.7 g/dL    Hematocrit 25.5 (L) 40.0 - 53.0 %    MCV 99 78 - 100 fL    MCH 30.1 26.5 - 33.0 pg    MCHC 30.6 (L) 31.5 - 36.5 g/dL    RDW 18.3 (H) 10.0 - 15.0 %    Platelet Count 377 150 - 450 10e3/uL   Basic metabolic panel    Collection Time: 01/16/22  8:40 AM   Result Value Ref Range    Sodium 139 136 - 145 mmol/L    Potassium 4.5 3.5 - 5.0 mmol/L    Chloride 103 98 - 107 mmol/L    Carbon Dioxide (CO2) 29 22 - 31 mmol/L    Anion Gap 7 5 - 18 mmol/L    Urea Nitrogen 38 (H) 8 - 28 mg/dL    Creatinine 1.71 (H) 0.70 - 1.30 mg/dL    Calcium 8.0 (L) 8.5 - 10.5 mg/dL    Glucose 115 70 - 125 mg/dL    GFR Estimate 41 (L) >60 mL/min/1.73m2   Glucose by meter    Collection Time: 01/16/22  9:18 AM   Result Value Ref Range    GLUCOSE BY METER POCT 111 (H) 70 - 99 mg/dL   Glucose by meter    Collection Time: 01/16/22 12:07 PM   Result Value Ref Range    GLUCOSE  BY METER POCT 133 (H) 70 - 99 mg/dL   Glucose by meter    Collection Time: 01/16/22 12:32 PM   Result Value Ref Range    GLUCOSE BY METER POCT 123 (H) 70 - 99 mg/dL   Glucose by meter    Collection Time: 01/16/22  5:54 PM   Result Value Ref Range    GLUCOSE BY METER POCT 212 (H) 70 - 99 mg/dL   Glucose by meter    Collection Time: 01/16/22  9:14 PM   Result Value Ref Range    GLUCOSE BY METER POCT 197 (H) 70 - 99 mg/dL   Glucose by meter    Collection Time: 01/17/22  7:38 AM   Result Value Ref Range    GLUCOSE BY METER POCT 124 (H) 70 - 99 mg/dL   Glucose by meter    Collection Time: 01/17/22  8:22 AM   Result Value Ref Range    GLUCOSE BY METER POCT 118 (H) 70 - 99 mg/dL   Asymptomatic COVID-19 Virus (Coronavirus) by PCR Nasopharyngeal    Collection Time: 01/17/22  8:56 AM    Specimen: Nasopharyngeal; Swab   Result Value Ref Range    SARS CoV2 PCR Negative Negative   COVID-19 Virus (Coronavirus) by PCR Nose    Collection Time: 01/18/22 12:00 PM    Specimen: Nose; Swab   Result Value Ref Range    SARS CoV2 PCR  Negative     Testing sent to reference lab. Results will be returned via unsolicited result   Basic metabolic panel    Collection Time: 01/19/22  4:50 AM   Result Value Ref Range    Sodium 140 136 - 145 mmol/L    Potassium 4.5 3.5 - 5.0 mmol/L    Chloride 106 98 - 107 mmol/L    Carbon Dioxide (CO2) 27 22 - 31 mmol/L    Anion Gap 7 5 - 18 mmol/L    Urea Nitrogen 45 (H) 8 - 28 mg/dL    Creatinine 1.94 (H) 0.70 - 1.30 mg/dL    Calcium 8.5 8.5 - 10.5 mg/dL    Glucose 132 (H) 70 - 125 mg/dL    GFR Estimate 35 (L) >60 mL/min/1.73m2   CBC with platelets and differential    Collection Time: 01/19/22  4:50 AM   Result Value Ref Range    WBC Count 14.1 (H) 4.0 - 11.0 10e3/uL    RBC Count 2.65 (L) 4.40 - 5.90 10e6/uL    Hemoglobin 7.8 (L) 13.3 - 17.7 g/dL    Hematocrit 26.1 (L) 40.0 - 53.0 %    MCV 99 78 - 100 fL    MCH 29.4 26.5 - 33.0 pg    MCHC 29.9 (L) 31.5 - 36.5 g/dL    RDW 19.9 (H) 10.0 - 15.0 %    Platelet Count  412 150 - 450 10e3/uL   Manual Differential    Collection Time: 01/19/22  4:50 AM   Result Value Ref Range    % Neutrophils 75 %    % Lymphocytes 13 %    % Monocytes 8 %    % Eosinophils 3 %    % Basophils 1 %    Absolute Neutrophils 10.6 (H) 1.6 - 8.3 10e3/uL    Absolute Lymphocytes 1.8 0.8 - 5.3 10e3/uL    Absolute Monocytes 1.1 0.0 - 1.3 10e3/uL    Absolute Eosinophils 0.4 0.0 - 0.7 10e3/uL    Absolute Basophils 0.1 0.0 - 0.2 10e3/uL    RBC Morphology Confirmed RBC Indices     Platelet Assessment  Automated Count Confirmed. Platelet morphology is normal.     Automated Count Confirmed. Platelet morphology is normal.    Acanthocytes Slight (A) None Seen    Elliptocytes Slight (A) None Seen    RBC Fragments Slight (A) None Seen    Polychromasia Slight (A) None Seen       MEDICATIONS:     Review of your medicines          Accurate as of January 18, 2022 11:59 PM. If you have any questions, ask your nurse or doctor.            CONTINUE these medicines which have NOT CHANGED      Dose / Directions   acetaminophen 325 MG tablet  Commonly known as: TYLENOL  Used for: Hematoma of rectus sheath, initial encounter      Dose: 975 mg  Take 3 tablets (975 mg) by mouth 3 times daily  Refills: 0     albuterol 108 (90 Base) MCG/ACT inhaler  Commonly known as: PROAIR HFA/PROVENTIL HFA/VENTOLIN HFA      Dose: 1-6 puff  Inhale 1-6 puffs into the lungs every 6 hours as needed for shortness of breath / dyspnea or wheezing  Refills: 0     atorvastatin 80 MG tablet  Commonly known as: LIPITOR      Dose: 80 mg  Take 80 mg by mouth At Bedtime  Refills: 0     busPIRone 7.5 MG tablet  Commonly known as: BUSPAR      Dose: 7.5 mg  Take 7.5 mg by mouth 2 times daily  Refills: 0     carbidopa-levodopa  MG tablet  Commonly known as: SINEMET      Dose: 1 tablet  Take 1 tablet by mouth 2 times daily  Refills: 0     carvedilol 6.25 MG tablet  Commonly known as: COREG      Dose: 6.25 mg  Take 6.25 mg by mouth 2 times daily (with  meals)  Refills: 0     diclofenac 1 % topical gel  Commonly known as: VOLTAREN      Dose: 2 g  Apply 2 g topically 2 times daily as needed for moderate pain  Refills: 0     DULoxetine 20 MG capsule  Commonly known as: CYMBALTA      Dose: 40 mg  Take 40 mg by mouth daily  Refills: 0     ferrous sulfate 325 (65 Fe) MG tablet  Commonly known as: FEROSUL      Dose: 325 mg  Take 325 mg by mouth daily (with breakfast)  Refills: 0     fish oil-omega-3 fatty acids 1000 MG capsule      Dose: 1 g  Take 1 g by mouth daily  Refills: 0     fluticasone 50 MCG/ACT nasal spray  Commonly known as: FLONASE      Dose: 2 spray  Spray 2 sprays into both nostrils daily  Refills: 0     Fluticasone-Umeclidin-Vilanterol 100-62.5-25 MCG/INH oral inhaler  Commonly known as: TRELEGY ELLIPTA      Dose: 1 puff  Inhale 1 puff into the lungs daily  Refills: 0     gabapentin 400 MG capsule  Commonly known as: NEURONTIN      Dose: 400 mg  Take 400 mg by mouth 2 times daily  Refills: 0     HYDROmorphone 2 MG tablet  Commonly known as: DILAUDID  Used for: Hematoma of rectus sheath, initial encounter      Dose: 1 mg  Take 0.5 tablets (1 mg) by mouth every 6 hours as needed for moderate to severe pain  Quantity: 10 tablet  Refills: 0     melatonin 3 MG tablet      Dose: 9 mg  Take 9 mg by mouth At Bedtime  Refills: 0     nitroGLYcerin 0.4 MG sublingual tablet  Commonly known as: NITROSTAT      Dose: 0.4 mg  Place 0.4 mg under the tongue every 5 minutes as needed for chest pain For chest pain place 1 tablet under the tongue every 5 minutes for 3 doses. If symptoms persist 5 minutes after 1st dose call 911.  Refills: 0     pantoprazole 40 MG EC tablet  Commonly known as: PROTONIX      Dose: 40 mg  Take 40 mg by mouth daily  Refills: 0     polyethylene glycol 17 g packet  Commonly known as: MIRALAX  Used for: Constipation, unspecified constipation type      Dose: 17 g  Take 17 g by mouth daily  Refills: 0     senna-docusate 8.6-50 MG tablet  Commonly known  as: SENOKOT-S/PERICOLACE      Dose: 2 tablet  Take 2 tablets by mouth At Bedtime  Refills: 0     tamsulosin 0.4 MG capsule  Commonly known as: FLOMAX      Dose: 0.4 mg  Take 0.4 mg by mouth At Bedtime  Refills: 0     torsemide 20 MG tablet  Commonly known as: DEMADEX  Used for: Chronic right-sided congestive heart failure (H)      Dose: 40 mg  Take 2 tablets (40 mg) by mouth daily  Refills: 0            ASSESSMENT/PLAN  Encounter Diagnoses   Name Primary?     Influenza A Yes     Pneumonia of right lower lobe due to infectious organism      Congestive heart failure, unspecified HF chronicity, unspecified heart failure type (H)      Physical deconditioning      Acute on chronic hypoxic respiratory failure   Treated with diuretics Tamiflu steroids and nebs.  Continue 5 L nasal cannula continuously, COVID-negative per hospital report       Acute influenza A infection/pneumonia  Completed antibiotics and Tamiflu     Acute blood loss anemia   Patient with a right thigh hematoma monitor labs last hemoglobin 7.8, Xarelto on hold, continue ferrous sulfate     COPD   On albuterol, DuoNeb and Breo ellipta continue home oxygen 5 L per nasal cannula.  He completed 5 days of steroids     CHF Daily weights, continue carvedilol,  torsemide dose decreased in the hospital due to low blood pressure     Coronary artery disease continue atorvastatin, coreg     Diabetes mellitus type 2  Diet controlled  Last hemoglobin A1c 6.3     Parkinson's disease On  Sinemet     History of  atrial fibrillation  On  Coreg.  Hold Xarelto on discharge for at least 2 weeks per hospitsal records    Right thigh pain - chronic per patient extracted from chart   Pt reports R thigh pain which is chronic, worsened in last few days. No injury. Xray R femur is negative for fracture CT femur shows hematoma but no fracture    Physical deconditioning PT OT    Pain management as needed Dilaudid, scheduled Tylenol and as needed Voltaren gel      Electronically signed  by: Karin Srivastava, SORAIDA        Sincerely,        Karin Srivastava, CNP

## 2022-01-18 NOTE — PROGRESS NOTES
[facility] GERIATRIC SERVICES  Chief Complaint   Patient presents with     [facility] F/U     [place] Medical Record Number:  [id number]  Place of Service where encounter took place:  [facility] ([facility]) [[id number]]  Code Status:  Full    HISTORY:      HPI:  Atif Romero  is 76 year old (1945) undergoing physical and occupational therapy. He is  with past medical history of CHF, COPD with CRF, coronary artery disease, atrial fibrillation brought in to the emergency room department by EMS for further work-up and evaluation of hypoxia and worsening shortness of breath and cough. Found to have signs concerning for lobar pneumonia and positive influenza A.     Patient seen today to review vital signs, labs follow-up pneumonia and to establish care.  He denied chest pain.  He does have chronic shortness of breath and is on 5 L nasal cannula continuously.  Denied constipation diarrhea.  Labs reviewed hemoglobin on 1/16 7.8 with a white blood cell count of 13.5 he does have labs pending.  He is with a right thigh hematoma it appears possibly related to his Xarelto which is currently on hold.  He rates his pain  8 out of 10.  He is with 3+ edema right leg and it is ecchymotic from his hip to his ankle.  His main concern today was some nasal congestion.  Ocean nasal spray ordered as needed, okay to leave at the bedside.  He is on as needed Dilaudid for pain.    ALLERGIES:Patient has no known allergies.    PAST MEDICAL HISTORY: History reviewed. No pertinent past medical history.    PAST SURGICAL HISTORY:   has a past surgical history that includes IR Lumbar Epidural Steroid Injection (6/1/2006); IR Lumbar Epidural Steroid Injection (7/26/2006); IR Lumbar Epidural Steroid Injection (10/11/2006); IR Lumbar Epidural Steroid Injection (8/15/2007); IR Lumbar Epidural Steroid Injection (12/7/2007); IR Lumbar Epidural Steroid Injection (4/15/2008); Bypass graft artery coronary; back surgery; joint replacement;  "and Splenectomy, Total.    FAMILY HISTORY: family history is not on file.    SOCIAL HISTORY:  reports that he has been smoking. He has never used smokeless tobacco.    ROS:  Constitutional: Negative for activity change, appetite change, fatigue and fever.   HENT: Negative for congestion.    Respiratory: Negative for cough, shortness of breath and wheezing. 5 L NC continuous (home dose)   Cardiovascular: Negative for chest pain and leg swelling.   Gastrointestinal: Negative for abdominal distention, abdominal pain, constipation, diarrhea and nausea.   Genitourinary: Negative for dysuria.   Musculoskeletal: Negative for arthralgia. Negative for back pain.   Skin: Negative for color change and wound.  Right leg ecchymotic from hip to ankle related to a right thigh hematoma  Neurological: Negative for dizziness.   Psychiatric/Behavioral: Negative for agitation, behavioral problems and confusion.     Physical Exam:  Constitutional:       Appearance: Patient is well-developed.   HENT:      Head: Normocephalic.   Eyes:      Conjunctiva/sclera: Conjunctivae normal.   Neck:      Musculoskeletal: Normal range of motion.   Cardiovascular:      Rate and Rhythm: Normal rate and regular rhythm.      Heart sounds: Normal heart sounds. No murmur.   Pulmonary:      Effort: No respiratory distress.      Breath sounds: Normal breath sounds. No wheezing or rales.   Abdominal:      General: Bowel sounds are normal. There is no distension.      Palpations: Abdomen is soft.      Tenderness: There is no abdominal tenderness.   Musculoskeletal:       Normal range of motion.     Skin:General:        Skin is warm. Ecchymosis from right hip to ankle- Hospital CT scan showed a rectus sheath hematoma    Neurological:         Mental Status: Patient is alert and oriented to person, place, and time.   Psychiatric:         Behavior: Behavior normal.     Vitals:/46   Pulse 62   Temp 98.7  F (37.1  C)   Resp 20   Ht 1.702 m (5' 7\")   Wt " 79.7 kg (175 lb 12.8 oz)   SpO2 94%   BMI 27.53 kg/m   and Body mass index is 27.53 kg/m .    Lab/Diagnostic data:   Recent Results (from the past 240 hour(s))   Glucose by meter    Collection Time: 01/09/22  5:00 PM   Result Value Ref Range    GLUCOSE BY METER POCT 250 (H) 70 - 99 mg/dL   Glucose by meter    Collection Time: 01/09/22  9:21 PM   Result Value Ref Range    GLUCOSE BY METER POCT 134 (H) 70 - 99 mg/dL   Lactic Acid STAT    Collection Time: 01/10/22  4:43 AM   Result Value Ref Range    Lactic Acid 0.9 0.7 - 2.0 mmol/L   Extra Red Top Tube    Collection Time: 01/10/22  4:43 AM   Result Value Ref Range    Hold Specimen JIC    Extra Purple Top Tube    Collection Time: 01/10/22  4:43 AM   Result Value Ref Range    Hold Specimen JIC    Procalcitonin    Collection Time: 01/10/22  4:43 AM   Result Value Ref Range    Procalcitonin 0.07 0.00 - 0.49 ng/mL   Glucose by meter    Collection Time: 01/10/22  8:25 AM   Result Value Ref Range    GLUCOSE BY METER POCT 113 (H) 70 - 99 mg/dL   Glucose by meter    Collection Time: 01/10/22 12:50 PM   Result Value Ref Range    GLUCOSE BY METER POCT 226 (H) 70 - 99 mg/dL   Glucose by meter    Collection Time: 01/10/22  5:06 PM   Result Value Ref Range    GLUCOSE BY METER POCT 230 (H) 70 - 99 mg/dL   Glucose by meter    Collection Time: 01/10/22  9:09 PM   Result Value Ref Range    GLUCOSE BY METER POCT 204 (H) 70 - 99 mg/dL   CBC with platelets    Collection Time: 01/11/22  6:24 AM   Result Value Ref Range    WBC Count 18.4 (H) 4.0 - 11.0 10e3/uL    RBC Count 2.04 (L) 4.40 - 5.90 10e6/uL    Hemoglobin 5.7 (LL) 13.3 - 17.7 g/dL    Hematocrit 18.4 (L) 40.0 - 53.0 %    MCV 90 78 - 100 fL    MCH 27.9 26.5 - 33.0 pg    MCHC 31.0 (L) 31.5 - 36.5 g/dL    RDW 18.8 (H) 10.0 - 15.0 %    Platelet Count 223 150 - 450 10e3/uL   Basic metabolic panel    Collection Time: 01/11/22  6:24 AM   Result Value Ref Range    Sodium 144 136 - 145 mmol/L    Potassium 3.6 3.5 - 5.0 mmol/L    Chloride  104 98 - 107 mmol/L    Carbon Dioxide (CO2) 30 22 - 31 mmol/L    Anion Gap 10 5 - 18 mmol/L    Urea Nitrogen 39 (H) 8 - 28 mg/dL    Creatinine 2.09 (H) 0.70 - 1.30 mg/dL    Calcium 7.7 (L) 8.5 - 10.5 mg/dL    Glucose 130 (H) 70 - 125 mg/dL    GFR Estimate 32 (L) >60 mL/min/1.73m2   Glucose by meter    Collection Time: 01/11/22  8:27 AM   Result Value Ref Range    GLUCOSE BY METER POCT 124 (H) 70 - 99 mg/dL   Hemoglobin    Collection Time: 01/11/22  9:00 AM   Result Value Ref Range    Hemoglobin 5.6 (LL) 13.3 - 17.7 g/dL   Adult Type and Screen    Collection Time: 01/11/22  9:00 AM   Result Value Ref Range    ABO/RH(D) AB POS     Antibody Screen Negative Negative    SPECIMEN EXPIRATION DATE [id number]    Prepare red blood cells (unit)    Collection Time: 01/11/22  9:45 AM   Result Value Ref Range    CROSSMATCH Compatible     UNIT ABO/RH AB Pos     Unit Number [id number]     Unit Status Transfused     Blood Component Type Red Blood Cells     Product Code [id number]     CODING SYSTEM [id number]     UNIT TYPE ISBT 8400     ISSUE DATE AND TIME [id number]    Glucose by meter    Collection Time: 01/11/22 12:08 PM   Result Value Ref Range    GLUCOSE BY METER POCT 170 (H) 70 - 99 mg/dL   Glucose by meter    Collection Time: 01/11/22  4:50 PM   Result Value Ref Range    GLUCOSE BY METER POCT 253 (H) 70 - 99 mg/dL   Hemoglobin    Collection Time: 01/11/22  5:06 PM   Result Value Ref Range    Hemoglobin 6.4 (LL) 13.3 - 17.7 g/dL   Lactic Acid STAT    Collection Time: 01/11/22  5:06 PM   Result Value Ref Range    Lactic Acid 1.5 0.7 - 2.0 mmol/L   Extra Red Top Tube    Collection Time: 01/11/22  5:13 PM   Result Value Ref Range    Hold Specimen JIC    Prepare red blood cells (unit)    Collection Time: 01/11/22  6:15 PM   Result Value Ref Range    CROSSMATCH Compatible     UNIT ABO/RH B Pos     Unit Number [id number]     Unit Status Transfused     Blood Component Type Red Blood Cells     Product Code [id number]     CODING  SYSTEM [id number]     UNIT TYPE ISBT 7300     ISSUE DATE AND TIME [id number]    Glucose by meter    Collection Time: 01/11/22  8:11 PM   Result Value Ref Range    GLUCOSE BY METER POCT 251 (H) 70 - 99 mg/dL   CBC with platelets    Collection Time: 01/12/22  6:22 AM   Result Value Ref Range    WBC Count 19.1 (H) 4.0 - 11.0 10e3/uL    RBC Count 2.62 (L) 4.40 - 5.90 10e6/uL    Hemoglobin 7.7 (L) 13.3 - 17.7 g/dL    Hematocrit 27.2 (L) 40.0 - 53.0 %     (H) 78 - 100 fL    MCH 29.4 26.5 - 33.0 pg    MCHC 28.3 (L) 31.5 - 36.5 g/dL    RDW 16.8 (H) 10.0 - 15.0 %    Platelet Count 227 150 - 450 10e3/uL   Basic metabolic panel    Collection Time: 01/12/22  6:22 AM   Result Value Ref Range    Sodium 139 136 - 145 mmol/L    Potassium 4.1 3.5 - 5.0 mmol/L    Chloride 101 98 - 107 mmol/L    Carbon Dioxide (CO2) 30 22 - 31 mmol/L    Anion Gap 8 5 - 18 mmol/L    Urea Nitrogen 39 (H) 8 - 28 mg/dL    Creatinine 2.05 (H) 0.70 - 1.30 mg/dL    Calcium 7.8 (L) 8.5 - 10.5 mg/dL    Glucose 156 (H) 70 - 125 mg/dL    GFR Estimate 33 (L) >60 mL/min/1.73m2   Glucose by meter    Collection Time: 01/12/22  8:14 AM   Result Value Ref Range    GLUCOSE BY METER POCT 138 (H) 70 - 99 mg/dL   Glucose by meter    Collection Time: 01/12/22 11:33 AM   Result Value Ref Range    GLUCOSE BY METER POCT 182 (H) 70 - 99 mg/dL   Glucose by meter    Collection Time: 01/12/22  4:51 PM   Result Value Ref Range    GLUCOSE BY METER POCT 166 (H) 70 - 99 mg/dL   Lactic Acid STAT    Collection Time: 01/12/22  6:16 PM   Result Value Ref Range    Lactic Acid 1.8 0.7 - 2.0 mmol/L   Asymptomatic COVID-19 Virus (Coronavirus) by PCR Nasopharyngeal    Collection Time: 01/12/22  6:28 PM    Specimen: Nasopharyngeal; Swab   Result Value Ref Range    SARS CoV2 PCR Negative Negative   Glucose by meter    Collection Time: 01/12/22 10:14 PM   Result Value Ref Range    GLUCOSE BY METER POCT 171 (H) 70 - 99 mg/dL   Glucose by meter    Collection Time: 01/13/22  1:59 AM    Result Value Ref Range    GLUCOSE BY METER POCT 165 (H) 70 - 99 mg/dL   CBC with platelets    Collection Time: 01/13/22  6:07 AM   Result Value Ref Range    WBC Count 18.7 (H) 4.0 - 11.0 10e3/uL    RBC Count 2.33 (L) 4.40 - 5.90 10e6/uL    Hemoglobin 6.8 (LL) 13.3 - 17.7 g/dL    Hematocrit 21.8 (L) 40.0 - 53.0 %    MCV 94 78 - 100 fL    MCH 29.2 26.5 - 33.0 pg    MCHC 31.2 (L) 31.5 - 36.5 g/dL    RDW 17.6 (H) 10.0 - 15.0 %    Platelet Count 277 150 - 450 10e3/uL   Comprehensive metabolic panel    Collection Time: 01/13/22  6:07 AM   Result Value Ref Range    Sodium 140 136 - 145 mmol/L    Potassium 4.3 3.5 - 5.0 mmol/L    Chloride 103 98 - 107 mmol/L    Carbon Dioxide (CO2) 30 22 - 31 mmol/L    Anion Gap 7 5 - 18 mmol/L    Urea Nitrogen 35 (H) 8 - 28 mg/dL    Creatinine 1.67 (H) 0.70 - 1.30 mg/dL    Calcium 7.9 (L) 8.5 - 10.5 mg/dL    Glucose 125 70 - 125 mg/dL    Alkaline Phosphatase 160 (H) 45 - 120 U/L    AST 21 0 - 40 U/L    ALT <9 0 - 45 U/L    Protein Total 5.3 (L) 6.0 - 8.0 g/dL    Albumin 2.5 (L) 3.5 - 5.0 g/dL    Bilirubin Total 1.1 (H) 0.0 - 1.0 mg/dL    GFR Estimate 42 (L) >60 mL/min/1.73m2   Prepare red blood cells (unit)    Collection Time: 01/13/22  7:00 AM   Result Value Ref Range    CROSSMATCH Compatible     UNIT ABO/RH AB Pos     Unit Number [id number]     Unit Status Transfused     Blood Component Type Red Blood Cells     Product Code [id number]     CODING SYSTEM [id number]     UNIT TYPE ISBT 8400     ISSUE DATE AND TIME [id number]    Glucose by meter    Collection Time: 01/13/22  7:52 AM   Result Value Ref Range    GLUCOSE BY METER POCT 111 (H) 70 - 99 mg/dL   Glucose by meter    Collection Time: 01/13/22 12:01 PM   Result Value Ref Range    GLUCOSE BY METER POCT 106 (H) 70 - 99 mg/dL   Glucose by meter    Collection Time: 01/13/22  4:49 PM   Result Value Ref Range    GLUCOSE BY METER POCT 167 (H) 70 - 99 mg/dL   Occult blood stool    Collection Time: 01/13/22  5:43 PM   Result Value Ref Range     Occult Blood Negative Negative   Glucose by meter    Collection Time: 01/13/22  8:55 PM   Result Value Ref Range    GLUCOSE BY METER POCT 190 (H) 70 - 99 mg/dL   Lactic Acid STAT    Collection Time: 01/13/22 11:52 PM   Result Value Ref Range    Lactic Acid 1.0 0.7 - 2.0 mmol/L   CBC with platelets    Collection Time: 01/14/22  6:24 AM   Result Value Ref Range    WBC Count 16.1 (H) 4.0 - 11.0 10e3/uL    RBC Count 2.51 (L) 4.40 - 5.90 10e6/uL    Hemoglobin 7.4 (L) 13.3 - 17.7 g/dL    Hematocrit 23.5 (L) 40.0 - 53.0 %    MCV 94 78 - 100 fL    MCH 29.5 26.5 - 33.0 pg    MCHC 31.5 31.5 - 36.5 g/dL    RDW 17.5 (H) 10.0 - 15.0 %    Platelet Count 309 150 - 450 10e3/uL   Comprehensive metabolic panel    Collection Time: 01/14/22  6:24 AM   Result Value Ref Range    Sodium 141 136 - 145 mmol/L    Potassium 4.2 3.5 - 5.0 mmol/L    Chloride 105 98 - 107 mmol/L    Carbon Dioxide (CO2) 30 22 - 31 mmol/L    Anion Gap 6 5 - 18 mmol/L    Urea Nitrogen 35 (H) 8 - 28 mg/dL    Creatinine 1.52 (H) 0.70 - 1.30 mg/dL    Calcium 7.8 (L) 8.5 - 10.5 mg/dL    Glucose 117 70 - 125 mg/dL    Alkaline Phosphatase 162 (H) 45 - 120 U/L    AST 16 0 - 40 U/L    ALT <9 0 - 45 U/L    Protein Total 5.3 (L) 6.0 - 8.0 g/dL    Albumin 2.5 (L) 3.5 - 5.0 g/dL    Bilirubin Total 1.7 (H) 0.0 - 1.0 mg/dL    GFR Estimate 47 (L) >60 mL/min/1.73m2   Glucose by meter    Collection Time: 01/14/22  8:30 AM   Result Value Ref Range    GLUCOSE BY METER POCT 116 (H) 70 - 99 mg/dL   Glucose by meter    Collection Time: 01/14/22 12:30 PM   Result Value Ref Range    GLUCOSE BY METER POCT 166 (H) 70 - 99 mg/dL   Glucose by meter    Collection Time: 01/14/22  5:06 PM   Result Value Ref Range    GLUCOSE BY METER POCT 100 (H) 70 - 99 mg/dL   Glucose by meter    Collection Time: 01/14/22  9:45 PM   Result Value Ref Range    GLUCOSE BY METER POCT 92 70 - 99 mg/dL   CBC with platelets    Collection Time: 01/15/22  6:33 AM   Result Value Ref Range    WBC Count 13.3 (H) 4.0 -  11.0 10e3/uL    RBC Count 2.54 (L) 4.40 - 5.90 10e6/uL    Hemoglobin 7.5 (L) 13.3 - 17.7 g/dL    Hematocrit 24.2 (L) 40.0 - 53.0 %    MCV 95 78 - 100 fL    MCH 29.5 26.5 - 33.0 pg    MCHC 31.0 (L) 31.5 - 36.5 g/dL    RDW 18.2 (H) 10.0 - 15.0 %    Platelet Count 343 150 - 450 10e3/uL   Glucose by meter    Collection Time: 01/15/22  8:47 AM   Result Value Ref Range    GLUCOSE BY METER POCT 114 (H) 70 - 99 mg/dL   Glucose by meter    Collection Time: 01/15/22 11:37 AM   Result Value Ref Range    GLUCOSE BY METER POCT 171 (H) 70 - 99 mg/dL   Glucose by meter    Collection Time: 01/15/22  6:03 PM   Result Value Ref Range    GLUCOSE BY METER POCT 195 (H) 70 - 99 mg/dL   Glucose by meter    Collection Time: 01/15/22 10:18 PM   Result Value Ref Range    GLUCOSE BY METER POCT 131 (H) 70 - 99 mg/dL   CBC with platelets    Collection Time: 01/16/22  8:40 AM   Result Value Ref Range    WBC Count 13.5 (H) 4.0 - 11.0 10e3/uL    RBC Count 2.59 (L) 4.40 - 5.90 10e6/uL    Hemoglobin 7.8 (L) 13.3 - 17.7 g/dL    Hematocrit 25.5 (L) 40.0 - 53.0 %    MCV 99 78 - 100 fL    MCH 30.1 26.5 - 33.0 pg    MCHC 30.6 (L) 31.5 - 36.5 g/dL    RDW 18.3 (H) 10.0 - 15.0 %    Platelet Count 377 150 - 450 10e3/uL   Basic metabolic panel    Collection Time: 01/16/22  8:40 AM   Result Value Ref Range    Sodium 139 136 - 145 mmol/L    Potassium 4.5 3.5 - 5.0 mmol/L    Chloride 103 98 - 107 mmol/L    Carbon Dioxide (CO2) 29 22 - 31 mmol/L    Anion Gap 7 5 - 18 mmol/L    Urea Nitrogen 38 (H) 8 - 28 mg/dL    Creatinine 1.71 (H) 0.70 - 1.30 mg/dL    Calcium 8.0 (L) 8.5 - 10.5 mg/dL    Glucose 115 70 - 125 mg/dL    GFR Estimate 41 (L) >60 mL/min/1.73m2   Glucose by meter    Collection Time: 01/16/22  9:18 AM   Result Value Ref Range    GLUCOSE BY METER POCT 111 (H) 70 - 99 mg/dL   Glucose by meter    Collection Time: 01/16/22 12:07 PM   Result Value Ref Range    GLUCOSE BY METER POCT 133 (H) 70 - 99 mg/dL   Glucose by meter    Collection Time: 01/16/22 12:32  PM   Result Value Ref Range    GLUCOSE BY METER POCT 123 (H) 70 - 99 mg/dL   Glucose by meter    Collection Time: 01/16/22  5:54 PM   Result Value Ref Range    GLUCOSE BY METER POCT 212 (H) 70 - 99 mg/dL   Glucose by meter    Collection Time: 01/16/22  9:14 PM   Result Value Ref Range    GLUCOSE BY METER POCT 197 (H) 70 - 99 mg/dL   Glucose by meter    Collection Time: 01/17/22  7:38 AM   Result Value Ref Range    GLUCOSE BY METER POCT 124 (H) 70 - 99 mg/dL   Glucose by meter    Collection Time: 01/17/22  8:22 AM   Result Value Ref Range    GLUCOSE BY METER POCT 118 (H) 70 - 99 mg/dL   Asymptomatic COVID-19 Virus (Coronavirus) by PCR Nasopharyngeal    Collection Time: 01/17/22  8:56 AM    Specimen: Nasopharyngeal; Swab   Result Value Ref Range    SARS CoV2 PCR Negative Negative   COVID-19 Virus (Coronavirus) by PCR Nose    Collection Time: 01/18/22 12:00 PM    Specimen: Nose; Swab   Result Value Ref Range    SARS CoV2 PCR  Negative     Testing sent to reference lab. Results will be returned via unsolicited result   Basic metabolic panel    Collection Time: 01/19/22  4:50 AM   Result Value Ref Range    Sodium 140 136 - 145 mmol/L    Potassium 4.5 3.5 - 5.0 mmol/L    Chloride 106 98 - 107 mmol/L    Carbon Dioxide (CO2) 27 22 - 31 mmol/L    Anion Gap 7 5 - 18 mmol/L    Urea Nitrogen 45 (H) 8 - 28 mg/dL    Creatinine 1.94 (H) 0.70 - 1.30 mg/dL    Calcium 8.5 8.5 - 10.5 mg/dL    Glucose 132 (H) 70 - 125 mg/dL    GFR Estimate 35 (L) >60 mL/min/1.73m2   CBC with platelets and differential    Collection Time: 01/19/22  4:50 AM   Result Value Ref Range    WBC Count 14.1 (H) 4.0 - 11.0 10e3/uL    RBC Count 2.65 (L) 4.40 - 5.90 10e6/uL    Hemoglobin 7.8 (L) 13.3 - 17.7 g/dL    Hematocrit 26.1 (L) 40.0 - 53.0 %    MCV 99 78 - 100 fL    MCH 29.4 26.5 - 33.0 pg    MCHC 29.9 (L) 31.5 - 36.5 g/dL    RDW 19.9 (H) 10.0 - 15.0 %    Platelet Count 412 150 - 450 10e3/uL   Manual Differential    Collection Time: 01/19/22  4:50 AM    Result Value Ref Range    % Neutrophils 75 %    % Lymphocytes 13 %    % Monocytes 8 %    % Eosinophils 3 %    % Basophils 1 %    Absolute Neutrophils 10.6 (H) 1.6 - 8.3 10e3/uL    Absolute Lymphocytes 1.8 0.8 - 5.3 10e3/uL    Absolute Monocytes 1.1 0.0 - 1.3 10e3/uL    Absolute Eosinophils 0.4 0.0 - 0.7 10e3/uL    Absolute Basophils 0.1 0.0 - 0.2 10e3/uL    RBC Morphology Confirmed RBC Indices     Platelet Assessment  Automated Count Confirmed. Platelet morphology is normal.     Automated Count Confirmed. Platelet morphology is normal.    Acanthocytes Slight (A) None Seen    Elliptocytes Slight (A) None Seen    RBC Fragments Slight (A) None Seen    Polychromasia Slight (A) None Seen       MEDICATIONS:     Review of your medicines          Accurate as of January 18, 2022 11:59 PM. If you have any questions, ask your nurse or doctor.            CONTINUE these medicines which have NOT CHANGED      Dose / Directions   acetaminophen 325 MG tablet  Commonly known as: TYLENOL  Used for: Hematoma of rectus sheath, initial encounter      Dose: 975 mg  Take 3 tablets (975 mg) by mouth 3 times daily  Refills: 0     albuterol 108 (90 Base) MCG/ACT inhaler  Commonly known as: PROAIR HFA/PROVENTIL HFA/VENTOLIN HFA      Dose: 1-6 puff  Inhale 1-6 puffs into the lungs every 6 hours as needed for shortness of breath / dyspnea or wheezing  Refills: 0     atorvastatin 80 MG tablet  Commonly known as: LIPITOR      Dose: 80 mg  Take 80 mg by mouth At Bedtime  Refills: 0     busPIRone 7.5 MG tablet  Commonly known as: BUSPAR      Dose: 7.5 mg  Take 7.5 mg by mouth 2 times daily  Refills: 0     carbidopa-levodopa  MG tablet  Commonly known as: SINEMET      Dose: 1 tablet  Take 1 tablet by mouth 2 times daily  Refills: 0     carvedilol 6.25 MG tablet  Commonly known as: COREG      Dose: 6.25 mg  Take 6.25 mg by mouth 2 times daily (with meals)  Refills: 0     diclofenac 1 % topical gel  Commonly known as: VOLTAREN      Dose: 2  g  Apply 2 g topically 2 times daily as needed for moderate pain  Refills: 0     DULoxetine 20 MG capsule  Commonly known as: CYMBALTA      Dose: 40 mg  Take 40 mg by mouth daily  Refills: 0     ferrous sulfate 325 (65 Fe) MG tablet  Commonly known as: FEROSUL      Dose: 325 mg  Take 325 mg by mouth daily (with breakfast)  Refills: 0     fish oil-omega-3 fatty acids 1000 MG capsule      Dose: 1 g  Take 1 g by mouth daily  Refills: 0     fluticasone 50 MCG/ACT nasal spray  Commonly known as: FLONASE      Dose: 2 spray  Spray 2 sprays into both nostrils daily  Refills: 0     Fluticasone-Umeclidin-Vilanterol 100-62.5-25 MCG/INH oral inhaler  Commonly known as: TRELEGY ELLIPTA      Dose: 1 puff  Inhale 1 puff into the lungs daily  Refills: 0     gabapentin 400 MG capsule  Commonly known as: NEURONTIN      Dose: 400 mg  Take 400 mg by mouth 2 times daily  Refills: 0     HYDROmorphone 2 MG tablet  Commonly known as: DILAUDID  Used for: Hematoma of rectus sheath, initial encounter      Dose: 1 mg  Take 0.5 tablets (1 mg) by mouth every 6 hours as needed for moderate to severe pain  Quantity: 10 tablet  Refills: 0     melatonin 3 MG tablet      Dose: 9 mg  Take 9 mg by mouth At Bedtime  Refills: 0     nitroGLYcerin 0.4 MG sublingual tablet  Commonly known as: NITROSTAT      Dose: 0.4 mg  Place 0.4 mg under the tongue every 5 minutes as needed for chest pain For chest pain place 1 tablet under the tongue every 5 minutes for 3 doses. If symptoms persist 5 minutes after 1st dose call 911.  Refills: 0     pantoprazole 40 MG EC tablet  Commonly known as: PROTONIX      Dose: 40 mg  Take 40 mg by mouth daily  Refills: 0     polyethylene glycol 17 g packet  Commonly known as: MIRALAX  Used for: Constipation, unspecified constipation type      Dose: 17 g  Take 17 g by mouth daily  Refills: 0     senna-docusate 8.6-50 MG tablet  Commonly known as: SENOKOT-S/PERICOLACE      Dose: 2 tablet  Take 2 tablets by mouth At Bedtime  Refills:  0     tamsulosin 0.4 MG capsule  Commonly known as: FLOMAX      Dose: 0.4 mg  Take 0.4 mg by mouth At Bedtime  Refills: 0     torsemide 20 MG tablet  Commonly known as: DEMADEX  Used for: Chronic right-sided congestive heart failure (H)      Dose: 40 mg  Take 2 tablets (40 mg) by mouth daily  Refills: 0            ASSESSMENT/PLAN  Encounter Diagnoses   Name Primary?     Influenza A Yes     Pneumonia of right lower lobe due to infectious organism      Congestive heart failure, unspecified HF chronicity, unspecified heart failure type (H)      Physical deconditioning      Acute on chronic hypoxic respiratory failure   Treated with diuretics Tamiflu steroids and nebs.  Continue 5 L nasal cannula continuously, COVID-negative per hospital report       Acute influenza A infection/pneumonia  Completed antibiotics and Tamiflu     Acute blood loss anemia   Patient with a right thigh hematoma monitor labs last hemoglobin 7.8, Xarelto on hold, continue ferrous sulfate     COPD   On albuterol, DuoNeb and Breo ellipta continue home oxygen 5 L per nasal cannula.  He completed 5 days of steroids     CHF Daily weights, continue carvedilol,  torsemide dose decreased in the hospital due to low blood pressure     Coronary artery disease continue atorvastatin, coreg     Diabetes mellitus type 2  Diet controlled  Last hemoglobin A1c 6.3     Parkinson's disease On  Sinemet     History of  atrial fibrillation  On  Coreg.  Hold Xarelto on discharge for at least 2 weeks per hospitsal records    Right thigh pain - chronic per patient extracted from chart   Pt reports R thigh pain which is chronic, worsened in last few days. No injury. Xray R femur is negative for fracture CT femur shows hematoma but no fracture    Physical deconditioning PT OT    Pain management as needed Dilaudid, scheduled Tylenol and as needed Voltaren gel      Electronically signed by: Karin Srivastava CNP

## 2022-01-24 NOTE — TELEPHONE ENCOUNTER
[facility] Triage Nurse Telephone Encounter    Provider: Sia Willett MD  Facility: [facility] Type:  TCU    Caller: Jazlyn  Call Back Number: [phone]    Allergies:  No Known Allergies     Reason for call: Nurse calling to update about a weight gain.  Weight today-180.6#(after breakfast), 1/.8#, 1/.3#, 1/#, 1/#.  Patient is also c/o SOB and has crackles in bilateral lung bases.  Nurse is also reporting RLE edema 1+ pitting and trace LLE edema.  He does not wear any LE compression.  The nurse is reporting that over the night, patient's O2 sat was 75% on 5L/min, however it was discovered that the O2 tubing was kinked by the bed frame.  After the kink was fixed, the O2 was increased to 6L/min and O2 sat went up to 93%.  Shortly thereafter, O2 sat was 100% on 6L/min, so the nurse turned O2 down to 5L/min and sat is now 95% on 5L/min.  Other VS:  T=98.5, P=58, R=20, TC=875/60.  Patient is not coughing, however the nurse had the patient cough to try to clear the crackle in bilateral lower lobes and after coughing the crackles are still heard.  Notable meds:  Protonix 40mg daily, Trelegy Elipta 1 puff daily, Torsemide 40mg daily, Sinemet 10/100 BID, Flomax 0.4mg Q HS, Gabapentin 400mg BID, Buspar 7.5mg BID, Coreg 6.25mg BID, Cymbalta 40mg daily, ferrous sulfate 325mg daily.      Verbal Order/Direction given by Provider: Duo Neb QID.  Albuterol neb Q 4 hours PRN.  Re-weigh patient in the AM before breakfast.      Provider giving Order:  Sia Willett MD    Verbal Order given to: Emily Guevara RN

## 2022-01-27 NOTE — LETTER
1/27/2022        RE: Atif Romero  [address] [place] [address]          [facility]  [place] Medical Record Number:  [id number]  Place of Service where encounter took place: [facility] ([facility]) [[id number]]   CODE STATUS:   CPR/Full code     Chief Complaint/Reason for Visit:  Chief Complaint   Patient presents with     RECHECK     TCU 1/27/2022. Influenza A pneumonia, weakness, rectus sheath hematoma, anemia, COPD, CHF.        TCU HPI:    Atif Romero is a 76 year old male with hx of CHF, COPD, CAD, afib on xarelto, CKD, Parkinsons disease, admitted to the hospital on 1/6/2022 with SOB, cough and hypoxia. His discharge summary from complex hospitalization is partially excerpted below.     [facility]  Hospitalist Discharge Summary    Date of Admission:  1/6/2022  Date of Discharge:  1/17/2022     Hospital Course  76 year old male with past medical history of CHF, COPD with CRF, coronary artery disease, atrial fibrillation brought in to the emergency room department by EMS for further work-up and evaluation of hypoxia and worsening shortness of breath and cough. Found to have signs concerning for lobar pneumonia and positive influenza A.  He was admitted, please refer to H&P for details     Acute on chronic hypoxic respiratory failure   - resolved  - Suspect multifactorial and mainly driven by respiratory infection, influenza, COPD but some concern for elevated BNP suggestive of CHF  - On 5 L nasal cannula at home, at his baseline o2 today.   - Repeat CXR on 1/10 shows worsening opacities.  - Continue other treatments with diuretics, Tamiflu,steroids and nebs per below  - Continuous oximetry shows stable oxygen saturation  - COVID negative (Vaccinated)  - Back to his baseline oxygen 5 L continuously     Acute influenza A infection/pneumonia  - Chest x-ray reported as new focal airspace consolidation in the right lower lung consistent  with new right-sided pneumonia.Repeat CXR on 1/10 shows worsening opacities.procalcitonin neg X2, abx stopped now.   - Not vaccinated for flu, on droplet isolation  - Completed 5 days of  Tamiflu today  - No spuum or blood cx ordered on admission.   - Specimen for respiratory culture never got collected.      Acute blood loss anemia   - Probably secondary to hematoma.  - Baseline Hb 9, Hb today noted to be 5.6.  - Patient has extensive bruises on her thigh and abdominal wall  - S/P Transfuse 1 U, monitor Hb serially.  - Continue to hold Xarelto  - CT femur shows hematoma in her thigh with concern of rectus sheath hematoma   - CT abdomen shows rectus sheath hematoma  - Again hemoglobin dropped to 6.8 today 1/13/2022  - Transfuse another unit of blood on 1/13/2022   - Stable hemoglobin over the past 3 days  - Continue iron supplement  - Continue to monitor CBC at TCU     COPD exacerbation   - resolved  - On albuterol, DuoNeb and Breo ellipta   - At his baseline home o2 requirement, 5L  - Completed total 5 days of steroids.   - Leukocytosis likely 2/2 to steroids, last dose today. Monitor.     History of CHF  - Presacral edema, elevated BNP on admission  - Last echo was 60 %.    -Repeat ECHO EF 45-50%  - Resume home carvedilol,   - decrease torsemide dose because of low blood pressure      TIFFANIE on CKD  - Appears baseline 1.5-1.9  - Monitor input and output  - Avoid nephrotoxic medications  - Monitor closely, creatinine is stable- Restart torsemide as patient is receiving blood but at lower dose     Coronary artery disease  - Denies having chest pain or palpitation.  - negative serial trops  - Status post CABG in the past  - Echocardiogram reviewed  - PTA atorvastatin, coreg     Diabetes mellitus type 2  - No diabetic medication is seen in home medication  - High resistance insulin sliding scale as pt was on steroids, last dose today  - Last hemoglobin A1c 6.3  - No need for medication at this point as blood sugar is  stable off steroid     Parkinson's disease  - PTA Sinemet     History of  atrial fibrillation  - PTA Coreg.   - Hold Xarelto on discharge for at least 2 weeks  - Heart rate is stable     Right thigh pain - chronic per patient  - Pt reports R thigh pain which is chronic, worsened in last few days. No injury.  - Xray R femur is negative for fracture  - CT femur shows hematoma but no fracture     Leukocytosis  - Probably secondary to steroid versus reaction to hematoma  - No signs symptoms of infection  - Improving  - Continue to monitor CBC at TCU     Weakness and deconditioning  - Secondary to above  - PT/OT evaluation  -Plan for TCU discharge     Overall stabilized and discharged to TCU on 1/17/2022 for PT, OT, nursing cares, medical management and monitoring.       Today:  He is on 5 L oxygen which is baseline home setting. Weak and fatigued, dyspneic with exertion. Desats with therapy although a little quicker recovery today than previous. Reports nasal congestion, using ocean nasal spray and Flonase, has humidity on oxygen. No nose bleeds. No fever. Some residual cough, completed tamiflu for influenza A pneumonia. Has COPD and uses Trelegy inhaler, also currently on duonebs which he thinks are helpful. Continued right groin, hip and leg pain, investigated in the hospital, no DVT, but hematoma noted right thigh on CT imaging. No fracture. He has hx of right hip/femur fracture and had 2 surgeries. Has CAD and s/p CABG in the past. Had vein harvest from right leg and reports right leg is always a little more swollen than left leg but now more pronounced. On diuretics for CHF, dose reduced in the hospital due to TIFFANIE, will recheck labs and reassess. Daily weights. Takes coreg for HTN and CHF. Has hx of Afib and currently off anticoagulation with xarelto due to rectus sheath hematoma. Felt to be spontaneous in origin, Hgb down to 5.6 requiring transfusion of at least 2 units. He is on iron. Recheck hgb next visit.  Reports good appetite. No urinary sx, takes tamsulosin for BPH. No nausea, vomiting, diarrhea or constipation.       PAST MEDICAL HISTORY:  Past Medical History:   Diagnosis Date     Atrial fibrillation (H)      Congestive heart failure (H)      COPD (chronic obstructive pulmonary disease) (H)      Coronary artery disease      Parkinsons (H)        MEDICATIONS:  Current Outpatient Medications   Medication Sig Dispense Refill     acetaminophen (TYLENOL) 325 MG tablet Take 3 tablets (975 mg) by mouth 3 times daily       albuterol (PROVENTIL) (2.5 MG/3ML) 0.083% neb solution Take 2.5 mg by nebulization every 4 hours as needed       atorvastatin (LIPITOR) 80 MG tablet Take 80 mg by mouth At Bedtime       busPIRone (BUSPAR) 7.5 MG tablet Take 7.5 mg by mouth 2 times daily       carbidopa-levodopa (SINEMET)  mg per tablet Take 1 tablet by mouth 2 times daily        carvedilol (COREG) 6.25 MG tablet Take 6.25 mg by mouth 2 times daily (with meals)       diclofenac (VOLTAREN) 1 % topical gel Apply 2 g topically 2 times daily as needed for moderate pain       DULoxetine (CYMBALTA) 20 MG capsule Take 40 mg by mouth daily       ferrous sulfate (FEROSUL) 325 (65 Fe) MG tablet Take 325 mg by mouth daily (with breakfast)       fish oil-omega-3 fatty acids 1000 MG capsule Take 1 g by mouth daily       fluticasone (FLONASE) 50 MCG/ACT nasal spray Spray 2 sprays into both nostrils daily       Fluticasone-Umeclidin-Vilanterol (TRELEGY ELLIPTA) 100-62.5-25 MCG/INH oral inhaler Inhale 1 puff into the lungs daily       gabapentin (NEURONTIN) 400 MG capsule Take 400 mg by mouth 2 times daily       HYDROmorphone (DILAUDID) 2 MG tablet Take 0.5 tablets (1 mg) by mouth every 6 hours as needed for moderate to severe pain 10 tablet 0     ipratropium - albuterol 0.5 mg/2.5 mg/3 mL (DUONEB) 0.5-2.5 (3) MG/3ML neb solution Take 1 vial by nebulization 4 times daily       melatonin 3 MG tablet Take 9 mg by mouth At Bedtime        "nitroGLYcerin (NITROSTAT) 0.4 MG sublingual tablet Place 0.4 mg under the tongue every 5 minutes as needed for chest pain For chest pain place 1 tablet under the tongue every 5 minutes for 3 doses. If symptoms persist 5 minutes after 1st dose call 911.       pantoprazole (PROTONIX) 40 MG EC tablet Take 40 mg by mouth daily       polyethylene glycol (MIRALAX) 17 g packet Take 17 g by mouth daily       senna-docusate (SENOKOT-S/PERICOLACE) 8.6-50 MG tablet Take 2 tablets by mouth At Bedtime       sodium chloride (OCEAN) 0.65 % nasal spray Spray 2 sprays into both nostrils continuous prn for congestion       tamsulosin (FLOMAX) 0.4 MG capsule Take 0.4 mg by mouth At Bedtime       torsemide (DEMADEX) 20 MG tablet Take 2 tablets (40 mg) by mouth daily          PHYSICAL EXAM:  General: Patient is alert male, on oxygen, dyspneic with exertion.    Vitals: /55   Pulse 68   Temp 98.1  F (36.7  C)   Resp 18   Ht 1.702 m (5' 7\")   Wt 81.6 kg (179 lb 12.8 oz)   SpO2 92%   BMI 28.16 kg/m    HEENT: Head is NCAT. Eyes show no injection or icterus. Nares negative. Oropharynx well hydrated.  Neck:  No JVD.  Lungs: Non labored respirations.   Abdomen: Soft. Bruising left flank area.   : Deferred.  Extremities: Mod right greater than left LE edema.  Musculoskeletal: Degen changes.   Skin: Extensive ecchymoses right lateral thigh and down leg.   Psych: Mood appears good.      LABS/DIAGNOSTIC DATA:  Component      Latest Ref Rng & Units 1/6/2022 1/7/2022 1/8/2022 1/9/2022 1/11/2022   Sodium      136 - 145 mmol/L 143 142 143 142 144   Potassium      3.5 - 5.0 mmol/L 4.1 4.2 3.9 3.6 3.6   Chloride      98 - 107 mmol/L 106 104 105 104 104   Carbon Dioxide      22 - 31 mmol/L 27 28 29 31 30   Anion Gap      5 - 18 mmol/L 10 10 9 7 10   Glucose      70 - 125 mg/dL 139 (H) 123 172 (H) 141 (H) 130 (H)   Calcium      8.5 - 10.5 mg/dL 7.6 (L) 8.1 (L) 7.9 (L) 8.4 (L) 7.7 (L)   Urea Nitrogen      8 - 28 mg/dL 29 (H) 36 (H) 35 (H) 36 " (H) 39 (H)   Creatinine      0.70 - 1.30 mg/dL 1.92 (H) 2.20 (H) 1.95 (H) 1.74 (H) 2.09 (H)   GFR Estimate      >60 mL/min/1.73m2 35 (L) 30 (L) 35 (L) 40 (L) 32 (L)     Component      Latest Ref Rng & Units 1/12/2022 1/13/2022 1/14/2022 1/16/2022   Sodium      136 - 145 mmol/L 139 140 141 139   Potassium      3.5 - 5.0 mmol/L 4.1 4.3 4.2 4.5   Chloride      98 - 107 mmol/L 101 103 105 103   Carbon Dioxide      22 - 31 mmol/L 30 30 30 29   Anion Gap      5 - 18 mmol/L 8 7 6 7   Glucose      70 - 125 mg/dL 156 (H) 125 117 115   Calcium      8.5 - 10.5 mg/dL 7.8 (L) 7.9 (L) 7.8 (L) 8.0 (L)   Urea Nitrogen      8 - 28 mg/dL 39 (H) 35 (H) 35 (H) 38 (H)   Creatinine      0.70 - 1.30 mg/dL 2.05 (H) 1.67 (H) 1.52 (H) 1.71 (H)   GFR Estimate      >60 mL/min/1.73m2 33 (L) 42 (L) 47 (L) 41 (L)     Component      Latest Ref Rng & Units 1/6/2022 1/7/2022 1/8/2022 1/9/2022 1/11/2022               6:24 AM   WBC      4.0 - 11.0 10e3/uL 13.4 (H) 14.2 (H) 14.2 (H) 16.5 (H) 18.4 (H)   RBC Count      4.40 - 5.90 10e6/uL 3.47 (L) 3.33 (L) 3.50 (L) 3.53 (L) 2.04 (L)   Hemoglobin      13.3 - 17.7 g/dL 9.7 (L) 9.4 (L) 9.5 (L) 9.8 (L) 5.7 (LL)   Hematocrit      40.0 - 53.0 % 31.6 (L) 30.6 (L) 31.5 (L) 31.4 (L) 18.4 (L)   MCV      78 - 100 fL 91 92 90 89 90   MCH      26.5 - 33.0 pg 28.0 28.2 27.1 27.8 27.9   MCHC      31.5 - 36.5 g/dL 30.7 (L) 30.7 (L) 30.2 (L) 31.2 (L) 31.0 (L)   RDW      10.0 - 15.0 % 19.0 (H) 19.0 (H) 18.9 (H) 18.9 (H) 18.8 (H)   Platelet Count      150 - 450 10e3/uL 368 348 361 352 223     Component      Latest Ref Rng & Units 1/11/2022 1/11/2022 1/12/2022 1/13/2022           9:00 AM  5:06 PM     WBC      4.0 - 11.0 10e3/uL   19.1 (H) 18.7 (H)   RBC Count      4.40 - 5.90 10e6/uL   2.62 (L) 2.33 (L)   Hemoglobin      13.3 - 17.7 g/dL 5.6 (LL) 6.4 (LL) 7.7 (L) 6.8 (LL)   Hematocrit      40.0 - 53.0 %   27.2 (L) 21.8 (L)   MCV      78 - 100 fL   104 (H) 94   MCH      26.5 - 33.0 pg   29.4 29.2   MCHC      31.5 - 36.5 g/dL    28.3 (L) 31.2 (L)   RDW      10.0 - 15.0 %   16.8 (H) 17.6 (H)   Platelet Count      150 - 450 10e3/uL   227 277     Component      Latest Ref Rng & Units 1/14/2022 1/15/2022 1/16/2022               WBC      4.0 - 11.0 10e3/uL 16.1 (H) 13.3 (H) 13.5 (H)   RBC Count      4.40 - 5.90 10e6/uL 2.51 (L) 2.54 (L) 2.59 (L)   Hemoglobin      13.3 - 17.7 g/dL 7.4 (L) 7.5 (L) 7.8 (L)   Hematocrit      40.0 - 53.0 % 23.5 (L) 24.2 (L) 25.5 (L)   MCV      78 - 100 fL 94 95 99   MCH      26.5 - 33.0 pg 29.5 29.5 30.1   MCHC      31.5 - 36.5 g/dL 31.5 31.0 (L) 30.6 (L)   RDW      10.0 - 15.0 % 17.5 (H) 18.2 (H) 18.3 (H)   Platelet Count      150 - 450 10e3/uL 309 343 377         ASSESSMENT/PLAN:  1. Acute on chronic respiratory failure. He is on oxygen, chronic on 5 liters at home. More short of breath than baseline, contributors multiple acute medical conditions exacerbating baseline weakness and frailty. Continue duonebs. He is not on prednisone now though did receive in the hospital.   2. Influenza A pneumonia. Treated in the hospital, completed tamiflu.    3. COPD. O2 dependent at home. On Trelegy Ellipta and duonebs.   4. CHF. Has LE edema. Continue on torsemide, coreg. Daily weights, monitor edema, clinical status. Follow up with cardiology.  5. Rectus sheath hematoma. Spontaneous. Home xarelto on hold due to hematoma. Discussed risks with patient, both off anticoagulation and risk of restarting. Consider revaluation by CT.   6. ABLA. Sec to rectus sheath hematoma and hematoma right leg. S/p transfusion, hgb down to 5.6. Last hgb 8.8 on 1/24/2022, improved. Recheck hgb, trend. Continue on iron.   7. Afib. Typically on xarelto at home. Anticoagulation on hold due to spontaneous bleeding event, hematoma. Consider restart when assured no further bleeding issues.   8. HTN. On Coreg. Monitor Bps in TCU.   9. CKD. With TIFFANIE in the hospital, creatinine up to 2.2, then improved. Continue to trend kidney function and electrolytes.     10. CAD. Hx CABG. No chest pain.   11. Right leg pain. Groin, hip and leg. Investigated in the hospital. Hx of 2 surgeries due to fractures. Doppler neg. CT showed hematoma. No new fractures. Monitor closely. Pain medications as need.   12. Parkinson disease. Continue PTA Sinemet.   13. BPH. On tamsulosin.   14. Depression with mixed anxiety. Continue home Buspar, duloxetine.   15. Weakness and deconditioning. Multiple medical complexities, continue in therapies as able, limited by dyspnea, right leg pain, desats, requirement of supplemental oxygen.           Electronically signed by: Sia Willett MD           Sincerely,        Sia Willett MD

## 2022-01-31 NOTE — PROGRESS NOTES
[facility] GERIATRICS  [place] Medical Record Number:  [id number]  Place of Service where encounter took place: [facility] ([facility]) [[id number]]   CODE STATUS:   CPR/Full code     Chief Complaint/Reason for Visit:  Chief Complaint   Patient presents with     Hospital F/U     Admit note to TCU-Influenza A pneumonia, weakness, rectus sheath hematoma, anemia.        HPI:    Atif Romero is a 76 year old male with hx of CHF, COPD, CAD, afib on xarelto, CKD, Parkinsons disease, admitted to the hospital on 1/6/2022 with SOB, cough and hypoxia. His discharge summary from complex hospitalization is partially excerpted below.     [facility]  Hospitalist Discharge Summary    Date of Admission:  1/6/2022  Date of Discharge:  1/17/2022     Hospital Course  76 year old male with past medical history of CHF, COPD with CRF, coronary artery disease, atrial fibrillation brought in to the emergency room department by EMS for further work-up and evaluation of hypoxia and worsening shortness of breath and cough. Found to have signs concerning for lobar pneumonia and positive influenza A.  He was admitted, please refer to H&P for details     Acute on chronic hypoxic respiratory failure   - resolved  - Suspect multifactorial and mainly driven by respiratory infection, influenza, COPD but some concern for elevated BNP suggestive of CHF  - On 5 L nasal cannula at home, at his baseline o2 today.   - Repeat CXR on 1/10 shows worsening opacities.  - Continue other treatments with diuretics, Tamiflu,steroids and nebs per below  - Continuous oximetry shows stable oxygen saturation  - COVID negative (Vaccinated)  - Back to his baseline oxygen 5 L continuously     Acute influenza A infection/pneumonia  - Chest x-ray reported as new focal airspace consolidation in the right lower lung consistent with new right-sided pneumonia.Repeat CXR on 1/10 shows worsening opacities.procalcitonin neg X2, abx  stopped now.   - Not vaccinated for flu, on droplet isolation  - Completed 5 days of  Tamiflu today  - No spuum or blood cx ordered on admission.   - Specimen for respiratory culture never got collected.      Acute blood loss anemia   - Probably secondary to hematoma.  - Baseline Hb 9, Hb today noted to be 5.6.  - Patient has extensive bruises on her thigh and abdominal wall  - S/P Transfuse 1 U, monitor Hb serially.  - Continue to hold Xarelto  - CT femur shows hematoma in her thigh with concern of rectus sheath hematoma   - CT abdomen shows rectus sheath hematoma  - Again hemoglobin dropped to 6.8 today 1/13/2022  - Transfuse another unit of blood on 1/13/2022   - Stable hemoglobin over the past 3 days  - Continue iron supplement  - Continue to monitor CBC at TCU     COPD exacerbation   - resolved  - On albuterol, DuoNeb and Breo ellipta   - At his baseline home o2 requirement, 5L  - Completed total 5 days of steroids.   - Leukocytosis likely 2/2 to steroids, last dose today. Monitor.     History of CHF  - Presacral edema, elevated BNP on admission  - Last echo was 60 %.    -Repeat ECHO EF 45-50%  - Resume home carvedilol,   - decrease torsemide dose because of low blood pressure      TIFFANIE on CKD  - Appears baseline 1.5-1.9  - Monitor input and output  - Avoid nephrotoxic medications  - Monitor closely, creatinine is stable- Restart torsemide as patient is receiving blood but at lower dose     Coronary artery disease  - Denies having chest pain or palpitation.  - negative serial trops  - Status post CABG in the past  - Echocardiogram reviewed  - PTA atorvastatin, coreg     Diabetes mellitus type 2  - No diabetic medication is seen in home medication  - High resistance insulin sliding scale as pt was on steroids, last dose today  - Last hemoglobin A1c 6.3  - No need for medication at this point as blood sugar is stable off steroid     Parkinson's disease  - PTA Sinemet     History of  atrial fibrillation  - PTA  Coreg.   - Hold Xarelto on discharge for at least 2 weeks  - Heart rate is stable     Right thigh pain - chronic per patient  - Pt reports R thigh pain which is chronic, worsened in last few days. No injury.  - Xray R femur is negative for fracture  - CT femur shows hematoma but no fracture     Leukocytosis  - Probably secondary to steroid versus reaction to hematoma  - No signs symptoms of infection  - Improving  - Continue to monitor CBC at TCU     Weakness and deconditioning  - Secondary to above  - PT/OT evaluation  -Plan for TCU discharge     Overall stabilized and discharged to TCU on 1/17/2022 for PT, OT, nursing cares, medical management and monitoring.       Today:  He is currently on 5 L oxygen which is what he is on at home. Feels more short of breath than baseline and overall weak as expected given the complexity of his hospitalization. Completed tamiflu for influenza A pneumonia. Has right groin, hip and leg pain, investigated in the hospital, no DVT, but hematoma noted right thigh on CT imaging. No fracture. He has hx of right hip/femur fracture and had 2 surgeries. Has CAD and s/p CABG in the past. Had vein harvest from right leg and reports right leg is always a little more swollen than left leg but now more pronounced. He is short of breath. No cough or fever. Call received yesterday with sx and duonebs added. He thinks they are helping. Had steroids in the hospital, does not take at baseline. Uses Trelegy inhaler for COPD. On diuretics for CHF. Takes coreg for HTN and CHF. Has hx of Afib and currently off anticoagulation with xarelto due to rectus sheath hematoma. Felt to be spontaneous in origin, though Hgb down to 5.6 requiring transfusion of at least 2 units. He is on iron. Denies dizziness. States appetite is pretty good despite feeling overall weak and tired. No urinary sx, takes tamsulosin for BPH. No abdominal pain, nausea, vomiting, diarrhea or constipation. He denies new vision or hearing  concerns. Lives at home with his wife at baseline.       PAST MEDICAL HISTORY:  Past Medical History:   Diagnosis Date     Atrial fibrillation (H)      Congestive heart failure (H)      COPD (chronic obstructive pulmonary disease) (H)      Coronary artery disease      Parkinsons (H)        PAST SURGICAL HISTORY:  Past Surgical History:   Procedure Laterality Date     BACK SURGERY       BYPASS GRAFT ARTERY CORONARY      quadruple bypass     IR LUMBAR EPIDURAL STEROID INJECTION  6/1/2006     IR LUMBAR EPIDURAL STEROID INJECTION  7/26/2006     IR LUMBAR EPIDURAL STEROID INJECTION  10/11/2006     IR LUMBAR EPIDURAL STEROID INJECTION  8/15/2007     IR LUMBAR EPIDURAL STEROID INJECTION  12/7/2007     IR LUMBAR EPIDURAL STEROID INJECTION  4/15/2008     JOINT REPLACEMENT       SPLENECTOMY, TOTAL         FAMILY HISTORY:  Family History   Problem Relation Age of Onset     Early Death No family hx of        SOCIAL HISTORY:  Social History     Socioeconomic History     Marital status:      Spouse name: Not on file     Number of children: Not on file     Years of education: Not on file     Highest education level: Not on file   Occupational History     Not on file   Tobacco Use     Smoking status: Smoker, Current Status Unknown     Smokeless tobacco: Never Used   Substance and Sexual Activity     Alcohol use: Not on file     Drug use: Not on file     Sexual activity: Not on file   Other Topics Concern     Not on file   Social History Narrative     Not on file     Social Determinants of Health     Financial Resource Strain: Not on file   Food Insecurity: Not on file   Transportation Needs: Not on file   Physical Activity: Not on file   Stress: Not on file   Social Connections: Not on file   Intimate Partner Violence: Not on file   Housing Stability: Not on file       MEDICATIONS:  Current Outpatient Medications   Medication Sig Dispense Refill     acetaminophen (TYLENOL) 325 MG tablet Take 3 tablets (975 mg) by mouth 3  times daily       albuterol (PROVENTIL) (2.5 MG/3ML) 0.083% neb solution Take 2.5 mg by nebulization every 4 hours as needed       atorvastatin (LIPITOR) 80 MG tablet Take 80 mg by mouth At Bedtime       busPIRone (BUSPAR) 7.5 MG tablet Take 7.5 mg by mouth 2 times daily       carbidopa-levodopa (SINEMET)  mg per tablet Take 1 tablet by mouth 2 times daily        carvedilol (COREG) 6.25 MG tablet Take 6.25 mg by mouth 2 times daily (with meals)       diclofenac (VOLTAREN) 1 % topical gel Apply 2 g topically 2 times daily as needed for moderate pain       DULoxetine (CYMBALTA) 20 MG capsule Take 40 mg by mouth daily       ferrous sulfate (FEROSUL) 325 (65 Fe) MG tablet Take 325 mg by mouth daily (with breakfast)       fish oil-omega-3 fatty acids 1000 MG capsule Take 1 g by mouth daily       fluticasone (FLONASE) 50 MCG/ACT nasal spray Spray 2 sprays into both nostrils daily       Fluticasone-Umeclidin-Vilanterol (TRELEGY ELLIPTA) 100-62.5-25 MCG/INH oral inhaler Inhale 1 puff into the lungs daily       gabapentin (NEURONTIN) 400 MG capsule Take 400 mg by mouth 2 times daily       HYDROmorphone (DILAUDID) 2 MG tablet Take 0.5 tablets (1 mg) by mouth every 6 hours as needed for moderate to severe pain 10 tablet 0     ipratropium - albuterol 0.5 mg/2.5 mg/3 mL (DUONEB) 0.5-2.5 (3) MG/3ML neb solution Take 1 vial by nebulization 4 times daily       melatonin 3 MG tablet Take 9 mg by mouth At Bedtime       nitroGLYcerin (NITROSTAT) 0.4 MG sublingual tablet Place 0.4 mg under the tongue every 5 minutes as needed for chest pain For chest pain place 1 tablet under the tongue every 5 minutes for 3 doses. If symptoms persist 5 minutes after 1st dose call 911.       pantoprazole (PROTONIX) 40 MG EC tablet Take 40 mg by mouth daily       polyethylene glycol (MIRALAX) 17 g packet Take 17 g by mouth daily       senna-docusate (SENOKOT-S/PERICOLACE) 8.6-50 MG tablet Take 2 tablets by mouth At Bedtime       sodium chloride  "(OCEAN) 0.65 % nasal spray Spray 2 sprays into both nostrils continuous prn for congestion       tamsulosin (FLOMAX) 0.4 MG capsule Take 0.4 mg by mouth At Bedtime       torsemide (DEMADEX) 20 MG tablet Take 2 tablets (40 mg) by mouth daily          ALLERGIES:   No Known Allergies       REVIEW OF SYSTEMS:  Pertinent items as noted in HPI.      PHYSICAL EXAM:  General: Patient is alert male, on oxygen, no distress, dyspneic at times with conversation.   Vitals: /65   Pulse 65   Temp 98.7  F (37.1  C)   Resp 18   Ht 1.702 m (5' 7\")   Wt 80.9 kg (178 lb 6.4 oz)   SpO2 94%   BMI 27.94 kg/m    HEENT: Head is NCAT. Eyes show no injection or icterus. Nares negative. Oropharynx well hydrated.  Neck: Supple. No tenderness or adenopathy. No JVD.  Lungs: Clear bilaterally. No wheezes.  Cardiovascular: Regular rate and rhythm, normal S1. S2.  Back: No spinal or CVA tenderness.  Abdomen: Soft, no tenderness on exam. Bowel sounds present. No guarding rebound or rigidity. Bruising left flank area.   : Deferred.  Extremities: Mod right greater than left LE edema is noted.  Musculoskeletal: Degen changes.   Skin: Extensive ecchymoses right lateral thigh and down leg.   Psych: Mood appears good.      LABS/DIAGNOSTIC DATA:  Component      Latest Ref Rng & Units 1/6/2022 1/7/2022 1/8/2022 1/9/2022 1/11/2022   Sodium      136 - 145 mmol/L 143 142 143 142 144   Potassium      3.5 - 5.0 mmol/L 4.1 4.2 3.9 3.6 3.6   Chloride      98 - 107 mmol/L 106 104 105 104 104   Carbon Dioxide      22 - 31 mmol/L 27 28 29 31 30   Anion Gap      5 - 18 mmol/L 10 10 9 7 10   Glucose      70 - 125 mg/dL 139 (H) 123 172 (H) 141 (H) 130 (H)   Calcium      8.5 - 10.5 mg/dL 7.6 (L) 8.1 (L) 7.9 (L) 8.4 (L) 7.7 (L)   Urea Nitrogen      8 - 28 mg/dL 29 (H) 36 (H) 35 (H) 36 (H) 39 (H)   Creatinine      0.70 - 1.30 mg/dL 1.92 (H) 2.20 (H) 1.95 (H) 1.74 (H) 2.09 (H)   GFR Estimate      >60 mL/min/1.73m2 35 (L) 30 (L) 35 (L) 40 (L) 32 (L) "     Component      Latest Ref Rng & Units 1/12/2022 1/13/2022 1/14/2022 1/16/2022   Sodium      136 - 145 mmol/L 139 140 141 139   Potassium      3.5 - 5.0 mmol/L 4.1 4.3 4.2 4.5   Chloride      98 - 107 mmol/L 101 103 105 103   Carbon Dioxide      22 - 31 mmol/L 30 30 30 29   Anion Gap      5 - 18 mmol/L 8 7 6 7   Glucose      70 - 125 mg/dL 156 (H) 125 117 115   Calcium      8.5 - 10.5 mg/dL 7.8 (L) 7.9 (L) 7.8 (L) 8.0 (L)   Urea Nitrogen      8 - 28 mg/dL 39 (H) 35 (H) 35 (H) 38 (H)   Creatinine      0.70 - 1.30 mg/dL 2.05 (H) 1.67 (H) 1.52 (H) 1.71 (H)   GFR Estimate      >60 mL/min/1.73m2 33 (L) 42 (L) 47 (L) 41 (L)     Component      Latest Ref Rng & Units 1/6/2022 1/7/2022 1/8/2022 1/9/2022 1/11/2022               6:24 AM   WBC      4.0 - 11.0 10e3/uL 13.4 (H) 14.2 (H) 14.2 (H) 16.5 (H) 18.4 (H)   RBC Count      4.40 - 5.90 10e6/uL 3.47 (L) 3.33 (L) 3.50 (L) 3.53 (L) 2.04 (L)   Hemoglobin      13.3 - 17.7 g/dL 9.7 (L) 9.4 (L) 9.5 (L) 9.8 (L) 5.7 (LL)   Hematocrit      40.0 - 53.0 % 31.6 (L) 30.6 (L) 31.5 (L) 31.4 (L) 18.4 (L)   MCV      78 - 100 fL 91 92 90 89 90   MCH      26.5 - 33.0 pg 28.0 28.2 27.1 27.8 27.9   MCHC      31.5 - 36.5 g/dL 30.7 (L) 30.7 (L) 30.2 (L) 31.2 (L) 31.0 (L)   RDW      10.0 - 15.0 % 19.0 (H) 19.0 (H) 18.9 (H) 18.9 (H) 18.8 (H)   Platelet Count      150 - 450 10e3/uL 368 348 361 352 223     Component      Latest Ref Rng & Units 1/11/2022 1/11/2022 1/12/2022 1/13/2022           9:00 AM  5:06 PM     WBC      4.0 - 11.0 10e3/uL   19.1 (H) 18.7 (H)   RBC Count      4.40 - 5.90 10e6/uL   2.62 (L) 2.33 (L)   Hemoglobin      13.3 - 17.7 g/dL 5.6 (LL) 6.4 (LL) 7.7 (L) 6.8 (LL)   Hematocrit      40.0 - 53.0 %   27.2 (L) 21.8 (L)   MCV      78 - 100 fL   104 (H) 94   MCH      26.5 - 33.0 pg   29.4 29.2   MCHC      31.5 - 36.5 g/dL   28.3 (L) 31.2 (L)   RDW      10.0 - 15.0 %   16.8 (H) 17.6 (H)   Platelet Count      150 - 450 10e3/uL   227 277     Component      Latest Ref Rng & Units  1/14/2022 1/15/2022 1/16/2022               WBC      4.0 - 11.0 10e3/uL 16.1 (H) 13.3 (H) 13.5 (H)   RBC Count      4.40 - 5.90 10e6/uL 2.51 (L) 2.54 (L) 2.59 (L)   Hemoglobin      13.3 - 17.7 g/dL 7.4 (L) 7.5 (L) 7.8 (L)   Hematocrit      40.0 - 53.0 % 23.5 (L) 24.2 (L) 25.5 (L)   MCV      78 - 100 fL 94 95 99   MCH      26.5 - 33.0 pg 29.5 29.5 30.1   MCHC      31.5 - 36.5 g/dL 31.5 31.0 (L) 30.6 (L)   RDW      10.0 - 15.0 % 17.5 (H) 18.2 (H) 18.3 (H)   Platelet Count      150 - 450 10e3/uL 309 343 377         ASSESSMENT/PLAN:  1. Influenza A pneumonia. Treated in the hospital, completed tamiflu.    2. Acute on chronic respiratory failure. He is on oxygen, chronic on 5 liters at home. More short of breath than baseline, contributors multiple acute medical conditions exacerbating baseline weakness and frailty. Added nebs to see if any benefit. He is not on prednisone now though did receive in the hospital.   3. COPD. O2 dependent at home. On Trelegy Ellipta. Added nebulizer treatments.  4. CHF. Has LE edema. Continue on torsemide. Also on coreg. Daily weights, monitor edema, clinical status. Follow up with cardiology.  5. Rectus sheath hematoma. Spontaneous. Home xarelto on hold due to hematoma. Discussed risks with patient, both off anticoagulation and risk of restarting.   6. ABLA. Sec to rectus sheath hematoma and hematoma right leg. S/p transfusion, hgb got down to 5.6. Last hgb 8.8 yesterday 1/24/2022, improved. Labs reviewed, continue to trend. Continue iron.  7. Afib. Typically on xarelto at home. Anticoagulation on hold due to spontaneous bleeding event, hematoma. Consider restart when assured no further bleeding issues.   8. HTN. On Coreg. Monitor Bps in TCU.   9. CKD. With TIFFANIE in the hospital, creatinine up to 2.2, then improved. Continue to trend kidney function and electrolytes.    10. CAD. Hx CABG. Reports always has a little more swelling on right than left leg.  11. Right leg pain. Groin, hip and  leg. Investigated in the hospital. Hx of 2 surgeries due to fractures. Doppler neg. CT showed hematoma. No new fractures. Monitor closely. Pain medications as need.   12. DM. Not on meds.   13. Parkinson disease. Continue PTA Sinemet.   14. Leukocytosis. Improved.   15. Weakness and deconditioning. Participate in therapies in TCU as able.   16. BPH. On tamsulosin.   17. Depression with mixed anxiety. Continue home Buspar, duloxetine.   18. Code status is full code.           Electronically signed by: Sia Willett MD

## 2022-02-07 NOTE — PROGRESS NOTES
[facility] GERIATRICS  [facility] Record Number:  [id number]  Place of Service where encounter took place: [facility] ([facility]) [[id number]]   CODE STATUS:   CPR/Full code     Chief Complaint/Reason for Visit:  Chief Complaint   Patient presents with     RECHECK     TCU 1/27/2022. Influenza A pneumonia, weakness, rectus sheath hematoma, anemia, COPD, CHF.        TCU HPI:    Atif Romero is a 76 year old male with hx of CHF, COPD, CAD, afib on xarelto, CKD, Parkinsons disease, admitted to the hospital on 1/6/2022 with SOB, cough and hypoxia. His discharge summary from complex hospitalization is partially excerpted below.     [facility]  Hospitalist Discharge Summary    Date of Admission:  1/6/2022  Date of Discharge:  1/17/2022     Hospital Course  76 year old male with past medical history of CHF, COPD with CRF, coronary artery disease, atrial fibrillation brought in to the emergency room department by EMS for further work-up and evaluation of hypoxia and worsening shortness of breath and cough. Found to have signs concerning for lobar pneumonia and positive influenza A.  He was admitted, please refer to H&P for details     Acute on chronic hypoxic respiratory failure   - resolved  - Suspect multifactorial and mainly driven by respiratory infection, influenza, COPD but some concern for elevated BNP suggestive of CHF  - On 5 L nasal cannula at home, at his baseline o2 today.   - Repeat CXR on 1/10 shows worsening opacities.  - Continue other treatments with diuretics, Tamiflu,steroids and nebs per below  - Continuous oximetry shows stable oxygen saturation  - COVID negative (Vaccinated)  - Back to his baseline oxygen 5 L continuously     Acute influenza A infection/pneumonia  - Chest x-ray reported as new focal airspace consolidation in the right lower lung consistent with new right-sided pneumonia.Repeat CXR on 1/10 shows worsening opacities.procalcitonin neg  X2, abx stopped now.   - Not vaccinated for flu, on droplet isolation  - Completed 5 days of  Tamiflu today  - No spuum or blood cx ordered on admission.   - Specimen for respiratory culture never got collected.      Acute blood loss anemia   - Probably secondary to hematoma.  - Baseline Hb 9, Hb today noted to be 5.6.  - Patient has extensive bruises on her thigh and abdominal wall  - S/P Transfuse 1 U, monitor Hb serially.  - Continue to hold Xarelto  - CT femur shows hematoma in her thigh with concern of rectus sheath hematoma   - CT abdomen shows rectus sheath hematoma  - Again hemoglobin dropped to 6.8 today 1/13/2022  - Transfuse another unit of blood on 1/13/2022   - Stable hemoglobin over the past 3 days  - Continue iron supplement  - Continue to monitor CBC at TCU     COPD exacerbation   - resolved  - On albuterol, DuoNeb and Breo ellipta   - At his baseline home o2 requirement, 5L  - Completed total 5 days of steroids.   - Leukocytosis likely 2/2 to steroids, last dose today. Monitor.     History of CHF  - Presacral edema, elevated BNP on admission  - Last echo was 60 %.    -Repeat ECHO EF 45-50%  - Resume home carvedilol,   - decrease torsemide dose because of low blood pressure      TIFFANIE on CKD  - Appears baseline 1.5-1.9  - Monitor input and output  - Avoid nephrotoxic medications  - Monitor closely, creatinine is stable- Restart torsemide as patient is receiving blood but at lower dose     Coronary artery disease  - Denies having chest pain or palpitation.  - negative serial trops  - Status post CABG in the past  - Echocardiogram reviewed  - PTA atorvastatin, coreg     Diabetes mellitus type 2  - No diabetic medication is seen in home medication  - High resistance insulin sliding scale as pt was on steroids, last dose today  - Last hemoglobin A1c 6.3  - No need for medication at this point as blood sugar is stable off steroid     Parkinson's disease  - PTA Sinemet     History of  atrial fibrillation  -  PTA Coreg.   - Hold Xarelto on discharge for at least 2 weeks  - Heart rate is stable     Right thigh pain - chronic per patient  - Pt reports R thigh pain which is chronic, worsened in last few days. No injury.  - Xray R femur is negative for fracture  - CT femur shows hematoma but no fracture     Leukocytosis  - Probably secondary to steroid versus reaction to hematoma  - No signs symptoms of infection  - Improving  - Continue to monitor CBC at TCU     Weakness and deconditioning  - Secondary to above  - PT/OT evaluation  -Plan for TCU discharge     Overall stabilized and discharged to TCU on 1/17/2022 for PT, OT, nursing cares, medical management and monitoring.       Today:  He is on 5 L oxygen which is baseline home setting. Weak and fatigued, dyspneic with exertion. Desats with therapy although a little quicker recovery today than previous. Reports nasal congestion, using ocean nasal spray and Flonase, has humidity on oxygen. No nose bleeds. No fever. Some residual cough, completed tamiflu for influenza A pneumonia. Has COPD and uses Trelegy inhaler, also currently on duonebs which he thinks are helpful. Continued right groin, hip and leg pain, investigated in the hospital, no DVT, but hematoma noted right thigh on CT imaging. No fracture. He has hx of right hip/femur fracture and had 2 surgeries. Has CAD and s/p CABG in the past. Had vein harvest from right leg and reports right leg is always a little more swollen than left leg but now more pronounced. On diuretics for CHF, dose reduced in the hospital due to TIFFANIE, will recheck labs and reassess. Daily weights. Takes coreg for HTN and CHF. Has hx of Afib and currently off anticoagulation with xarelto due to rectus sheath hematoma. Felt to be spontaneous in origin, Hgb down to 5.6 requiring transfusion of at least 2 units. He is on iron. Recheck hgb next visit. Reports good appetite. No urinary sx, takes tamsulosin for BPH. No nausea, vomiting, diarrhea or  constipation.       PAST MEDICAL HISTORY:  Past Medical History:   Diagnosis Date     Atrial fibrillation (H)      Congestive heart failure (H)      COPD (chronic obstructive pulmonary disease) (H)      Coronary artery disease      Parkinsons (H)        MEDICATIONS:  Current Outpatient Medications   Medication Sig Dispense Refill     acetaminophen (TYLENOL) 325 MG tablet Take 3 tablets (975 mg) by mouth 3 times daily       albuterol (PROVENTIL) (2.5 MG/3ML) 0.083% neb solution Take 2.5 mg by nebulization every 4 hours as needed       atorvastatin (LIPITOR) 80 MG tablet Take 80 mg by mouth At Bedtime       busPIRone (BUSPAR) 7.5 MG tablet Take 7.5 mg by mouth 2 times daily       carbidopa-levodopa (SINEMET)  mg per tablet Take 1 tablet by mouth 2 times daily        carvedilol (COREG) 6.25 MG tablet Take 6.25 mg by mouth 2 times daily (with meals)       diclofenac (VOLTAREN) 1 % topical gel Apply 2 g topically 2 times daily as needed for moderate pain       DULoxetine (CYMBALTA) 20 MG capsule Take 40 mg by mouth daily       ferrous sulfate (FEROSUL) 325 (65 Fe) MG tablet Take 325 mg by mouth daily (with breakfast)       fish oil-omega-3 fatty acids 1000 MG capsule Take 1 g by mouth daily       fluticasone (FLONASE) 50 MCG/ACT nasal spray Spray 2 sprays into both nostrils daily       Fluticasone-Umeclidin-Vilanterol (TRELEGY ELLIPTA) 100-62.5-25 MCG/INH oral inhaler Inhale 1 puff into the lungs daily       gabapentin (NEURONTIN) 400 MG capsule Take 400 mg by mouth 2 times daily       HYDROmorphone (DILAUDID) 2 MG tablet Take 0.5 tablets (1 mg) by mouth every 6 hours as needed for moderate to severe pain 10 tablet 0     ipratropium - albuterol 0.5 mg/2.5 mg/3 mL (DUONEB) 0.5-2.5 (3) MG/3ML neb solution Take 1 vial by nebulization 4 times daily       melatonin 3 MG tablet Take 9 mg by mouth At Bedtime       nitroGLYcerin (NITROSTAT) 0.4 MG sublingual tablet Place 0.4 mg under the tongue every 5 minutes as needed  "for chest pain For chest pain place 1 tablet under the tongue every 5 minutes for 3 doses. If symptoms persist 5 minutes after 1st dose call 911.       pantoprazole (PROTONIX) 40 MG EC tablet Take 40 mg by mouth daily       polyethylene glycol (MIRALAX) 17 g packet Take 17 g by mouth daily       senna-docusate (SENOKOT-S/PERICOLACE) 8.6-50 MG tablet Take 2 tablets by mouth At Bedtime       sodium chloride (OCEAN) 0.65 % nasal spray Spray 2 sprays into both nostrils continuous prn for congestion       tamsulosin (FLOMAX) 0.4 MG capsule Take 0.4 mg by mouth At Bedtime       torsemide (DEMADEX) 20 MG tablet Take 2 tablets (40 mg) by mouth daily          PHYSICAL EXAM:  General: Patient is alert male, on oxygen, dyspneic with exertion.    Vitals: /55   Pulse 68   Temp 98.1  F (36.7  C)   Resp 18   Ht 1.702 m (5' 7\")   Wt 81.6 kg (179 lb 12.8 oz)   SpO2 92%   BMI 28.16 kg/m    HEENT: Head is NCAT. Eyes show no injection or icterus. Nares negative. Oropharynx well hydrated.  Neck:  No JVD.  Lungs: Non labored respirations.   Abdomen: Soft. Bruising left flank area.   : Deferred.  Extremities: Mod right greater than left LE edema.  Musculoskeletal: Degen changes.   Skin: Extensive ecchymoses right lateral thigh and down leg.   Psych: Mood appears good.      LABS/DIAGNOSTIC DATA:  Component      Latest Ref Rng & Units 1/6/2022 1/7/2022 1/8/2022 1/9/2022 1/11/2022   Sodium      136 - 145 mmol/L 143 142 143 142 144   Potassium      3.5 - 5.0 mmol/L 4.1 4.2 3.9 3.6 3.6   Chloride      98 - 107 mmol/L 106 104 105 104 104   Carbon Dioxide      22 - 31 mmol/L 27 28 29 31 30   Anion Gap      5 - 18 mmol/L 10 10 9 7 10   Glucose      70 - 125 mg/dL 139 (H) 123 172 (H) 141 (H) 130 (H)   Calcium      8.5 - 10.5 mg/dL 7.6 (L) 8.1 (L) 7.9 (L) 8.4 (L) 7.7 (L)   Urea Nitrogen      8 - 28 mg/dL 29 (H) 36 (H) 35 (H) 36 (H) 39 (H)   Creatinine      0.70 - 1.30 mg/dL 1.92 (H) 2.20 (H) 1.95 (H) 1.74 (H) 2.09 (H)   GFR " Estimate      >60 mL/min/1.73m2 35 (L) 30 (L) 35 (L) 40 (L) 32 (L)     Component      Latest Ref Rng & Units 1/12/2022 1/13/2022 1/14/2022 1/16/2022   Sodium      136 - 145 mmol/L 139 140 141 139   Potassium      3.5 - 5.0 mmol/L 4.1 4.3 4.2 4.5   Chloride      98 - 107 mmol/L 101 103 105 103   Carbon Dioxide      22 - 31 mmol/L 30 30 30 29   Anion Gap      5 - 18 mmol/L 8 7 6 7   Glucose      70 - 125 mg/dL 156 (H) 125 117 115   Calcium      8.5 - 10.5 mg/dL 7.8 (L) 7.9 (L) 7.8 (L) 8.0 (L)   Urea Nitrogen      8 - 28 mg/dL 39 (H) 35 (H) 35 (H) 38 (H)   Creatinine      0.70 - 1.30 mg/dL 2.05 (H) 1.67 (H) 1.52 (H) 1.71 (H)   GFR Estimate      >60 mL/min/1.73m2 33 (L) 42 (L) 47 (L) 41 (L)     Component      Latest Ref Rng & Units 1/6/2022 1/7/2022 1/8/2022 1/9/2022 1/11/2022               6:24 AM   WBC      4.0 - 11.0 10e3/uL 13.4 (H) 14.2 (H) 14.2 (H) 16.5 (H) 18.4 (H)   RBC Count      4.40 - 5.90 10e6/uL 3.47 (L) 3.33 (L) 3.50 (L) 3.53 (L) 2.04 (L)   Hemoglobin      13.3 - 17.7 g/dL 9.7 (L) 9.4 (L) 9.5 (L) 9.8 (L) 5.7 (LL)   Hematocrit      40.0 - 53.0 % 31.6 (L) 30.6 (L) 31.5 (L) 31.4 (L) 18.4 (L)   MCV      78 - 100 fL 91 92 90 89 90   MCH      26.5 - 33.0 pg 28.0 28.2 27.1 27.8 27.9   MCHC      31.5 - 36.5 g/dL 30.7 (L) 30.7 (L) 30.2 (L) 31.2 (L) 31.0 (L)   RDW      10.0 - 15.0 % 19.0 (H) 19.0 (H) 18.9 (H) 18.9 (H) 18.8 (H)   Platelet Count      150 - 450 10e3/uL 368 348 361 352 223     Component      Latest Ref Rng & Units 1/11/2022 1/11/2022 1/12/2022 1/13/2022           9:00 AM  5:06 PM     WBC      4.0 - 11.0 10e3/uL   19.1 (H) 18.7 (H)   RBC Count      4.40 - 5.90 10e6/uL   2.62 (L) 2.33 (L)   Hemoglobin      13.3 - 17.7 g/dL 5.6 (LL) 6.4 (LL) 7.7 (L) 6.8 (LL)   Hematocrit      40.0 - 53.0 %   27.2 (L) 21.8 (L)   MCV      78 - 100 fL   104 (H) 94   MCH      26.5 - 33.0 pg   29.4 29.2   MCHC      31.5 - 36.5 g/dL   28.3 (L) 31.2 (L)   RDW      10.0 - 15.0 %   16.8 (H) 17.6 (H)   Platelet Count      150 - 450  10e3/uL   227 277     Component      Latest Ref Rng & Units 1/14/2022 1/15/2022 1/16/2022               WBC      4.0 - 11.0 10e3/uL 16.1 (H) 13.3 (H) 13.5 (H)   RBC Count      4.40 - 5.90 10e6/uL 2.51 (L) 2.54 (L) 2.59 (L)   Hemoglobin      13.3 - 17.7 g/dL 7.4 (L) 7.5 (L) 7.8 (L)   Hematocrit      40.0 - 53.0 % 23.5 (L) 24.2 (L) 25.5 (L)   MCV      78 - 100 fL 94 95 99   MCH      26.5 - 33.0 pg 29.5 29.5 30.1   MCHC      31.5 - 36.5 g/dL 31.5 31.0 (L) 30.6 (L)   RDW      10.0 - 15.0 % 17.5 (H) 18.2 (H) 18.3 (H)   Platelet Count      150 - 450 10e3/uL 309 343 377         ASSESSMENT/PLAN:  1. Acute on chronic respiratory failure. He is on oxygen, chronic on 5 liters at home. More short of breath than baseline, contributors multiple acute medical conditions exacerbating baseline weakness and frailty. Continue duonebs. He is not on prednisone now though did receive in the hospital.   2. Influenza A pneumonia. Treated in the hospital, completed tamiflu.    3. COPD. O2 dependent at home. On Trelegy Ellipta and duonebs.   4. CHF. Has LE edema. Continue on torsemide, coreg. Daily weights, monitor edema, clinical status. Follow up with cardiology.  5. Rectus sheath hematoma. Spontaneous. Home xarelto on hold due to hematoma. Discussed risks with patient, both off anticoagulation and risk of restarting. Consider revaluation by CT.   6. ABLA. Sec to rectus sheath hematoma and hematoma right leg. S/p transfusion, hgb down to 5.6. Last hgb 8.8 on 1/24/2022, improved. Recheck hgb, trend. Continue on iron.   7. Afib. Typically on xarelto at home. Anticoagulation on hold due to spontaneous bleeding event, hematoma. Consider restart when assured no further bleeding issues.   8. HTN. On Coreg. Monitor Bps in TCU.   9. CKD. With TIFFANIE in the hospital, creatinine up to 2.2, then improved. Continue to trend kidney function and electrolytes.    10. CAD. Hx CABG. No chest pain.   11. Right leg pain. Groin, hip and leg. Investigated in the  hospital. Hx of 2 surgeries due to fractures. Doppler neg. CT showed hematoma. No new fractures. Monitor closely. Pain medications as need.   12. Parkinson disease. Continue PTA Sinemet.   13. BPH. On tamsulosin.   14. Depression with mixed anxiety. Continue home Buspar, duloxetine.   15. Weakness and deconditioning. Multiple medical complexities, continue in therapies as able, limited by dyspnea, right leg pain, desats, requirement of supplemental oxygen.           Electronically signed by: Sia Willett MD